# Patient Record
Sex: FEMALE | Race: WHITE | NOT HISPANIC OR LATINO | Employment: OTHER | URBAN - METROPOLITAN AREA
[De-identification: names, ages, dates, MRNs, and addresses within clinical notes are randomized per-mention and may not be internally consistent; named-entity substitution may affect disease eponyms.]

---

## 2017-12-06 ENCOUNTER — CONVERSION ENCOUNTER (OUTPATIENT)
Dept: MAMMOGRAPHY | Facility: HOSPITAL | Age: 62
End: 2017-12-06

## 2018-02-09 ENCOUNTER — OFFICE VISIT CONVERTED (OUTPATIENT)
Dept: FAMILY MEDICINE CLINIC | Facility: CLINIC | Age: 63
End: 2018-02-09
Attending: NURSE PRACTITIONER

## 2018-04-12 ENCOUNTER — OFFICE VISIT CONVERTED (OUTPATIENT)
Dept: FAMILY MEDICINE CLINIC | Facility: CLINIC | Age: 63
End: 2018-04-12
Attending: NURSE PRACTITIONER

## 2018-05-10 ENCOUNTER — OFFICE VISIT CONVERTED (OUTPATIENT)
Dept: FAMILY MEDICINE CLINIC | Facility: CLINIC | Age: 63
End: 2018-05-10
Attending: NURSE PRACTITIONER

## 2018-05-10 ENCOUNTER — CONVERSION ENCOUNTER (OUTPATIENT)
Dept: FAMILY MEDICINE CLINIC | Facility: CLINIC | Age: 63
End: 2018-05-10

## 2018-07-20 ENCOUNTER — CONVERSION ENCOUNTER (OUTPATIENT)
Dept: CARDIOLOGY | Facility: CLINIC | Age: 63
End: 2018-07-20

## 2018-07-20 ENCOUNTER — OFFICE VISIT CONVERTED (OUTPATIENT)
Dept: CARDIOLOGY | Facility: CLINIC | Age: 63
End: 2018-07-20
Attending: INTERNAL MEDICINE

## 2018-11-06 ENCOUNTER — OFFICE VISIT CONVERTED (OUTPATIENT)
Dept: FAMILY MEDICINE CLINIC | Facility: CLINIC | Age: 63
End: 2018-11-06
Attending: NURSE PRACTITIONER

## 2018-12-08 ENCOUNTER — CONVERSION ENCOUNTER (OUTPATIENT)
Dept: MAMMOGRAPHY | Facility: HOSPITAL | Age: 63
End: 2018-12-08

## 2019-01-18 ENCOUNTER — HOSPITAL ENCOUNTER (OUTPATIENT)
Dept: OTHER | Facility: HOSPITAL | Age: 64
Discharge: HOME | End: 2019-01-18
Attending: INTERNAL MEDICINE

## 2019-01-18 LAB
ANION GAP SERPL CALC-SCNC: 18 MMOL/L (ref 8–19)
BUN SERPL-MCNC: 11 MG/DL (ref 5–25)
BUN/CREAT SERPL: 19 {RATIO} (ref 6–20)
CALCIUM SERPL-MCNC: 9.3 MG/DL (ref 8.7–10.4)
CHLORIDE SERPL-SCNC: 100 MMOL/L (ref 99–111)
CONV CO2: 25 MMOL/L (ref 22–32)
CREAT UR-MCNC: 0.58 MG/DL (ref 0.5–0.9)
GFR SERPLBLD BASED ON 1.73 SQ M-ARVRAT: >60 ML/MIN/{1.73_M2}
GLUCOSE SERPL-MCNC: 111 MG/DL (ref 65–99)
MAGNESIUM SERPL-MCNC: 2.13 MG/DL (ref 1.6–2.3)
OSMOLALITY SERPL CALC.SUM OF ELEC: 286 MOSM/KG (ref 273–304)
POTASSIUM SERPL-SCNC: 4.5 MMOL/L (ref 3.5–5.3)
SODIUM SERPL-SCNC: 138 MMOL/L (ref 135–147)

## 2019-01-25 ENCOUNTER — OFFICE VISIT CONVERTED (OUTPATIENT)
Dept: CARDIOLOGY | Facility: CLINIC | Age: 64
End: 2019-01-25
Attending: INTERNAL MEDICINE

## 2019-02-18 ENCOUNTER — OFFICE VISIT CONVERTED (OUTPATIENT)
Dept: FAMILY MEDICINE CLINIC | Facility: CLINIC | Age: 64
End: 2019-02-18
Attending: NURSE PRACTITIONER

## 2019-03-02 ENCOUNTER — HOSPITAL ENCOUNTER (OUTPATIENT)
Dept: URGENT CARE | Facility: CLINIC | Age: 64
Discharge: HOME | End: 2019-03-02
Attending: NURSE PRACTITIONER

## 2019-03-08 ENCOUNTER — OFFICE VISIT CONVERTED (OUTPATIENT)
Dept: FAMILY MEDICINE CLINIC | Facility: CLINIC | Age: 64
End: 2019-03-08
Attending: NURSE PRACTITIONER

## 2019-04-26 ENCOUNTER — HOSPITAL ENCOUNTER (OUTPATIENT)
Dept: FAMILY MEDICINE CLINIC | Facility: CLINIC | Age: 64
Discharge: HOME | End: 2019-04-26
Attending: NURSE PRACTITIONER

## 2019-04-26 LAB
ALBUMIN SERPL-MCNC: 4.4 G/DL (ref 3.5–5)
ALBUMIN/GLOB SERPL: 1.4 {RATIO} (ref 1.4–2.6)
ALP SERPL-CCNC: 103 U/L (ref 43–160)
ALT SERPL-CCNC: 22 U/L (ref 10–40)
ANION GAP SERPL CALC-SCNC: 17 MMOL/L (ref 8–19)
AST SERPL-CCNC: 19 U/L (ref 15–50)
BASOPHILS # BLD AUTO: 0.01 10*3/UL (ref 0–0.2)
BASOPHILS NFR BLD AUTO: 0.2 % (ref 0–3)
BILIRUB SERPL-MCNC: 0.49 MG/DL (ref 0.2–1.3)
BUN SERPL-MCNC: 12 MG/DL (ref 5–25)
BUN/CREAT SERPL: 17 {RATIO} (ref 6–20)
CALCIUM SERPL-MCNC: 9.3 MG/DL (ref 8.7–10.4)
CHLORIDE SERPL-SCNC: 101 MMOL/L (ref 99–111)
CHOLEST SERPL-MCNC: 185 MG/DL (ref 107–200)
CHOLEST/HDLC SERPL: 3.9 {RATIO} (ref 3–6)
CONV ABS IMM GRAN: 0.01 10*3/UL (ref 0–0.2)
CONV CO2: 25 MMOL/L (ref 22–32)
CONV IMMATURE GRAN: 0.2 % (ref 0–1.8)
CONV TOTAL PROTEIN: 7.5 G/DL (ref 6.3–8.2)
CREAT UR-MCNC: 0.69 MG/DL (ref 0.5–0.9)
DEPRECATED RDW RBC AUTO: 43.8 FL (ref 36.4–46.3)
EOSINOPHIL # BLD AUTO: 0.11 10*3/UL (ref 0–0.7)
EOSINOPHIL # BLD AUTO: 1.9 % (ref 0–7)
ERYTHROCYTE [DISTWIDTH] IN BLOOD BY AUTOMATED COUNT: 12.7 % (ref 11.7–14.4)
EST. AVERAGE GLUCOSE BLD GHB EST-MCNC: 137 MG/DL
GFR SERPLBLD BASED ON 1.73 SQ M-ARVRAT: >60 ML/MIN/{1.73_M2}
GLOBULIN UR ELPH-MCNC: 3.1 G/DL (ref 2–3.5)
GLUCOSE SERPL-MCNC: 127 MG/DL (ref 65–99)
HBA1C MFR BLD: 14.6 G/DL (ref 12–16)
HBA1C MFR BLD: 6.4 % (ref 3.5–5.7)
HCT VFR BLD AUTO: 45.7 % (ref 37–47)
HDLC SERPL-MCNC: 48 MG/DL (ref 40–60)
LDLC SERPL CALC-MCNC: 113 MG/DL (ref 70–100)
LYMPHOCYTES # BLD AUTO: 1.58 10*3/UL (ref 1–5)
MCH RBC QN AUTO: 29.7 PG (ref 27–31)
MCHC RBC AUTO-ENTMCNC: 31.9 G/DL (ref 33–37)
MCV RBC AUTO: 93.1 FL (ref 81–99)
MONOCYTES # BLD AUTO: 0.49 10*3/UL (ref 0.2–1.2)
MONOCYTES NFR BLD AUTO: 8.4 % (ref 3–10)
NEUTROPHILS # BLD AUTO: 3.62 10*3/UL (ref 2–8)
NEUTROPHILS NFR BLD AUTO: 62.2 % (ref 30–85)
NRBC CBCN: 0 % (ref 0–0.7)
OSMOLALITY SERPL CALC.SUM OF ELEC: 287 MOSM/KG (ref 273–304)
PLATELET # BLD AUTO: 211 10*3/UL (ref 130–400)
PMV BLD AUTO: 11.5 FL (ref 9.4–12.3)
POTASSIUM SERPL-SCNC: 4.9 MMOL/L (ref 3.5–5.3)
RBC # BLD AUTO: 4.91 10*6/UL (ref 4.2–5.4)
SODIUM SERPL-SCNC: 138 MMOL/L (ref 135–147)
T4 FREE SERPL-MCNC: 1.3 NG/DL (ref 0.9–1.8)
TRIGL SERPL-MCNC: 121 MG/DL (ref 40–150)
TSH SERPL-ACNC: 0.79 M[IU]/L (ref 0.27–4.2)
VARIANT LYMPHS NFR BLD MANUAL: 27.1 % (ref 20–45)
VLDLC SERPL-MCNC: 24 MG/DL (ref 5–37)
WBC # BLD AUTO: 5.82 10*3/UL (ref 4.8–10.8)

## 2019-04-29 ENCOUNTER — OFFICE VISIT CONVERTED (OUTPATIENT)
Dept: FAMILY MEDICINE CLINIC | Facility: CLINIC | Age: 64
End: 2019-04-29
Attending: NURSE PRACTITIONER

## 2019-05-23 ENCOUNTER — HOSPITAL ENCOUNTER (OUTPATIENT)
Dept: GENERAL RADIOLOGY | Facility: HOSPITAL | Age: 64
Discharge: HOME | End: 2019-05-23
Attending: NURSE PRACTITIONER

## 2019-07-25 ENCOUNTER — CONVERSION ENCOUNTER (OUTPATIENT)
Dept: CARDIOLOGY | Facility: CLINIC | Age: 64
End: 2019-07-25

## 2019-07-25 ENCOUNTER — OFFICE VISIT CONVERTED (OUTPATIENT)
Dept: CARDIOLOGY | Facility: CLINIC | Age: 64
End: 2019-07-25
Attending: INTERNAL MEDICINE

## 2019-08-15 ENCOUNTER — OFFICE VISIT CONVERTED (OUTPATIENT)
Dept: FAMILY MEDICINE CLINIC | Facility: CLINIC | Age: 64
End: 2019-08-15
Attending: NURSE PRACTITIONER

## 2019-08-15 ENCOUNTER — HOSPITAL ENCOUNTER (OUTPATIENT)
Dept: FAMILY MEDICINE CLINIC | Facility: CLINIC | Age: 64
Discharge: HOME | End: 2019-08-15
Attending: NURSE PRACTITIONER

## 2019-08-15 ENCOUNTER — CONVERSION ENCOUNTER (OUTPATIENT)
Dept: FAMILY MEDICINE CLINIC | Facility: CLINIC | Age: 64
End: 2019-08-15

## 2019-08-17 LAB — BACTERIA SPEC AEROBE CULT: NORMAL

## 2019-09-16 ENCOUNTER — CONVERSION ENCOUNTER (OUTPATIENT)
Dept: FAMILY MEDICINE CLINIC | Facility: CLINIC | Age: 64
End: 2019-09-16

## 2019-09-16 ENCOUNTER — OFFICE VISIT CONVERTED (OUTPATIENT)
Dept: FAMILY MEDICINE CLINIC | Facility: CLINIC | Age: 64
End: 2019-09-16
Attending: NURSE PRACTITIONER

## 2019-10-10 ENCOUNTER — HOSPITAL ENCOUNTER (OUTPATIENT)
Dept: FAMILY MEDICINE CLINIC | Facility: CLINIC | Age: 64
Discharge: HOME | End: 2019-10-10
Attending: NURSE PRACTITIONER

## 2019-10-10 LAB
ALBUMIN SERPL-MCNC: 4.3 G/DL (ref 3.5–5)
ALBUMIN/GLOB SERPL: 1.5 {RATIO} (ref 1.4–2.6)
ALP SERPL-CCNC: 99 U/L (ref 43–160)
ALT SERPL-CCNC: 32 U/L (ref 10–40)
ANION GAP SERPL CALC-SCNC: 17 MMOL/L (ref 8–19)
AST SERPL-CCNC: 31 U/L (ref 15–50)
BASOPHILS # BLD AUTO: 0.01 10*3/UL (ref 0–0.2)
BASOPHILS NFR BLD AUTO: 0.1 % (ref 0–3)
BILIRUB SERPL-MCNC: 0.66 MG/DL (ref 0.2–1.3)
BUN SERPL-MCNC: 8 MG/DL (ref 5–25)
BUN/CREAT SERPL: 13 {RATIO} (ref 6–20)
CALCIUM SERPL-MCNC: 9.5 MG/DL (ref 8.7–10.4)
CHLORIDE SERPL-SCNC: 99 MMOL/L (ref 99–111)
CHOLEST SERPL-MCNC: 181 MG/DL (ref 107–200)
CHOLEST/HDLC SERPL: 4.6 {RATIO} (ref 3–6)
CONV ABS IMM GRAN: 0.02 10*3/UL (ref 0–0.2)
CONV CO2: 22 MMOL/L (ref 22–32)
CONV IMMATURE GRAN: 0.3 % (ref 0–1.8)
CONV TOTAL PROTEIN: 7.1 G/DL (ref 6.3–8.2)
CREAT UR-MCNC: 0.61 MG/DL (ref 0.5–0.9)
DEPRECATED RDW RBC AUTO: 44.7 FL (ref 36.4–46.3)
EOSINOPHIL # BLD AUTO: 0.12 10*3/UL (ref 0–0.7)
EOSINOPHIL # BLD AUTO: 1.8 % (ref 0–7)
ERYTHROCYTE [DISTWIDTH] IN BLOOD BY AUTOMATED COUNT: 13.2 % (ref 11.7–14.4)
EST. AVERAGE GLUCOSE BLD GHB EST-MCNC: 157 MG/DL
GFR SERPLBLD BASED ON 1.73 SQ M-ARVRAT: >60 ML/MIN/{1.73_M2}
GLOBULIN UR ELPH-MCNC: 2.8 G/DL (ref 2–3.5)
GLUCOSE SERPL-MCNC: 132 MG/DL (ref 65–99)
HBA1C MFR BLD: 7.1 % (ref 3.5–5.7)
HCT VFR BLD AUTO: 45.1 % (ref 37–47)
HDLC SERPL-MCNC: 39 MG/DL (ref 40–60)
HGB BLD-MCNC: 14.6 G/DL (ref 12–16)
LDLC SERPL CALC-MCNC: 104 MG/DL (ref 70–100)
LYMPHOCYTES # BLD AUTO: 1.42 10*3/UL (ref 1–5)
LYMPHOCYTES NFR BLD AUTO: 21.3 % (ref 20–45)
MCH RBC QN AUTO: 29.8 PG (ref 27–31)
MCHC RBC AUTO-ENTMCNC: 32.4 G/DL (ref 33–37)
MCV RBC AUTO: 92 FL (ref 81–99)
MONOCYTES # BLD AUTO: 0.47 10*3/UL (ref 0.2–1.2)
MONOCYTES NFR BLD AUTO: 7 % (ref 3–10)
NEUTROPHILS # BLD AUTO: 4.64 10*3/UL (ref 2–8)
NEUTROPHILS NFR BLD AUTO: 69.5 % (ref 30–85)
NRBC CBCN: 0 % (ref 0–0.7)
OSMOLALITY SERPL CALC.SUM OF ELEC: 278 MOSM/KG (ref 273–304)
PLATELET # BLD AUTO: 204 10*3/UL (ref 130–400)
PMV BLD AUTO: 11.9 FL (ref 9.4–12.3)
POTASSIUM SERPL-SCNC: 4.3 MMOL/L (ref 3.5–5.3)
RBC # BLD AUTO: 4.9 10*6/UL (ref 4.2–5.4)
SODIUM SERPL-SCNC: 134 MMOL/L (ref 135–147)
T4 FREE SERPL-MCNC: 1.1 NG/DL (ref 0.9–1.8)
TRIGL SERPL-MCNC: 189 MG/DL (ref 40–150)
TSH SERPL-ACNC: 1.04 M[IU]/L (ref 0.27–4.2)
VLDLC SERPL-MCNC: 38 MG/DL (ref 5–37)
WBC # BLD AUTO: 6.68 10*3/UL (ref 4.8–10.8)

## 2019-10-16 ENCOUNTER — CONVERSION ENCOUNTER (OUTPATIENT)
Dept: FAMILY MEDICINE CLINIC | Facility: CLINIC | Age: 64
End: 2019-10-16

## 2019-10-16 ENCOUNTER — OFFICE VISIT CONVERTED (OUTPATIENT)
Dept: FAMILY MEDICINE CLINIC | Facility: CLINIC | Age: 64
End: 2019-10-16
Attending: NURSE PRACTITIONER

## 2019-12-11 ENCOUNTER — HOSPITAL ENCOUNTER (OUTPATIENT)
Dept: MAMMOGRAPHY | Facility: HOSPITAL | Age: 64
Discharge: HOME | End: 2019-12-11
Attending: NURSE PRACTITIONER

## 2020-01-03 ENCOUNTER — HOSPITAL ENCOUNTER (OUTPATIENT)
Dept: MAMMOGRAPHY | Facility: HOSPITAL | Age: 65
Discharge: HOME | End: 2020-01-03
Attending: NURSE PRACTITIONER

## 2020-04-09 ENCOUNTER — HOSPITAL ENCOUNTER (OUTPATIENT)
Dept: FAMILY MEDICINE CLINIC | Facility: CLINIC | Age: 65
Discharge: HOME | End: 2020-04-09
Attending: NURSE PRACTITIONER

## 2020-04-09 LAB
25(OH)D3 SERPL-MCNC: 18 NG/ML (ref 30–100)
ALBUMIN SERPL-MCNC: 4.4 G/DL (ref 3.5–5)
ALBUMIN/GLOB SERPL: 1.6 {RATIO} (ref 1.4–2.6)
ALP SERPL-CCNC: 92 U/L (ref 43–160)
ALT SERPL-CCNC: 19 U/L (ref 10–40)
ANION GAP SERPL CALC-SCNC: 19 MMOL/L (ref 8–19)
AST SERPL-CCNC: 18 U/L (ref 15–50)
BASOPHILS # BLD AUTO: 0.01 10*3/UL (ref 0–0.2)
BASOPHILS NFR BLD AUTO: 0.2 % (ref 0–3)
BILIRUB SERPL-MCNC: 0.47 MG/DL (ref 0.2–1.3)
BUN SERPL-MCNC: 12 MG/DL (ref 5–25)
BUN/CREAT SERPL: 19 {RATIO} (ref 6–20)
CALCIUM SERPL-MCNC: 9.5 MG/DL (ref 8.7–10.4)
CHLORIDE SERPL-SCNC: 103 MMOL/L (ref 99–111)
CHOLEST SERPL-MCNC: 178 MG/DL (ref 107–200)
CHOLEST/HDLC SERPL: 4.3 {RATIO} (ref 3–6)
CONV ABS IMM GRAN: 0.01 10*3/UL (ref 0–0.2)
CONV CO2: 22 MMOL/L (ref 22–32)
CONV IMMATURE GRAN: 0.2 % (ref 0–1.8)
CONV TOTAL PROTEIN: 7.2 G/DL (ref 6.3–8.2)
CREAT UR-MCNC: 0.64 MG/DL (ref 0.5–0.9)
DEPRECATED RDW RBC AUTO: 43.1 FL (ref 36.4–46.3)
EOSINOPHIL # BLD AUTO: 0.12 10*3/UL (ref 0–0.7)
EOSINOPHIL # BLD AUTO: 1.8 % (ref 0–7)
ERYTHROCYTE [DISTWIDTH] IN BLOOD BY AUTOMATED COUNT: 12.9 % (ref 11.7–14.4)
EST. AVERAGE GLUCOSE BLD GHB EST-MCNC: 140 MG/DL
GFR SERPLBLD BASED ON 1.73 SQ M-ARVRAT: >60 ML/MIN/{1.73_M2}
GLOBULIN UR ELPH-MCNC: 2.8 G/DL (ref 2–3.5)
GLUCOSE SERPL-MCNC: 131 MG/DL (ref 65–99)
HBA1C MFR BLD: 6.5 % (ref 3.5–5.7)
HCT VFR BLD AUTO: 45.9 % (ref 37–47)
HDLC SERPL-MCNC: 41 MG/DL (ref 40–60)
HGB BLD-MCNC: 15.2 G/DL (ref 12–16)
LDLC SERPL CALC-MCNC: 100 MG/DL (ref 70–100)
LYMPHOCYTES # BLD AUTO: 1.68 10*3/UL (ref 1–5)
LYMPHOCYTES NFR BLD AUTO: 25.3 % (ref 20–45)
MCH RBC QN AUTO: 30.5 PG (ref 27–31)
MCHC RBC AUTO-ENTMCNC: 33.1 G/DL (ref 33–37)
MCV RBC AUTO: 92 FL (ref 81–99)
MONOCYTES # BLD AUTO: 0.51 10*3/UL (ref 0.2–1.2)
MONOCYTES NFR BLD AUTO: 7.7 % (ref 3–10)
NEUTROPHILS # BLD AUTO: 4.31 10*3/UL (ref 2–8)
NEUTROPHILS NFR BLD AUTO: 64.8 % (ref 30–85)
NRBC CBCN: 0 % (ref 0–0.7)
OSMOLALITY SERPL CALC.SUM OF ELEC: 290 MOSM/KG (ref 273–304)
PLATELET # BLD AUTO: 210 10*3/UL (ref 130–400)
PMV BLD AUTO: 11.7 FL (ref 9.4–12.3)
POTASSIUM SERPL-SCNC: 4.7 MMOL/L (ref 3.5–5.3)
RBC # BLD AUTO: 4.99 10*6/UL (ref 4.2–5.4)
SODIUM SERPL-SCNC: 139 MMOL/L (ref 135–147)
T4 FREE SERPL-MCNC: 1.1 NG/DL (ref 0.9–1.8)
TRIGL SERPL-MCNC: 187 MG/DL (ref 40–150)
TSH SERPL-ACNC: 2.43 M[IU]/L (ref 0.27–4.2)
VLDLC SERPL-MCNC: 37 MG/DL (ref 5–37)
WBC # BLD AUTO: 6.64 10*3/UL (ref 4.8–10.8)

## 2020-04-13 ENCOUNTER — TELEPHONE CONVERTED (OUTPATIENT)
Dept: FAMILY MEDICINE CLINIC | Facility: CLINIC | Age: 65
End: 2020-04-13
Attending: NURSE PRACTITIONER

## 2020-07-30 ENCOUNTER — OFFICE VISIT CONVERTED (OUTPATIENT)
Dept: CARDIOLOGY | Facility: CLINIC | Age: 65
End: 2020-07-30
Attending: INTERNAL MEDICINE

## 2020-10-06 ENCOUNTER — HOSPITAL ENCOUNTER (OUTPATIENT)
Dept: FAMILY MEDICINE CLINIC | Facility: CLINIC | Age: 65
Discharge: HOME | End: 2020-10-06
Attending: NURSE PRACTITIONER

## 2020-10-06 LAB
25(OH)D3 SERPL-MCNC: 20.7 NG/ML (ref 30–100)
ALBUMIN SERPL-MCNC: 4.4 G/DL (ref 3.5–5)
ALBUMIN/GLOB SERPL: 1.6 {RATIO} (ref 1.4–2.6)
ALP SERPL-CCNC: 90 U/L (ref 43–160)
ALT SERPL-CCNC: 27 U/L (ref 10–40)
ANION GAP SERPL CALC-SCNC: 16 MMOL/L (ref 8–19)
AST SERPL-CCNC: 23 U/L (ref 15–50)
BASOPHILS # BLD AUTO: 0.01 10*3/UL (ref 0–0.2)
BASOPHILS NFR BLD AUTO: 0.1 % (ref 0–3)
BILIRUB SERPL-MCNC: 0.37 MG/DL (ref 0.2–1.3)
BUN SERPL-MCNC: 12 MG/DL (ref 5–25)
BUN/CREAT SERPL: 16 {RATIO} (ref 6–20)
CALCIUM SERPL-MCNC: 9.2 MG/DL (ref 8.7–10.4)
CHLORIDE SERPL-SCNC: 104 MMOL/L (ref 99–111)
CHOLEST SERPL-MCNC: 167 MG/DL (ref 107–200)
CHOLEST/HDLC SERPL: 3.6 {RATIO} (ref 3–6)
CONV ABS IMM GRAN: 0.01 10*3/UL (ref 0–0.2)
CONV CO2: 26 MMOL/L (ref 22–32)
CONV IMMATURE GRAN: 0.1 % (ref 0–1.8)
CONV TOTAL PROTEIN: 7.2 G/DL (ref 6.3–8.2)
CREAT UR-MCNC: 0.76 MG/DL (ref 0.5–0.9)
DEPRECATED RDW RBC AUTO: 44.8 FL (ref 36.4–46.3)
EOSINOPHIL # BLD AUTO: 0.1 10*3/UL (ref 0–0.7)
EOSINOPHIL # BLD AUTO: 1.5 % (ref 0–7)
ERYTHROCYTE [DISTWIDTH] IN BLOOD BY AUTOMATED COUNT: 13 % (ref 11.7–14.4)
EST. AVERAGE GLUCOSE BLD GHB EST-MCNC: 169 MG/DL
GFR SERPLBLD BASED ON 1.73 SQ M-ARVRAT: >60 ML/MIN/{1.73_M2}
GLOBULIN UR ELPH-MCNC: 2.8 G/DL (ref 2–3.5)
GLUCOSE SERPL-MCNC: 133 MG/DL (ref 65–99)
HBA1C MFR BLD: 7.5 % (ref 3.5–5.7)
HCT VFR BLD AUTO: 48 % (ref 37–47)
HDLC SERPL-MCNC: 47 MG/DL (ref 40–60)
HGB BLD-MCNC: 15.1 G/DL (ref 12–16)
LDLC SERPL CALC-MCNC: 93 MG/DL (ref 70–100)
LYMPHOCYTES # BLD AUTO: 1.69 10*3/UL (ref 1–5)
LYMPHOCYTES NFR BLD AUTO: 25.3 % (ref 20–45)
MCH RBC QN AUTO: 29.5 PG (ref 27–31)
MCHC RBC AUTO-ENTMCNC: 31.5 G/DL (ref 33–37)
MCV RBC AUTO: 93.8 FL (ref 81–99)
MONOCYTES # BLD AUTO: 0.52 10*3/UL (ref 0.2–1.2)
MONOCYTES NFR BLD AUTO: 7.8 % (ref 3–10)
NEUTROPHILS # BLD AUTO: 4.34 10*3/UL (ref 2–8)
NEUTROPHILS NFR BLD AUTO: 65.2 % (ref 30–85)
NRBC CBCN: 0 % (ref 0–0.7)
OSMOLALITY SERPL CALC.SUM OF ELEC: 294 MOSM/KG (ref 273–304)
PLATELET # BLD AUTO: 206 10*3/UL (ref 130–400)
PMV BLD AUTO: 12.1 FL (ref 9.4–12.3)
POTASSIUM SERPL-SCNC: 4.6 MMOL/L (ref 3.5–5.3)
RBC # BLD AUTO: 5.12 10*6/UL (ref 4.2–5.4)
SODIUM SERPL-SCNC: 141 MMOL/L (ref 135–147)
T4 FREE SERPL-MCNC: 1.2 NG/DL (ref 0.9–1.8)
TRIGL SERPL-MCNC: 137 MG/DL (ref 40–150)
TSH SERPL-ACNC: 2.3 M[IU]/L (ref 0.27–4.2)
VLDLC SERPL-MCNC: 27 MG/DL (ref 5–37)
WBC # BLD AUTO: 6.67 10*3/UL (ref 4.8–10.8)

## 2020-10-08 ENCOUNTER — OFFICE VISIT CONVERTED (OUTPATIENT)
Dept: FAMILY MEDICINE CLINIC | Facility: CLINIC | Age: 65
End: 2020-10-08
Attending: NURSE PRACTITIONER

## 2020-12-17 ENCOUNTER — TELEPHONE CONVERTED (OUTPATIENT)
Dept: FAMILY MEDICINE CLINIC | Facility: CLINIC | Age: 65
End: 2020-12-17
Attending: NURSE PRACTITIONER

## 2020-12-17 ENCOUNTER — HOSPITAL ENCOUNTER (OUTPATIENT)
Dept: FAMILY MEDICINE CLINIC | Facility: CLINIC | Age: 65
Discharge: HOME | End: 2020-12-17
Attending: NURSE PRACTITIONER

## 2020-12-19 LAB — SARS-COV-2 RNA SPEC QL NAA+PROBE: DETECTED

## 2021-01-20 ENCOUNTER — HOSPITAL ENCOUNTER (OUTPATIENT)
Dept: MAMMOGRAPHY | Facility: HOSPITAL | Age: 66
Discharge: HOME | End: 2021-01-20
Attending: NURSE PRACTITIONER

## 2021-03-17 ENCOUNTER — OFFICE VISIT CONVERTED (OUTPATIENT)
Dept: FAMILY MEDICINE CLINIC | Facility: CLINIC | Age: 66
End: 2021-03-17
Attending: NURSE PRACTITIONER

## 2021-03-17 ENCOUNTER — CONVERSION ENCOUNTER (OUTPATIENT)
Dept: FAMILY MEDICINE CLINIC | Facility: CLINIC | Age: 66
End: 2021-03-17

## 2021-03-17 ENCOUNTER — HOSPITAL ENCOUNTER (OUTPATIENT)
Dept: FAMILY MEDICINE CLINIC | Facility: CLINIC | Age: 66
Discharge: HOME | End: 2021-03-17
Attending: NURSE PRACTITIONER

## 2021-03-17 LAB
B-HEM STREP SPEC QL CULT: NEGATIVE
FLUAV RNA SPEC QL NAA+PROBE: NEGATIVE
FLUBV RNA SPEC QL NAA+PROBE: NEGATIVE

## 2021-03-18 LAB — SARS-COV-2 RNA SPEC QL NAA+PROBE: NOT DETECTED

## 2021-03-19 LAB — BACTERIA SPEC AEROBE CULT: NORMAL

## 2021-03-26 ENCOUNTER — HOSPITAL ENCOUNTER (OUTPATIENT)
Dept: GENERAL RADIOLOGY | Facility: HOSPITAL | Age: 66
Discharge: HOME | End: 2021-03-26
Attending: NURSE PRACTITIONER

## 2021-03-26 LAB
ALBUMIN SERPL-MCNC: 4 G/DL (ref 3.5–5)
ALBUMIN/GLOB SERPL: 1.3 {RATIO} (ref 1.4–2.6)
ALP SERPL-CCNC: 74 U/L (ref 43–160)
ALT SERPL-CCNC: 31 U/L (ref 10–40)
ANION GAP SERPL CALC-SCNC: 16 MMOL/L (ref 8–19)
AST SERPL-CCNC: 23 U/L (ref 15–50)
BASOPHILS # BLD AUTO: 0.02 10*3/UL (ref 0–0.2)
BASOPHILS NFR BLD AUTO: 0.2 % (ref 0–3)
BILIRUB SERPL-MCNC: 1.03 MG/DL (ref 0.2–1.3)
BUN SERPL-MCNC: 17 MG/DL (ref 5–25)
BUN/CREAT SERPL: 20 {RATIO} (ref 6–20)
CALCIUM SERPL-MCNC: 9.3 MG/DL (ref 8.7–10.4)
CHLORIDE SERPL-SCNC: 101 MMOL/L (ref 99–111)
CHOLEST SERPL-MCNC: 164 MG/DL (ref 107–200)
CHOLEST/HDLC SERPL: 3.3 {RATIO} (ref 3–6)
CONV ABS IMM GRAN: 0.05 10*3/UL (ref 0–0.2)
CONV CO2: 24 MMOL/L (ref 22–32)
CONV CREATININE URINE, RANDOM: 200.8 MG/DL (ref 10–300)
CONV IMMATURE GRAN: 0.5 % (ref 0–1.8)
CONV MICROALBUM.,U,RANDOM: 12.3 MG/L (ref 0–20)
CONV TOTAL PROTEIN: 7 G/DL (ref 6.3–8.2)
CREAT UR-MCNC: 0.87 MG/DL (ref 0.5–0.9)
DEPRECATED RDW RBC AUTO: 44.1 FL (ref 36.4–46.3)
EOSINOPHIL # BLD AUTO: 0.07 10*3/UL (ref 0–0.7)
EOSINOPHIL # BLD AUTO: 0.8 % (ref 0–7)
ERYTHROCYTE [DISTWIDTH] IN BLOOD BY AUTOMATED COUNT: 13 % (ref 11.7–14.4)
EST. AVERAGE GLUCOSE BLD GHB EST-MCNC: 154 MG/DL
GFR SERPLBLD BASED ON 1.73 SQ M-ARVRAT: >60 ML/MIN/{1.73_M2}
GLOBULIN UR ELPH-MCNC: 3 G/DL (ref 2–3.5)
GLUCOSE SERPL-MCNC: 120 MG/DL (ref 65–99)
HBA1C MFR BLD: 7 % (ref 3.5–5.7)
HCT VFR BLD AUTO: 47.8 % (ref 37–47)
HDLC SERPL-MCNC: 49 MG/DL (ref 40–60)
HGB BLD-MCNC: 15.6 G/DL (ref 12–16)
LDLC SERPL CALC-MCNC: 88 MG/DL (ref 70–100)
LYMPHOCYTES # BLD AUTO: 2.8 10*3/UL (ref 1–5)
LYMPHOCYTES NFR BLD AUTO: 30.4 % (ref 20–45)
MCH RBC QN AUTO: 30.2 PG (ref 27–31)
MCHC RBC AUTO-ENTMCNC: 32.6 G/DL (ref 33–37)
MCV RBC AUTO: 92.5 FL (ref 81–99)
MICROALBUMIN/CREAT UR: 6.1 MG/G{CRE} (ref 0–35)
MONOCYTES # BLD AUTO: 0.75 10*3/UL (ref 0.2–1.2)
MONOCYTES NFR BLD AUTO: 8.2 % (ref 3–10)
NEUTROPHILS # BLD AUTO: 5.51 10*3/UL (ref 2–8)
NEUTROPHILS NFR BLD AUTO: 59.9 % (ref 30–85)
NRBC CBCN: 0 % (ref 0–0.7)
OSMOLALITY SERPL CALC.SUM OF ELEC: 287 MOSM/KG (ref 273–304)
PLATELET # BLD AUTO: 211 10*3/UL (ref 130–400)
PMV BLD AUTO: 11.5 FL (ref 9.4–12.3)
POTASSIUM SERPL-SCNC: 4 MMOL/L (ref 3.5–5.3)
RBC # BLD AUTO: 5.17 10*6/UL (ref 4.2–5.4)
SODIUM SERPL-SCNC: 137 MMOL/L (ref 135–147)
T4 FREE SERPL-MCNC: 1.7 NG/DL (ref 0.9–1.8)
TRIGL SERPL-MCNC: 137 MG/DL (ref 40–150)
TSH SERPL-ACNC: 1.68 M[IU]/L (ref 0.27–4.2)
VLDLC SERPL-MCNC: 27 MG/DL (ref 5–37)
WBC # BLD AUTO: 9.2 10*3/UL (ref 4.8–10.8)

## 2021-03-31 ENCOUNTER — OFFICE VISIT CONVERTED (OUTPATIENT)
Dept: FAMILY MEDICINE CLINIC | Facility: CLINIC | Age: 66
End: 2021-03-31
Attending: NURSE PRACTITIONER

## 2021-03-31 ENCOUNTER — HOSPITAL ENCOUNTER (OUTPATIENT)
Dept: FAMILY MEDICINE CLINIC | Facility: CLINIC | Age: 66
Discharge: HOME | End: 2021-03-31
Attending: NURSE PRACTITIONER

## 2021-04-02 LAB
CONV HEPATITIS C AB WITH REFLEX TO CONFIRMATION: 0.1 S/CO RATIO (ref 0–0.9)
CONV HEPATITIS COMMENT: NORMAL

## 2021-05-12 NOTE — PROGRESS NOTES
Quick Note      Patient Name: Loretta Ren   Patient ID: [id number]   Sex: Female   YOB: 1955    Primary Care Provider: Natalie LANGSTON   Referring Provider: Natalie LANGSTON    Visit Date: April 13, 2020    Provider: KIAN Lyons   Location: [facility]   Location Address: [address] [place] [address] RITU Killian  [id number]   Location Phone: [phone]          History Of Present Illness  TELEHEALTH VISIT  Chief Complaint: 6 Month follow up   Loretta Ren is a 65 year old /White female who is presenting for evaluation via telehealth visit. Verbal consent obtained before beginning visit.   Provider spent 8:30-8:41 minutes with the patient during telehealth visit.   The following staff were present during this visit: Halley esteves, KCW(self), pt   Past Medical History/Overview of Patient Symptoms  Loretta Ren is a 65 year old /White female who presents for evaluation and treatment of:      I reviewed her labs from 4/2020 with pt.  All looks much better with chol and sugar.  HTN/LIPID:  She is doing well with current med.  Vit D is still low and she will do OTC med with calcium   GERD:  She is doing well with the protonix but does have some acid at night at times.  Has not tried anything. No n/v/d  DM:  Well controlled for diet.  She is doing good overall.  KARSTEN:  Trintellix is doing good for her anxiety.  NO SI/HI noted.  Allergies are doing well for right now.               Assessment  · Allergic rhinitis due to allergen     477.9/J30.9  · Anxiety disorder     300.00/F41.9  · Diabetes mellitus, type 2     250.00/E11.9  · Essential hypertension     401.9/I10  · GERD (gastroesophageal reflux disease)     530.81/K21.9  · Hyperlipidemia     272.4/E78.5  · Vitamin D deficiency     268.9/E55.9    Problems Reconciled  Plan  · Orders  o Physician Telephone Evaluation, 11-20 minutes (95686) - - 04/13/2020  o ACO-39: Current medications updated and reviewed  () - - 04/13/2020  o ACO-15: Pneumococcal Vaccine Administered or Previously Received (4040F) - - 04/13/2020  o ACO-14: Influenza immunization administered or previously received () - - 04/13/2020  o ACO-20: Screening Mammography documented and reviewed () - - 04/13/2020 01/2020  · Medications  o Protonix 40 mg oral tablet,delayed release (DR/EC)   SIG: take 1 tablet (40 mg) by oral route once daily for 90 days   DISP: (90) tablets with 1 refills  Refilled on 04/13/2020     o Trintellix 10 mg oral tablet   SIG: take 1 tablet (10 mg) by oral route once daily at the same time each day for 90 days   DISP: (90) tablets with 1 refills  Refilled on 04/13/2020     o Medications have been Reconciled  o Transition of Care or Provider Policy  · Instructions  o Advised that cheeses and other sources of dairy fats, animal fats, fast food, and the extras (candy, pastries, pies, doughnuts and cookies) all contain LDL raising nutrients. Advised to increase fruits, vegetables, whole grains, and to monitor portion sizes.   o Plan Of Care:   o Chronic conditions reviewed and taken into consideration for today's treatment plan.  o Take all medications as prescribed/directed.  o She will trial Gaviscon 2 tablespoons at times of acid burning sensation. She will keep me posted. We will f.u in 6 months for labs and appt. Call with any concerns or questions. NO SI/HI noted and is doing well.   o Electronically Identified Patient Education Materials Provided Electronically  · Disposition  o Call or Return if symptoms worsen or persist.  o Return Visit Request in/on 6 months +/- 2 days ([id number]).            Electronically Signed by: KIAN Lyons -Author on April 13, 2020 08:49:34 AM

## 2021-05-13 NOTE — PROGRESS NOTES
Progress Note      Patient Name: Loretta Ren   Patient ID: [id number]   Sex: Female   YOB: 1955    Primary Care Provider: Natalie LANGSTON   Referring Provider: Natalie LANGSTON    Visit Date: October 8, 2020    Provider: KIAN Lyons   Location: [facility]   Location Address: [address] [place], [place] [address]  [id number]   Location Phone: [phone]          Chief Complaint     6 MONTH FOLLOW UP       History Of Present Illness  Loretta Ren is a 65 year old /White female who presents for evaluation and treatment of:      Here for f.u for labs and refills  She is having sore throat and has been tested for covid.  She was neg.  She is having sinus drainage.  She went for testing about 3-4 weeks ago.  NO fever.  She has had sinus pressure.  She is checking BP and her BP is doing good.  Allergies:  She is doing good with all of her meds.    Depression: She is doing well with current med  DM:  Well controlled with Diet and did get her eyes.   LIPID:  She has been eating healthier.  HTN: She is doing good with current med.  GERD:  Well controlled with med no issues noted.  She wants a rx of prevacid OTC.  Dexa was done 12/17 and it is osteopenia.  She is not taking calcium.           Past Medical History  Disease Name Date Onset Notes   Abnormal CT of the chest 04/29/2019 --    Allergic rhinitis --  --    Allergic rhinitis due to allergen 11/06/2018 --    Anxiety 11/06/2017 improved since starting Trintellix.   Anxiety disorder 11/06/2018 --    Diabetes mellitus, type 2 11/06/2018 --    Dizziness 11/06/2017 The patient reports a transient bout of dizziness that she attributes to stress. her last bout of dizziness was in July 2017. I did review her CDs which were unrevealing. I will request her records be sent for my review.   DM2 (diabetes mellitus, type 2) --  --    Duodenal ulcer --  --    Essential hypertension 11/06/2018 --    Facial  pain 11/06/2017 The patient reports a transient bout of facial pain with radiation toward the right ear. She notes that this has resolved. I did personally review her CT face which was essentially unrevealing.   Fibrosis lung 11/06/2018 --    Generalized anxiety disorder 04/29/2019 --    GERD (gastroesophageal reflux disease) --  --    GERD (gastroesophageal reflux disease) 11/06/2018 --    Heart Murmur --  --    Hemorrhoids --  --    Hyperlipidemia --  --    Hyperlipidemia 11/06/2018 --    Mitral Valve Disorders --  --    Osteopenia 04/29/2019 --    Renal calculus or stone --  --    Sarcoidosis --  --    Screening Mammogram 12/2018 --    Sinus trouble --  --    Streptococcal Sore Throat --  --    Type 2 diabetes mellitus with hyperglycemia, without long-term current use of insulin 04/29/2019 --    Urinary Tract Infection --  --          Past Surgical History  Procedure Name Date Notes   Cholecystectomy --  --    Colonoscopy 2008 --    Cystoscopy --  --    Holmium Lasertripsy --  --    lung biopsy 1992 Lung Biopsy right lobe/non cancerous tumor         Medication List  Name Date Started Instructions   Calcium 600 600 mg calcium (1,500 mg) oral tablet 11/06/2018 take 1 tablet by oral route 2 times a day   carvedilol 3.125 mg oral tablet 08/25/2020 TAKE ONE TABLET BY MOUTH TWICE DAILY   Flonase Allergy Relief 50 mcg/actuation nasal spray,suspension 10/16/2019 spray 2 sprays (100 mcg) in each nostril by intranasal route once daily as needed   loratadine 10 mg oral tablet  take 1 tablet (10 mg) by oral route once daily   MegaRed Omega-3 Krill Oil oral  Taking QD   Protonix 40 mg oral tablet,delayed release (DR/EC) 10/08/2020 take 1 tablet (40 mg) by oral route once daily for 90 days   Trintellix 10 mg oral tablet 04/13/2020 take 1 tablet (10 mg) by oral route once daily at the same time each day for 90 days   Vitamin D3 10 mcg (400 unit) oral tablet  take 1 tablet by oral route daily         Allergy List  Allergen Name  "Date Reaction Notes   aspirin --  upset stomach --    Rocephin --  --  --        Allergies Reconciled  Family Medical History  Disease Name Relative/Age Notes   Heart Disease  --    Breast Neoplasm Mother/89   --          Social History  Finding Status Start/Stop Quantity Notes   Alcohol Never --/-- --  --     --  --/-- --  --    Exercises regularly --  --/-- --  walk    --  --/-- --  2 daughters   Other Substance Use Never --/-- --  --    Tobacco Never --/-- --  --          Immunizations  NameDate Admin Mfg Trade Name Lot Number Route Inj VIS Given VIS Publication   Htgdrlfsl24/06/2020 SKB Fluarix, quadrivalent, preservative free [id number] IM RD 10/06/2020    Comments: Patient tolerated well   Ccwihfwptejf55/31/2014 NE Not Entered  NE NE 11/10/2014    Comments: Pneumococcal conjugate   Qpybgtcd22/11/2014 NE Not Entered  NE NE 12/15/2014    Comments:          Review of Systems  · Constitutional  o Denies  o : fever, fatigue, weight loss, weight gain  · Eyes  o Denies  o : blurred vision  · HENT  o Admits  o : nasal congestion, nasal discharge, postnasal drip  o Denies  o : headaches, neck pain, sore throat, ear fullness, difficulty swallowing  · Cardiovascular  o Denies  o : lower extremity edema, claudication, chest pressure, palpitations  · Respiratory  o Denies  o : shortness of breath, wheezing, cough, hemoptysis, dyspnea on exertion  · Gastrointestinal  o Denies  o : nausea, vomiting, diarrhea, constipation, abdominal pain  · Musculoskeletal  o Admits  o : muscle pain, muscular weakness, neck pain, shoulder pain  o Denies  o : limitation of motion  · Psychiatric  o Admits  o : anxiety  o Denies  o : depression, suicidal ideation, homicidal ideation      Vitals  Date Time BP Position Site L\R Cuff Size HR RR TEMP (F) WT  HT  BMI kg/m2 BSA m2 O2 Sat FR L/min FiO2 HC       10/08/2020 08:29 /78 Sitting    79 - R 24 97.1 143lbs 7oz 5'  1\" 27.1 1.67 94 %            Physical " Examination  · Constitutional  o Appearance  o : well developed, well-nourished, no acute distress  · Ears, Nose, Mouth and Throat  o Ears  o :   § External Ears  § : external auditory canal appearance normal, no discharge present  § Otoscopic Examination  § : tympanic membrane appearance within normal limits bilaterally without perforations, mobility normal  o Nose  o :   § External Nose  § : no lesions noted  § Intranasal Exam  § : thick yellow discharged noted dionisio nares.  o Throat  o :   § Oropharynx  § : PND noted erythematous noted  · Neck  o Inspection/Palpation  o : normal appearance, no masses or tenderness, trachea midline  o Thyroid  o : gland size normal, nontender, no nodules or masses present on palpation  · Respiratory  o Respiratory Effort  o : breathing unlabored  o Inspection of Chest  o : normal appearance, normal appearance, no retractions  o Auscultation of Lungs  o : clear to auscultation bilaterally throughout inspiration and expiration  · Cardiovascular  o Heart  o :   § Auscultation of Heart  § : regular rate and rhythm, no murmurs, gallops or rubs  § Palpation of Heart  § : normal apical impulse, no cardiac thrill present  o Peripheral Vascular System  o :   § Carotid Arteries  § : normal pulses bilaterally, no bruits present  § Pedal Pulses  § : bilateral pulse strength 2+  § Extremities  § : no edema, no cyanosis, no distal hair loss, normal capillary refill  · Psychiatric  o Mood and Affect  o : mood normal, affect appropriate          Assessment  · Allergic rhinitis due to allergen     477.9/J30.9  · Generalized anxiety disorder     300.02/F41.1  · Type 2 diabetes mellitus with hyperglycemia, without long-term current use of insulin       Type 2 diabetes mellitus with hyperglycemia     250.00/E11.65  · Essential hypertension     401.9/I10  · GERD (gastroesophageal reflux disease)     530.81/K21.9  · Hyperlipidemia     272.4/E78.5  · Screening for depression     V79.0/Z13.89  · Visit for  screening mammogram     V76.12/Z12.31  · Fibrosis lung     515/J84.10  · Osteopenia     733.90/M85.80  · Sinusitis     473.9/J32.9      Plan  · Orders  o ACO-18: Negative screen for clinical depression using a standardized tool () - V79.0/Z13.89 - 10/08/2020  o CBC with Auto Diff [facility] (69941) - - 04/08/2021  o Thyroid Profile (THYII) - - 04/08/2021  o ACO-39: Current medications updated and reviewed () - - 10/08/2020  o ACO-15: Pneumococcal Vaccine Administered or Previously Received (4040F) - - 10/08/2020  o ACO-14: Influenza immunization administered or previously received () - - 10/08/2020  o ACO-20: Screening Mammography documented and reviewed (3014F) - - 10/08/2020  o ACO-19: Colorectal cancer screening results documented and reviewed (3017F) - - 10/08/2020   cologuard 2019  o ACO-13: Fall Risk Screening with no falls in past year or only one fall without injury in the past year (1101F) - - 10/08/2020  o Screening Mammogram 3D Bilateral (96364, 97166, ) - - 01/06/2021  o Diabetes 2 Panel (Urine Microalbumin, CMP, Lipid, A1c, ) [facility] (66747, 02318, 98480, 66266) - - 04/08/2021  · Medications  o Augmentin 875-125 mg oral tablet   SIG: take 1 tablet by oral route every 12 hours for 10 days   DISP: (20) Tablet with 0 refills  Prescribed on 10/08/2020     o metformin 500 mg oral tablet extended release 24 hr   SIG: take 1 tablet (500 mg) by oral route once daily with the evening meal for 90 days   DISP: (90) Tablet with 1 refills  Prescribed on 10/08/2020     o Protonix 40 mg oral tablet,delayed release (DR/EC)   SIG: take 1 tablet (40 mg) by oral route once daily for 90 days   DISP: (90) Tablet with 1 refills  Refilled on 10/08/2020     o Medications have been Reconciled  o Transition of Care or Provider Policy  · Instructions  o Continue blood sugar monitoring daily and record. Bring your log to office visits. Call the office for readings below 70 and above 250 or any complications.  o Maintain a  healthy weight. Avoid tight fitting clothes. Avoid fried, fatty foods, tomato sauce, chocolate, mint, garlic, onion, alcohol. caffeine. Eat smaller meals, dont lie down after a meal, dont smoke. Elevate the head of your bed 6-9 inches.  o Advised that cheeses and other sources of dairy fats, animal fats, fast food, and the extras (candy, pasteries, pies, doughnuts and cookies) all contain LDL raising nutrients. Advised to increase fruits, vegetables, whole grains, and to monitor portion sizes.   o Depression Screen completed and scanned into the EMR under the designated folder within the patient's documents.  o PHQ-9 results 3  o Take all medications as prescribed/directed.  o Rest. Increase Fluids.  o Patient was educated/instructed on their diagnosis, treatment and medications prior to discharge from the clinic today.  o Patient instructed to seek medical attention urgently for new or worsening symptoms.  o Call the office with any concerns or questions.  o F>U in 6 months for labs and appt. Call with any concerns or questions. She will let me know when she needs rx for Trintellix due to getting in the mail. We will start BS med and keep me posted.   o Electronically Identified Patient Education Materials Provided Electronically            Electronically Signed by: KIAN Lyons -Author on October 8, 2020 09:01:33 AM

## 2021-05-13 NOTE — PROGRESS NOTES
"   Progress Note      Patient Name: Loretta Ren   Patient ID: [id number]   Sex: Female   YOB: 1955    Primary Care Provider: Natalie LANGSTON   Referring Provider: Natalie LANGSTON    Visit Date: July 30, 2020    Provider: Jay Saucedo MD   Location: [facility]   Location Address: [address] [place] [address], [facility]   [place]  [address]   Location Phone: [phone]          Chief Complaint  · Diabetes   · Hyperlipidemia       History Of Present Illness  REFERRING CARE PROVIDER: Natalie LANGSTON   Loretta Ren is a 65 year old /White female with diabetes, hypertension, dyslipidemia and PVCs. She has not been having any chest pain problems. She does have a chronic cough.   PAST MEDICAL HISTORY: Hypertension, diabetes, dyslipidemia, PVCs.   FAMILY HISTORY: Positive for diabetes and heart disease. Negative for hypertension.   PSYCHOSOCIAL HISTORY: Positive for mood changes and depression. She never used alcohol or tobacco.   CURRENT MEDICATIONS: include Fluticasone p.r.n.; Protonix 40 mg daily; Coreg 3.125 mg b.i.d.; Trintellix; Ken Red. The dosage and frequency of the medications were reviewed with the patient.       Review of Systems  · Cardiovascular  o Denies  o : palpitations (fast, fluttering, or skipping beats), swelling (feet, ankles, hands), shortness of breath while walking or lying flat, chest pain or angina pectoris   · Respiratory  o Admits  o : chronic or frequent cough  o Denies  o : asthma or wheezing      Vitals  Date Time BP Position Site L\R Cuff Size HR RR TEMP (F) WT  HT  BMI kg/m2 BSA m2 O2 Sat        07/30/2020 09:48 /72 Sitting    82 - R   141lbs 0oz 5'  1\" 26.64 1.66           Physical Examination  · Constitutional  o Appearance  o : Awake, alert, in no acute distress.   · Eyes  o Conjunctivae  o : Normal.  · Ears, Nose, Mouth and Throat  o Oral Cavity  o :   § Oral Mucosa  § : " Normal.  · Neck  o Inspection/Palpation  o : No JVD. Good carotid upstroke. No thyromegaly.  · Respiratory  o Respiratory  o : Good respiratory effort. Clear to percussion and auscultation.  · Cardiovascular  o Heart  o :   § Auscultation of Heart  § : S1, S2 normal. Regular rate and rhythm without murmurs, gallops, or rubs.  o Peripheral Vascular System  o :   § Extremities  § : Good femoral and pedal pulses. No pedal edema.  · Gastrointestinal  o Abdominal Examination  o : Soft. No tenderness or masses felt. No hepatosplenomegaly. Abdominal aorta is not palpable.          Assessment     ASSESSMENT AND PLAN:    1.  Hypertension controlled:  Continue with current medications.  2.  PVCs symptomatically stable:  She is on Coreg and tolerating it well.    MD Helena Benz/carley         This note was transcribed by Annalisa Moya.  carley/helena   The above service was transcribed by Annalisa Moya, and I attest to the accuracy of the note.  HELENA.               Electronically Signed by: Annalisa Moya-, -Author on July 31, 2020 09:27:47 AM  Electronically Co-signed by: Jay Saucedo MD -Reviewer on August 10, 2020 02:14:45 PM

## 2021-05-14 VITALS
DIASTOLIC BLOOD PRESSURE: 81 MMHG | SYSTOLIC BLOOD PRESSURE: 127 MMHG | BODY MASS INDEX: 26.53 KG/M2 | HEIGHT: 61 IN | RESPIRATION RATE: 12 BRPM | HEART RATE: 86 BPM | WEIGHT: 140.5 LBS | OXYGEN SATURATION: 96 % | TEMPERATURE: 97.1 F

## 2021-05-14 VITALS
OXYGEN SATURATION: 94 % | RESPIRATION RATE: 24 BRPM | HEIGHT: 61 IN | WEIGHT: 143.44 LBS | BODY MASS INDEX: 27.08 KG/M2 | SYSTOLIC BLOOD PRESSURE: 133 MMHG | DIASTOLIC BLOOD PRESSURE: 78 MMHG | TEMPERATURE: 97.1 F | HEART RATE: 79 BPM

## 2021-05-14 VITALS
RESPIRATION RATE: 16 BRPM | DIASTOLIC BLOOD PRESSURE: 52 MMHG | TEMPERATURE: 97.3 F | HEART RATE: 70 BPM | SYSTOLIC BLOOD PRESSURE: 100 MMHG

## 2021-05-14 NOTE — PROGRESS NOTES
Progress Note      Patient Name: Loretta Rne   Patient ID: [id number]   Sex: Female   YOB: 1955    Primary Care Provider: Natalie LANGSTON   Referring Provider: Natalie LANGSTON    Visit Date: March 31, 2021    Provider: KIAN Lyons   Location: [facility] [place]   Location Address: [address] [place], [place] [address]  [id number]   Location Phone: [phone]          Chief Complaint  · Annual Wellness Exam      History Of Present Illness  The patient is a 65 year old /White female who has come to this office for her Annual Wellness Visit.   Her Primary Care Provider is Natalie LANGSTON. Her comprehensive Care Team list, including suppliers, has been updated on the Facesheet. Her medical/family history, height, weight, BMI, and blood pressure have been reviewed and are in the chart. The Health Risk Assessment has been completed and scanned in the chart.   Medications are listed in the medication list.   The active problem list includes: Allergic rhinitis due to allergen, Essential hypertension, Fibrosis lung, Generalized anxiety disorder, GERD (gastroesophageal reflux disease), Hyperlipidemia, Osteopenia, and Type 2 diabetes mellitus with hyperglycemia, without long-term current use of insulin   The patient does not have a history of substance use.   Patient reports her diet is adequate.   The Mini-Cog has been administered and is scanned in chart. The results are negative. Her cognitive function is without limitation.   A hearing loss screen was completed today and the result is negative.   Patient does not have any risk factors for depression. Patient completed the PHQ-9 today and it has been scanned in the chart. The total score is 1-4.   The Timed Up and Go screen was administered today and the result is negative.   The Torres Index of [place] in ADLs indicated full function (score of 6).   A Falls Risk Assessment has been  completed, including a review of home fall hazards and medication review.   Overall, the patient's functional ability is noted by this provider to be within normal limits. Her level of safety is noted to be within normal limits. Her balance/gait is within normal limits. There have been no falls in the past year. Patient-specific home safety recommendations have been reviewed and a copy has been given to patient.   She denies issues with leaking urine.   There are no additional risk factors identified.   Living Will/Advanced Directive has not previously been completed.   Personalized health advice was given to the patient and a written health screening schedule was established; see Plan for details.       Past Medical History  Disease Name Date Onset Notes   Abnormal CT of the chest 04/29/2019 --    Allergic rhinitis --  --    Allergic rhinitis due to allergen 11/06/2018 --    Anxiety 11/06/2017 improved since starting Trintellix.   Anxiety disorder 11/06/2018 --    Diabetes mellitus, type 2 11/06/2018 --    Dizziness 11/06/2017 The patient reports a transient bout of dizziness that she attributes to stress. her last bout of dizziness was in July 2017. I did review her CDs which were unrevealing. I will request her records be sent for my review.   DM2 (diabetes mellitus, type 2) --  --    Duodenal ulcer --  --    Essential hypertension 11/06/2018 --    Facial pain 11/06/2017 The patient reports a transient bout of facial pain with radiation toward the right ear. She notes that this has resolved. I did personally review her CT face which was essentially unrevealing.   Fibrosis lung 11/06/2018 --    Generalized anxiety disorder 04/29/2019 --    GERD (gastroesophageal reflux disease) --  --    GERD (gastroesophageal reflux disease) 11/06/2018 --    Heart Murmur --  --    Hemorrhoids --  --    Hyperlipidemia --  --    Hyperlipidemia 11/06/2018 --    Mitral Valve Disorders --  --    Osteopenia 04/29/2019 --    Renal  calculus or stone --  --    Sarcoidosis --  --    Screening Mammogram 12/2018 --    Sinus trouble --  --    Streptococcal Sore Throat --  --    Type 2 diabetes mellitus with hyperglycemia, without long-term current use of insulin 04/29/2019 --    Urinary tract infection --  --          Past Surgical History  Procedure Name Date Notes   Cholecystectomy --  --    Colonoscopy 2008 --    Cystoscopy --  --    Holmium Lasertripsy --  --    lung biopsy 1992 Lung Biopsy right lobe/non cancerous tumor         Medication List  Name Date Started Instructions   Calcium 600 600 mg calcium (1,500 mg) oral tablet 11/06/2018 take 1 tablet by oral route 2 times a day   carvedilol 3.125 mg oral tablet 03/26/2021 TAKE ONE TABLET BY MOUTH TWICE DAILY   Flonase Allergy Relief 50 mcg/actuation nasal spray,suspension 03/17/2021 spray 2 sprays (100 mcg) in each nostril by intranasal route once daily as needed   loratadine 10 mg oral tablet  take 1 tablet (10 mg) by oral route once daily   MegaRed Omega-3 Krill Oil oral  Taking QD   Protonix 40 mg oral tablet,delayed release (DR/EC) 10/08/2020 take 1 tablet (40 mg) by oral route once daily for 90 days   Trintellix 10 mg oral tablet  take 1 tablet (10 mg) by oral route once daily at the same time each day   Vitamin D3 10 mcg (400 unit) oral tablet  take 1 tablet by oral route daily         Allergy List  Allergen Name Date Reaction Notes   aspirin --  upset stomach --    Rocephin --  --  --        Allergies Reconciled  Family Medical History  Disease Name Relative/Age Notes   Heart Disease  --    Breast Neoplasm Mother/89   --          Social History  Finding Status Start/Stop Quantity Notes   Alcohol Never --/-- --  --     --  --/-- --  --    Exercises regularly --  --/-- --  walk    --  --/-- --  2 daughters   Other Substance Use Never --/-- --  --    Tobacco Never --/-- --  --          Immunizations  NameDate Admin Mfg Trade Name Lot Number Route Inj VIS Given VIS  "Publication   Lfmahdtfq57/06/2020 SKB Fluarix, quadrivalent, preservative free [id number] IM RD 10/06/2020    Comments: Patient tolerated well   Tidbjmsmcaeq98/31/2014 NE Not Entered  NE NE 11/10/2014    Comments: Pneumococcal conjugate   Cmgcphrj57/11/2014 NE Not Entered  NE NE 12/15/2014    Comments:          Review of Systems  · Constitutional  o Denies  o : fever      Vitals  Date Time BP Position Site L\R Cuff Size HR RR TEMP (F) WT  HT  BMI kg/m2 BSA m2 O2 Sat FR L/min FiO2 HC       03/31/2021 09:51 /81 Sitting    86 - R 12 97.1 140lbs 8oz 5'  1\" 26.55 1.66 96 %            Physical Examination  · Constitutional  o Appearance  o : well-nourished, well developed, alert, in no acute distress          Assessment  · Encounter for Medicare annual wellness exam     V70.0/Z00.00  · Screening for depression     V79.0/Z13.89  · Screening for alcoholism     V79.1/Z13.39  · Advanced care planning/counseling discussion     V65.49/Z71.89  · Need for hepatitis C screening test     V73.89/Z11.59  · Allergic rhinitis due to allergen     477.9/J30.9  · Essential hypertension     401.9/I10  · Fibrosis lung     515/J84.10  · Generalized anxiety disorder     300.02/F41.1  · GERD (gastroesophageal reflux disease)     530.81/K21.9  · Hyperlipidemia     272.4/E78.5  · Osteopenia     733.90/M85.80  · Type 2 diabetes mellitus with hyperglycemia, without long-term current use of insulin       Type 2 diabetes mellitus with hyperglycemia     250.00/E11.65      Plan  · Orders  o Falls Risk Assessment Completed (3288F) - V70.0/Z00.00 - 03/31/2021  o Brief hearing screening (written) [facility] () - V70.0/Z00.00 - 03/31/2021  o Annual wellness visit; includes a personalized prevention plan of service (pps), initial visit () - V70.0/Z00.00 - 03/31/2021  o ACO-13: Fall Risk Screening with no falls in past year or only one fall without injury in the past year (1101F) - V70.0/Z00.00 - 03/31/2021  o Presence or absence of urinary " incontinence assessed (EAMON) (1090F) - V70.0/Z00.00 - 03/31/2021  o ACO-18: Negative screen for clinical depression using a standardized tool () - V79.0/Z13.89 - 03/31/2021  o Negative alcohol screening () - V79.1/Z13.39 - 03/31/2021  o Hepatitis C antibody MEDICARE screening [facility] (, [address]) - V73.89/Z11.59 - 03/31/2021  o ACO-19: Colorectal cancer screening results documented and reviewed (3017F) - - 03/31/2021  o ACO-20: Screening Mammography documented and reviewed () - - 03/31/2021  o Influenza immunization was ordered or administered () - - 03/31/2021  o ACO-15: Pneumococcal Vaccine Administered or Previously Received (4040F) - - 03/31/2021   does not want update just yet  o ACO-39: Current medications updated and reviewed (, 1159F) - - 03/31/2021  o ACO - Pt declines to or was not able to provide an Advance Care Plan or name a Surrogate Decision Maker (1124F) - - 03/31/2021  · Medications  o Medications have been Reconciled  o Transition of Care or Provider Policy  · Instructions  o Health Risk Assessment has been reviewed with the patient.  o Written health screening schedule for next 5-10 years was established with patient; information scanned in chart and given/mailed to patient.  o Fall prevention methods discussed and a copy of recommendations given/mailed to patient.  o Today's PHQ-9 score is: _1__  o Depression Screen completed and scanned into the EMR under the designated folder within the patient's documents.  o Audit-C Questionnaire completed and scanned into the EMR under the designated folder within the patient's documents.  o Audit-C score of 0-4 - Negative Screen - Brief Discussion  o Medicare suggests a once in a lifetime screening for Hepatitis C for all Medicare beneficiaries born between 2251-7199.  o She will do bone den. in Oct 2021 FU in 1 year for AWV            Electronically Signed by: KIAN Lyons -Author on March 31, 2021 11:03:32 AM

## 2021-05-14 NOTE — PROGRESS NOTES
Progress Note      Patient Name: Loretta Ren   Patient ID: [id number]   Sex: Female   YOB: 1955    Primary Care Provider: Natalie LANGSTON   Referring Provider: Natalie LANGSTON    Visit Date: March 17, 2021    Provider: KIAN Lyons   Location: [facility]   Location Address: [address] [place] [address]  [id number]   Location Phone: [phone]          Chief Complaint     Headache  Sore throat, throat red and burning  chills  Feel bad           History Of Present Illness  Past Medical History/Overview of Patient Symptoms  Loretta Ren is a 65 year old /White female who presents for evaluation and treatment of:      She has been having runny nose, cough, sneezing, burning in the chest.  She started ot go walk and she couldn't walk that much and at night it does drain noted.  She had the covid in Dec and doesn't know what is going on.  She feels rough in the AM but today it is not good overall.  She is not having sinus pressure.  She will have headache it comes and goes.  The headaches are over the eyes.  She feels that she is having a sore throat and it is on and off.  SHe is taking OTC Claritin and Benadryl isnot help much.       Past Medical History  Disease Name Date Onset Notes   Abnormal CT of the chest 04/29/2019 --    Allergic rhinitis --  --    Allergic rhinitis due to allergen 11/06/2018 --    Anxiety 11/06/2017 improved since starting Trintellix.   Anxiety disorder 11/06/2018 --    Diabetes mellitus, type 2 11/06/2018 --    Dizziness 11/06/2017 The patient reports a transient bout of dizziness that she attributes to stress. her last bout of dizziness was in July 2017. I did review her CDs which were unrevealing. I will request her records be sent for my review.   DM2 (diabetes mellitus, type 2) --  --    Duodenal ulcer --  --    Essential hypertension 11/06/2018 --    Facial pain 11/06/2017 The patient reports a  transient bout of facial pain with radiation toward the right ear. She notes that this has resolved. I did personally review her CT face which was essentially unrevealing.   Fibrosis lung 11/06/2018 --    Generalized anxiety disorder 04/29/2019 --    GERD (gastroesophageal reflux disease) --  --    GERD (gastroesophageal reflux disease) 11/06/2018 --    Heart Murmur --  --    Hemorrhoids --  --    Hyperlipidemia --  --    Hyperlipidemia 11/06/2018 --    Mitral Valve Disorders --  --    Osteopenia 04/29/2019 --    Renal calculus or stone --  --    Sarcoidosis --  --    Screening Mammogram 12/2018 --    Sinus trouble --  --    Streptococcal Sore Throat --  --    Type 2 diabetes mellitus with hyperglycemia, without long-term current use of insulin 04/29/2019 --    Urinary tract infection --  --          Past Surgical History  Procedure Name Date Notes   Cholecystectomy --  --    Colonoscopy 2008 --    Cystoscopy --  --    Holmium Lasertripsy --  --    lung biopsy 1992 Lung Biopsy right lobe/non cancerous tumor         Medication List  Name Date Started Instructions   Calcium 600 600 mg calcium (1,500 mg) oral tablet 11/06/2018 take 1 tablet by oral route 2 times a day   carvedilol 3.125 mg oral tablet 08/25/2020 TAKE ONE TABLET BY MOUTH TWICE DAILY   Flonase Allergy Relief 50 mcg/actuation nasal spray,suspension 03/17/2021 spray 2 sprays (100 mcg) in each nostril by intranasal route once daily as needed   loratadine 10 mg oral tablet  take 1 tablet (10 mg) by oral route once daily   MegaRed Omega-3 Krill Oil oral  Taking QD   Protonix 40 mg oral tablet,delayed release (DR/EC) 10/08/2020 take 1 tablet (40 mg) by oral route once daily for 90 days   Trintellix 10 mg oral tablet 04/13/2020 take 1 tablet (10 mg) by oral route once daily at the same time each day for 90 days   Vitamin D3 10 mcg (400 unit) oral tablet  take 1 tablet by oral route daily         Allergy List  Allergen Name Date Reaction Notes   aspirin --   upset stomach --    Rocephin --  --  --        Allergies Reconciled  Family Medical History  Disease Name Relative/Age Notes   Heart Disease  --    Breast Neoplasm Mother/89   --          Social History  Finding Status Start/Stop Quantity Notes   Alcohol Never --/-- --  --     --  --/-- --  --    Exercises regularly --  --/-- --  walk    --  --/-- --  2 daughters   Other Substance Use Never --/-- --  --    Tobacco Never --/-- --  --          Immunizations  NameDate Admin Mfg Trade Name Lot Number Route Inj VIS Given VIS Publication   Gcniwruoe29/06/2020 SKB Fluarix, quadrivalent, preservative free [id number] IM RD 10/06/2020    Comments: Patient tolerated well   Pygdwtaaogpa70/31/2014 NE Not Entered  NE NE 11/10/2014    Comments: Pneumococcal conjugate   Dllujzps30/11/2014 NE Not Entered  NE NE 12/15/2014    Comments:          Review of Systems  · Constitutional  o Admits  o : fatigue  o Denies  o : fever, weight loss, weight gain  · HENT  o Admits  o : nasal congestion, postnasal drip, sore throat  o Denies  o : sinus pain, ear pain  · Cardiovascular  o Denies  o : lower extremity edema, claudication, chest pressure, palpitations  · Respiratory  o Admits  o : shortness of breath, cough  o Denies  o : wheezing, hemoptysis, dyspnea on exertion  · Gastrointestinal  o Denies  o : nausea, vomiting, diarrhea, constipation, abdominal pain  · Integument  o Denies  o : rash      Vitals  Date Time BP Position Site L\R Cuff Size HR RR TEMP (F) WT  HT  BMI kg/m2 BSA m2 O2 Sat FR L/min FiO2        03/17/2021 02:32 /52 Sitting    70 - R 16 97.3                 Physical Examination  · Constitutional  o Appearance  o : well-nourished, well developed, alert, in no acute distress  · Ears, Nose, Mouth and Throat  o Ears  o :   § External Ears  § : appearance within normal limits, no lesions present  § Otoscopic Examination  § : tympanic membrane appearance within normal limits bilaterally without perforations,  mobility normal  o Nose  o :   § External Nose  § : normal stucture noted.  § Intranasal Exam  § : no swelling, reddness, turbs normal dionisio.  o Throat  o :   § Oropharynx  § : no inflammation or lesions present, tonsils within normal limits  · Respiratory  o Respiratory Effort  o : breathing unlabored  o Auscultation of Lungs  o : normal breath sounds throughout  · Cardiovascular  o Heart  o :   § Auscultation of Heart  § : regular rate and rhythm, no murmurs, gallops or rubs  § Palpation of Heart  § : normal apical impulse, no cardiac thrill present  · Musculoskeletal  o Right Upper Extremity  o :   § Inspection/Palpation  § : no tenderness to palpation  § Range of Motion  § : range of motion normal, no joint crepitus or pain with motion present  o Left Upper Extremity  o :   § Inspection/Palpation  § : no tenderness to palpation  § Range of Motion  § : range of motion normal, no joint crepitus present, no pain with joint motion  o Right Lower Extremity  o :   § Inspection/Palpation  § : no joint or limb tenderness to palpation, no edema present  § Range of Motion  § : range of motion normal, no joint crepitations present, no pain on motion  o Left Lower Extremity  o :   § Inspection/Palpation  § : no joint or limb tenderness to palpation, no edema present  § Range of Motion  § : range of motion normal, no joint crepitations present, no pain on motion  · Neurologic  o Mental Status Examination  o :   § Orientation  § : grossly oriented to person, place and time  o Cranial Nerves  o : cranial nerves intact and symmetric throughout  · Psychiatric  o Mood and Affect  o : mood normal, affect appropriate, denies any SI/HI          Results  · In-Office Procedures  o Lab procedure  § Rapid strep screen (20237)   § Beta Strep Gp A Culture: Negative   § Internal Control Verified?: Yes   § IOP - Influenza A/B Test (80310)   § Influenza A: Negative   § Influenza B: Negative   § Internal Control Verified?: Yes        Assessment  · Cough     786.2/R05  · Fatigue     780.79/R53.83  · Headache     784.0/R51      Plan  · Orders  o ACO-39: Current medications updated and reviewed (1159F, ) - - 2021  o ACO-14: Influenza immunization administered or previously received () - - 2021  o ACO-15: Pneumococcal Vaccine Administered or Previously Received [facility] (4040F) - - 2021  o Covid Testing at Non-[facility] facility (27495) - - 2021  · Medications  o prednisone 20 mg oral tablet   SI tab po TID for 3 days1 tab po BID for 3 days1 tab po Qday for 3 days   DISP: (18) Tablet with 0 refills  Prescribed on 2021     o Flonase Allergy Relief 50 mcg/actuation nasal spray,suspension   SIG: spray 2 sprays (100 mcg) in each nostril by intranasal route once daily as needed   DISP: (1) Inhalation with 5 refills  Refilled on 2021     o Medications have been Reconciled  o Transition of Care or Provider Policy  · Instructions  o Plan Of Care:   o Take all medications as prescribed/directed.  o Rest. Increase Fluids.  o Call the office with any concerns or questions.  o We will do they testing and prednisone. Drink plenty of fluids. Get OTC Mucinex plain and take it twice a day. Call with any concerns or questions.   · Disposition  o Call or Return if symptoms worsen or persist.            Electronically Signed by: KIAN Lyons -Author on 2021 03:11:26 PM

## 2021-05-14 NOTE — PROGRESS NOTES
Progress Note      Patient Name: Loretta Ren   Patient ID: [id number]   Sex: Female   YOB: 1955    Primary Care Provider: Natalie LANGSTON   Referring Provider: Natalie LANGSTON    Visit Date: March 31, 2021    Provider: KIAN Lyons   Location: [facility]   Location Address: [address] [place] [address]  [id number]   Location Phone: [phone]          Chief Complaint     6 month follow up  for her DM, htn, lipid       History Of Present Illness  Loretta Ren is a 65 year old /White female who presents for evaluation and treatment of:      Here for f.u for labs and refills  She is feeling better than she did with the med.    Allergies:  She is doing good with all of her meds.    Depression: She is doing well with current med  DM:  Well controlled with Diet and [facility] did get [facility]    LIPID:  She has been eating healthier.    HTN: She is doing good with current med.  GERD:  She is taking her meds.  n/v.  Dexa due 10/21  SHe is still seeing cardio.         Past Medical History  Disease Name Date Onset Notes   Abnormal CT of the chest 04/29/2019 --    Allergic rhinitis --  --    Allergic rhinitis due to allergen 11/06/2018 --    Anxiety 11/06/2017 improved since starting Trintellix.   Anxiety disorder 11/06/2018 --    Diabetes mellitus, type 2 11/06/2018 --    Dizziness 11/06/2017 The patient reports a transient bout of dizziness that she attributes to stress. her last bout of dizziness was in July 2017. I did review her CDs which were unrevealing. I will request her records be sent for my review.   DM2 (diabetes mellitus, type 2) --  --    Duodenal ulcer --  --    Essential hypertension 11/06/2018 --    Facial pain 11/06/2017 The patient reports a transient bout of facial pain with radiation toward the right ear. She notes that this has resolved. I did personally review her CT face which was essentially unrevealing.    Fibrosis lung 11/06/2018 --    Generalized anxiety disorder 04/29/2019 --    GERD (gastroesophageal reflux disease) --  --    GERD (gastroesophageal reflux disease) 11/06/2018 --    Heart Murmur --  --    Hemorrhoids --  --    Hyperlipidemia --  --    Hyperlipidemia 11/06/2018 --    Mitral Valve Disorders --  --    Osteopenia 04/29/2019 --    Renal calculus or stone --  --    Sarcoidosis --  --    Screening Mammogram 12/2018 --    Sinus trouble --  --    Streptococcal Sore Throat --  --    Type 2 diabetes mellitus with hyperglycemia, without long-term current use of insulin 04/29/2019 --    Urinary tract infection --  --          Past Surgical History  Procedure Name Date Notes   Cholecystectomy --  --    Colonoscopy 2008 --    Cystoscopy --  --    Holmium Lasertripsy --  --    lung biopsy 1992 Lung Biopsy right lobe/non cancerous tumor         Medication List  Name Date Started Instructions   Calcium 600 600 mg calcium (1,500 mg) oral tablet 11/06/2018 take 1 tablet by oral route 2 times a day   carvedilol 3.125 mg oral tablet 03/26/2021 TAKE ONE TABLET BY MOUTH TWICE DAILY   Flonase Allergy Relief 50 mcg/actuation nasal spray,suspension 03/17/2021 spray 2 sprays (100 mcg) in each nostril by intranasal route once daily as needed   loratadine 10 mg oral tablet  take 1 tablet (10 mg) by oral route once daily   MegaRed Omega-3 Krill Oil oral  Taking QD   Protonix 40 mg oral tablet,delayed release (DR/EC) 10/08/2020 take 1 tablet (40 mg) by oral route once daily for 90 days   Trintellix 10 mg oral tablet  take 1 tablet (10 mg) by oral route once daily at the same time each day   Vitamin D3 10 mcg (400 unit) oral tablet  take 1 tablet by oral route daily         Allergy List  Allergen Name Date Reaction Notes   aspirin --  upset stomach --    Rocephin --  --  --          Family Medical History  Disease Name Relative/Age Notes   Heart Disease  --    Breast Neoplasm Mother/89   --          Social History  Finding Status  Start/Stop Quantity Notes   Alcohol Never --/-- --  --     --  --/-- --  --    Exercises regularly --  --/-- --  walk    --  --/-- --  2 daughters   Other Substance Use Never --/-- --  --    Tobacco Never --/-- --  --          Immunizations  NameDate Admin Mfg Trade Name Lot Number Route Inj VIS Given VIS Publication   Dkikdmhxw91/06/2020 SKB Fluarix, quadrivalent, preservative free [id number] IM RD 10/06/2020    Comments: Patient tolerated well   Tjomovqshhho34/31/2014 NE Not Entered  NE NE 11/10/2014    Comments: Pneumococcal conjugate   Ktbrniid49/11/2014 NE Not Entered  NE NE 12/15/2014    Comments:          Review of Systems  · Constitutional  o Denies  o : fever, fatigue, weight loss, weight gain  · Eyes  o Denies  o : blurred vision  · HENT  o Admits  o : nasal congestion, nasal discharge, postnasal drip  o Denies  o : headaches, neck pain, sore throat, ear fullness, difficulty swallowing  · Cardiovascular  o Denies  o : lower extremity edema, claudication, chest pressure, palpitations  · Respiratory  o Denies  o : shortness of breath, wheezing, cough, hemoptysis, dyspnea on exertion  · Gastrointestinal  o Denies  o : nausea, vomiting, diarrhea, constipation, abdominal pain  · Musculoskeletal  o Denies  o : limitation of motion  · Psychiatric  o Admits  o : anxiety  o Denies  o : depression, suicidal ideation, homicidal ideation      Physical Examination  · Constitutional  o Appearance  o : well developed, well-nourished, no acute distress  · Neck  o Inspection/Palpation  o : normal appearance, no masses or tenderness, trachea midline  o Thyroid  o : gland size normal, nontender, no nodules or masses present on palpation  · Respiratory  o Respiratory Effort  o : breathing unlabored  o Inspection of Chest  o : normal appearance, normal appearance, no retractions  o Auscultation of Lungs  o : clear to auscultation bilaterally throughout inspiration and expiration  · Cardiovascular  o Heart  o :    § Auscultation of Heart  § : regular rate and rhythm, no murmurs, gallops or rubs  § Palpation of Heart  § : normal apical impulse, no cardiac thrill present  o Peripheral Vascular System  o :   § Carotid Arteries  § : normal pulses bilaterally, no bruits present  § Pedal Pulses  § : bilateral pulse strength 2+  § Extremities  § : no edema, no cyanosis, no distal hair loss, normal capillary refill  · Psychiatric  o Mood and Affect  o : mood normal, affect appropriate          Assessment  · Allergic rhinitis due to allergen     477.9/J30.9  · Generalized anxiety disorder     300.02/F41.1  · Type 2 diabetes mellitus with hyperglycemia, without long-term current use of insulin       Type 2 diabetes mellitus with hyperglycemia     250.00/E11.65  · Essential hypertension     401.9/I10  · GERD (gastroesophageal reflux disease)     530.81/K21.9  · Hyperlipidemia     272.4/E78.5  · Fibrosis lung     515/J84.10  · Osteopenia     733.90/M85.80      Plan  · Orders  o ACO-39: Current medications updated and reviewed () - - 03/31/2021  o ACO-15: Pneumococcal Vaccine Administered or Previously Received (4040F) - - 03/31/2021  o ACO-14: Influenza immunization administered or previously received () - - 03/31/2021  o ACO-20: Screening Mammography documented and reviewed (3014F) - - 03/31/2021  o ACO-19: Colorectal cancer screening results documented and reviewed (3017F) - - 03/31/2021  o ACO-13: Fall Risk Screening with no falls in past year or only one fall without injury in the past year (1101F) - - 03/31/2021  · Medications  o Medications have been Reconciled  o Transition of Care or Provider Policy  · Instructions  o Continue blood sugar monitoring daily and record. Bring your log to office visits. Call the office for readings below 70 and above 250 or any complications.  o Maintain a healthy weight. Avoid tight fitting clothes. Avoid fried, fatty foods, tomato sauce, chocolate, mint, garlic, onion, alcohol.  caffeine. Eat smaller meals, dont lie down after a meal, dont smoke. Elevate the head of your bed 6-9 inches.  o Advised that cheeses and other sources of dairy fats, animal fats, fast food, and the extras (candy, pasteries, pies, doughnuts and cookies) all contain LDL raising nutrients. Advised to increase fruits, vegetables, whole grains, and to monitor portion sizes.   o Take all medications as prescribed/directed.  o Rest. Increase Fluids.  o Patient was educated/instructed on their diagnosis, treatment and medications prior to discharge from the clinic today.  o Patient instructed to seek medical attention urgently for new or worsening symptoms.  o Call the office with any concerns or questions.  o MINOMadisynUin 6 months for labs and appt. Call with any concerns or questions. She will call when she needs her GERD refills.I have reviewed all medications and at this time no medications changes need to be adjusted for all chronic conditions.  · Disposition  o Call or Return if symptoms worsen or persist.  o Return Visit Request in/on 6 months +/- 2 days (39113).            Electronically Signed by: KIAN Lyons -Author on March 31, 2021 10:27:21 AM

## 2021-05-14 NOTE — PROGRESS NOTES
Progress Note      Patient Name: Loretta Ren   Patient ID: [id number]   Sex: Female   YOB: 1955    Primary Care Provider: aNtalie LANGSTON   Referring Provider: Natalie LANGSTON    Visit Date: December 17, 2020    Provider: KIAN Lyons   Location: [facility]   Location Address: [address] [place] [address]  [id number]   Location Phone: [phone]          Chief Complaint     Sore throat  sinus pain above eyes       History Of Present Illness  Loretta Ren is a 65 year old /White female who presents for evaluation and treatment of:   TELEHEALTH TELEPHONE VISIT  Loretta Ren is a 65 year old /White female who is presenting for evaluation via telehealth telephone visit. Verbal consent obtained before beginning visit. CMartin lpn   Provider spent 1:56-2:08 minutes with patient during telehealth visit.   The following staff were present during this visit: Carol esteves, pt, KCW   Past Medical History/Overview of Patient Symptoms     Sunday she started with a sore throat and then yesterday she started to see white patches.  She has been taking temp and it was 98.1 and /71.  She has had a lot of sinus pressure.  No n/v.  She has had some dizziness.  Some coughing and tickle of the throat.    Her daughter had covid--her daughter has been bringing kids over to [place].  Her son in law now it has it.  She had a test in Sept and was neg.       Past Medical History  Disease Name Date Onset Notes   Abnormal CT of the chest 04/29/2019 --    Allergic rhinitis --  --    Allergic rhinitis due to allergen 11/06/2018 --    Anxiety 11/06/2017 improved since starting Trintellix.   Anxiety disorder 11/06/2018 --    Diabetes mellitus, type 2 11/06/2018 --    Dizziness 11/06/2017 The patient reports a transient bout of dizziness that she attributes to stress. her last bout of dizziness was in July 2017. I did review her CDs which were  unrevealing. I will request her records be sent for my review.   DM2 (diabetes mellitus, type 2) --  --    Duodenal ulcer --  --    Essential hypertension 11/06/2018 --    Facial pain 11/06/2017 The patient reports a transient bout of facial pain with radiation toward the right ear. She notes that this has resolved. I did personally review her CT face which was essentially unrevealing.   Fibrosis lung 11/06/2018 --    Generalized anxiety disorder 04/29/2019 --    GERD (gastroesophageal reflux disease) --  --    GERD (gastroesophageal reflux disease) 11/06/2018 --    Heart Murmur --  --    Hemorrhoids --  --    Hyperlipidemia --  --    Hyperlipidemia 11/06/2018 --    Mitral Valve Disorders --  --    Osteopenia 04/29/2019 --    Renal calculus or stone --  --    Sarcoidosis --  --    Screening Mammogram 12/2018 --    Sinus trouble --  --    Streptococcal Sore Throat --  --    Type 2 diabetes mellitus with hyperglycemia, without long-term current use of insulin 04/29/2019 --    Urinary Tract Infection --  --          Past Surgical History  Procedure Name Date Notes   Cholecystectomy --  --    Colonoscopy 2008 --    Cystoscopy --  --    Holmium Lasertripsy --  --    lung biopsy 1992 Lung Biopsy right lobe/non cancerous tumor         Medication List  Name Date Started Instructions   Calcium 600 600 mg calcium (1,500 mg) oral tablet 11/06/2018 take 1 tablet by oral route 2 times a day   carvedilol 3.125 mg oral tablet 08/25/2020 TAKE ONE TABLET BY MOUTH TWICE DAILY   Flonase Allergy Relief 50 mcg/actuation nasal spray,suspension 10/16/2019 spray 2 sprays (100 mcg) in each nostril by intranasal route once daily as needed   loratadine 10 mg oral tablet  take 1 tablet (10 mg) by oral route once daily   MegaRed Omega-3 Krill Oil oral  Taking QD   Protonix 40 mg oral tablet,delayed release (DR/EC) 10/08/2020 take 1 tablet (40 mg) by oral route once daily for 90 days   Trintellix 10 mg oral tablet 04/13/2020 take 1 tablet (10  mg) by oral route once daily at the same time each day for 90 days   Vitamin D3 10 mcg (400 unit) oral tablet  take 1 tablet by oral route daily         Allergy List  Allergen Name Date Reaction Notes   aspirin --  upset stomach --    Rocephin --  --  --          Family Medical History  Disease Name Relative/Age Notes   Heart Disease  --    Breast Neoplasm Mother/89   --          Social History  Finding Status Start/Stop Quantity Notes   Alcohol Never --/-- --  --     --  --/-- --  --    Exercises regularly --  --/-- --  walk    --  --/-- --  2 daughters   Other Substance Use Never --/-- --  --    Tobacco Never --/-- --  --          Immunizations  NameDate Admin Mfg Trade Name Lot Number Route Inj VIS Given VIS Publication   Tlcnkguye72/06/2020 SKB Fluarix, quadrivalent, preservative free [id number] IM RD 10/06/2020    Comments: Patient tolerated well   Hglwevveduyz71/31/2014 NE Not Entered  NE NE 11/10/2014    Comments: Pneumococcal conjugate   Ynundckz10/11/2014 NE Not Entered  NE NE 12/15/2014    Comments:          Review of Systems  · Constitutional  o Admits  o : fatigue  o Denies  o : fever, weight loss, weight gain  · HENT  o Admits  o : headaches (over her eyes), sinus pain, nasal congestion  · Cardiovascular  o Denies  o : lower extremity edema, claudication, chest pressure, palpitations  · Respiratory  o Admits  o : cough  o Denies  o : shortness of breath, wheezing, hemoptysis, dyspnea on exertion  · Gastrointestinal  o Denies  o : nausea, vomiting, diarrhea, constipation, abdominal pain  · Integument  o Denies  o : rash, itching          Results  · In-Office Procedures  o Lab procedure  § IOP - Rapid Strep (37016)   § Beta Strep Gp A Culture: Negative   § Internal Control Verified?: Yes       Assessment  · Cough     786.2/R05  · Exposure to COVID-19 virus     V01.79/Z20.828  · Sore throat     462/J02.9  · Sinus congestion     478.19/R09.81      Plan  · Orders  o [facility] NCOV2  (send-out) (17044) - V01.79/Z20.828 - 12/17/2020  o ACO-39: Current medications updated and reviewed (1159F, ) - - 12/17/2020  o ACO-14: Influenza immunization administered or previously received [facility] () - - 12/17/2020  o Physician Telephone Evaluation, 11-20 minutes (79197) - - 12/17/2020  o Covid Testing at Non-[facility] facility (30513) - - 12/17/2020  · Medications  o Augmentin 875-125 mg oral tablet   SIG: take 1 tablet by oral route every 12 hours for 10 days   DISP: (20) Tablet with 0 refills  Prescribed on 12/17/2020     o metformin 500 mg oral tablet extended release 24 hr   SIG: take 1 tablet (500 mg) by oral route once daily with the evening meal for 90 days   DISP: (90) Tablet with 1 refills  Discontinued on 12/17/2020     o Medications have been Reconciled  o Transition of Care or Provider Policy  · Instructions  o Patient was educated/instructed on their diagnosis, treatment and medications prior to discharge from the clinic today.  o Patient instructed to seek medical attention urgently for new or worsening symptoms.  o Call the office with any concerns or questions.  o Discussed Covid-19 precautions including, but not limited to, social distancing, avoid touching your face, and hand washing.   o Plan Of Care:   o She will start zinc and vit c. She will come in for covid and strep test. Discussed about covid quarantine.   · Disposition  o Call or Return if symptoms worsen or persist.            Electronically Signed by: KIAN Lyons -Author on December 17, 2020 03:11:27 PM

## 2021-05-15 VITALS
BODY MASS INDEX: 26.71 KG/M2 | SYSTOLIC BLOOD PRESSURE: 129 MMHG | HEIGHT: 61 IN | DIASTOLIC BLOOD PRESSURE: 85 MMHG | OXYGEN SATURATION: 98 % | RESPIRATION RATE: 12 BRPM | TEMPERATURE: 97.5 F | HEART RATE: 80 BPM | WEIGHT: 141.44 LBS

## 2021-05-15 VITALS
DIASTOLIC BLOOD PRESSURE: 80 MMHG | OXYGEN SATURATION: 97 % | BODY MASS INDEX: 27.39 KG/M2 | HEIGHT: 61 IN | TEMPERATURE: 98.7 F | SYSTOLIC BLOOD PRESSURE: 134 MMHG | RESPIRATION RATE: 12 BRPM | HEART RATE: 75 BPM | WEIGHT: 145.06 LBS

## 2021-05-15 VITALS
TEMPERATURE: 98 F | SYSTOLIC BLOOD PRESSURE: 132 MMHG | BODY MASS INDEX: 27.21 KG/M2 | WEIGHT: 144.12 LBS | DIASTOLIC BLOOD PRESSURE: 76 MMHG | RESPIRATION RATE: 18 BRPM | HEART RATE: 78 BPM | HEIGHT: 61 IN | OXYGEN SATURATION: 98 %

## 2021-05-15 VITALS
HEART RATE: 82 BPM | WEIGHT: 141 LBS | BODY MASS INDEX: 26.62 KG/M2 | HEIGHT: 61 IN | DIASTOLIC BLOOD PRESSURE: 72 MMHG | SYSTOLIC BLOOD PRESSURE: 130 MMHG

## 2021-05-15 VITALS
HEIGHT: 61 IN | HEART RATE: 80 BPM | SYSTOLIC BLOOD PRESSURE: 112 MMHG | DIASTOLIC BLOOD PRESSURE: 78 MMHG | BODY MASS INDEX: 27.19 KG/M2 | WEIGHT: 144 LBS

## 2021-05-15 VITALS
TEMPERATURE: 98.7 F | RESPIRATION RATE: 12 BRPM | BODY MASS INDEX: 27.27 KG/M2 | WEIGHT: 144.44 LBS | SYSTOLIC BLOOD PRESSURE: 131 MMHG | OXYGEN SATURATION: 93 % | DIASTOLIC BLOOD PRESSURE: 80 MMHG | HEIGHT: 61 IN | HEART RATE: 81 BPM

## 2021-05-16 VITALS
SYSTOLIC BLOOD PRESSURE: 136 MMHG | RESPIRATION RATE: 12 BRPM | TEMPERATURE: 98.4 F | BODY MASS INDEX: 26.34 KG/M2 | DIASTOLIC BLOOD PRESSURE: 73 MMHG | WEIGHT: 139.5 LBS | HEART RATE: 78 BPM | OXYGEN SATURATION: 96 % | HEIGHT: 61 IN

## 2021-05-16 VITALS
WEIGHT: 142.44 LBS | SYSTOLIC BLOOD PRESSURE: 130 MMHG | TEMPERATURE: 98 F | DIASTOLIC BLOOD PRESSURE: 76 MMHG | RESPIRATION RATE: 12 BRPM | OXYGEN SATURATION: 97 % | BODY MASS INDEX: 26.89 KG/M2 | HEIGHT: 61 IN | HEART RATE: 82 BPM

## 2021-05-16 VITALS
WEIGHT: 133.06 LBS | OXYGEN SATURATION: 98 % | SYSTOLIC BLOOD PRESSURE: 115 MMHG | HEIGHT: 61 IN | DIASTOLIC BLOOD PRESSURE: 75 MMHG | TEMPERATURE: 97.7 F | BODY MASS INDEX: 25.12 KG/M2 | HEART RATE: 84 BPM | RESPIRATION RATE: 12 BRPM

## 2021-05-16 VITALS
SYSTOLIC BLOOD PRESSURE: 132 MMHG | TEMPERATURE: 98.4 F | BODY MASS INDEX: 25.34 KG/M2 | RESPIRATION RATE: 12 BRPM | WEIGHT: 134.25 LBS | OXYGEN SATURATION: 98 % | HEIGHT: 61 IN | DIASTOLIC BLOOD PRESSURE: 74 MMHG | HEART RATE: 66 BPM

## 2021-05-16 VITALS
DIASTOLIC BLOOD PRESSURE: 76 MMHG | BODY MASS INDEX: 26.69 KG/M2 | HEIGHT: 61 IN | RESPIRATION RATE: 16 BRPM | TEMPERATURE: 98.5 F | HEART RATE: 86 BPM | SYSTOLIC BLOOD PRESSURE: 121 MMHG | WEIGHT: 141.37 LBS | OXYGEN SATURATION: 97 %

## 2021-05-16 VITALS
HEIGHT: 61 IN | SYSTOLIC BLOOD PRESSURE: 122 MMHG | HEART RATE: 76 BPM | BODY MASS INDEX: 25.86 KG/M2 | DIASTOLIC BLOOD PRESSURE: 90 MMHG | WEIGHT: 137 LBS

## 2021-05-16 VITALS
TEMPERATURE: 98.8 F | OXYGEN SATURATION: 98 % | DIASTOLIC BLOOD PRESSURE: 70 MMHG | HEIGHT: 61 IN | HEART RATE: 76 BPM | SYSTOLIC BLOOD PRESSURE: 118 MMHG | WEIGHT: 134.31 LBS | RESPIRATION RATE: 16 BRPM | BODY MASS INDEX: 25.36 KG/M2

## 2021-05-16 VITALS
HEART RATE: 82 BPM | BODY MASS INDEX: 27 KG/M2 | SYSTOLIC BLOOD PRESSURE: 124 MMHG | DIASTOLIC BLOOD PRESSURE: 84 MMHG | WEIGHT: 143 LBS | HEIGHT: 61 IN

## 2021-06-28 ENCOUNTER — TELEPHONE (OUTPATIENT)
Dept: INTERNAL MEDICINE | Facility: CLINIC | Age: 66
End: 2021-06-28

## 2021-06-28 RX ORDER — PANTOPRAZOLE SODIUM 40 MG/1
40 TABLET, DELAYED RELEASE ORAL DAILY
Qty: 90 TABLET | Refills: 1 | Status: SHIPPED | OUTPATIENT
Start: 2021-06-28 | End: 2021-12-22

## 2021-06-28 RX ORDER — PANTOPRAZOLE SODIUM 40 MG/1
40 TABLET, DELAYED RELEASE ORAL DAILY
COMMUNITY
End: 2021-06-28 | Stop reason: SDUPTHER

## 2021-06-28 NOTE — TELEPHONE ENCOUNTER
Caller: Loretta Ren    Relationship: Self    Best call back number: [phone]     Medication needed:   PROTONIX     When do you need the refill by: ASAP  Does the patient have less than a 3 day supply:  [x] Yes  [] No    What is the patient's preferred pharmacy: [facility] [place], [place] - [address] [facility] [address] - [phone] [facility] [phone] FX

## 2021-08-04 PROBLEM — E78.5 HYPERLIPIDEMIA: Status: ACTIVE | Noted: 2018-11-06

## 2021-08-04 PROBLEM — I49.3 PVC (PREMATURE VENTRICULAR CONTRACTION): Status: ACTIVE | Noted: 2021-08-04

## 2021-08-04 PROBLEM — I10 ESSENTIAL HYPERTENSION: Status: ACTIVE | Noted: 2021-08-04

## 2021-08-04 NOTE — PROGRESS NOTES
Chief Complaint  Hypertension    Subjective    Patient is a 66-year-old female following up today she doing well no active complaints    Past Medical History:   Diagnosis Date   • Abnormal CT of the chest 04/29/2019   • Allergic rhinitis    • Allergic rhinitis due to allergen 11/06/2018   • Anxiety 11/06/2017    IMPROVED SINCE STARTING TRINTELLIX   • Anxiety disorder 11/06/2018   • Diabetes mellitus (CMS/HCC) 11/06/2018   • Dizziness 11/06/2017    THE PATIENT REPORTS A TRANSIENT BOUT OF DIZZINESS THAT SHE ATTRIBUTES TO STRESS. HER LAST BOUT OF DIZZINESS WAS IN JULY 2017.  I DID REVIEW HER CDS WHICH WERE UNREVEALING. I WILL REQUEST RECORES TO BE SENT TO REVIEW   • DM2 (diabetes mellitus, type 2) (CMS/[facility])    • Duodenal ulcer    • Essential hypertension 11/06/2018   • Facial pain 11/06/2017    THE PATIENT REPORTS A TRANSIENT BOUT OF FACIAL PAIN WITH RADIATION TOEARDS THE RIGHT EAR. SHE NOTES THAT THIS HAS RESOLVED. I DID PERSOANLLY REVIEW HER CT FACE WHICH WAS ESSENTIALLY UNREVELAING   • Fibrosis lung (CMS/[facility]) 11/06/2018   • Generalized anxiety disorder 04/29/2019   • GERD (gastroesophageal reflux disease) 11/06/2018   • Heart murmur    • Hemorrhoids    • Hyperlipidemia 11/06/2018   • Mitral valve disorder    • Osteopenia 04/29/2019   • Other screening mammogram 12/2018   • PVC (premature ventricular contraction) 8/4/2021   • Renal calculus or stone    • Sarcoidosis    • Sinus trouble    • Streptococcal sore throat    • Type 2 diabetes mellitus with hyperglycemia, without long-term current use of insulin (CMS/[facility]) 04/29/2019   • Urinary tract infection          Current Outpatient Medications:   •  Calcium 500-100 MG-UNIT chewable tablet, Chew 1 tablet Every 4 (Four) Hours As Needed., Disp: , Rfl:   •  carvedilol (COREG) 3.125 MG tablet, Take 1 tablet by mouth 2 (Two) Times a Day., Disp: 90 tablet, Rfl: 3  •  Cobalamin Combinations (B-12) 100-5000 MCG sublingual tablet, B12 5,000-100 mcg sublingual lozenge dissolve 1  "lozenge by sublingual route daily   Suspended, Disp: , Rfl:   •  fluticasone (FLONASE) 50 MCG/ACT nasal spray, USE TWO SPRAYS IN EACH NOSTRIL DAILY AS NEEDED, Disp: , Rfl:   •  MegaRed Omega-3 Krill Oil 350 MG capsule, MegaRed Omega-3 Krill Oil oral Taking QD   Active, Disp: , Rfl:   •  pantoprazole (PROTONIX) 40 MG EC tablet, Take 1 tablet by mouth Daily., Disp: 90 tablet, Rfl: 1  •  Vortioxetine HBr (Trintellix) 10 MG tablet, Trintellix 10 mg oral tablet take 1 tablet (10 mg) by oral route once daily at the same time each day   Active, Disp: , Rfl:     Medications Discontinued During This Encounter   Medication Reason   • carvedilol (COREG) 3.125 MG tablet Reorder     Allergies   Allergen Reactions   • Aspirin GI Intolerance   • Ceftriaxone Rash        Social History     Tobacco Use   • Smoking status: Never Smoker   • Smokeless tobacco: Never Used   Vaping Use   • Vaping Use: Never used   Substance Use Topics   • Alcohol use: Never   • Drug use: Never       Family History   Problem Relation Age of Onset   • Breast cancer Mother    • Heart disease Other         Objective     /74 (BP Location: Right arm, Patient Position: Sitting)   Pulse 74   Ht 154.9 cm (61\")   Wt 63 kg (139 lb)   BMI 26.26 kg/m²       Physical Exam    General Appearance:   · no acute distress  · Alert and oriented x3  HENT:   · lips not cyanotic  · Atraumatic  Neck:  · No jvd   · supple  Respiratory:  · no respiratory distress  · normal breath sounds  · no rales  Cardiovascular:  · Regular rate and rhythm  · no S3, no S4   · no murmur  · no rub  Extremities  · No cyanosis  · lower extremity edema: none    Skin:   · warm, dry  · No rashes      Result Review :     No results found for: PROBNP  CMP    CMP 10/6/20 3/26/21   Glucose 133 (A) 120 (A)   BUN 12 17   Creatinine 0.76 0.87   Sodium 141 137   Potassium 4.6 4.0   Chloride 104 101   Calcium 9.2 9.3   Albumin 4.4 4.0   Total Bilirubin 0.37 1.03   Alkaline Phosphatase 90 74   AST " (SGOT) 23 23   ALT (SGPT) 27 31   (A) Abnormal value            CBC w/diff    CBC w/Diff 10/6/20 3/26/21   WBC 6.67 9.20   RBC 5.12 5.17   Hemoglobin 15.1 15.6   Hematocrit 48.0 (A) 47.8 (A)   MCV 93.8 92.5   MCH 29.5 30.2   MCHC 31.5 (A) 32.6 (A)   RDW 13.0 13.0   Platelets 206 211   Neutrophil Rel % 65.2 59.9   Lymphocyte Rel % 25.3 30.4   Monocyte Rel % 7.8 8.2   Eosinophil Rel % 1.5 0.8   Basophil Rel % 0.1 0.2   (A) Abnormal value             Lab Results   Component Value Date    TSH 1.680 03/26/2021      Lab Results   Component Value Date    FREET4 1.7 03/26/2021      No results found for: DDIMERQUANT  Magnesium   Date Value Ref Range Status   01/18/2019 2.13 1.60 - 2.30 mg/dL Final      No results found for: DIGOXIN   No results found for: TROPONINT        Lipid Panel    Lipid Panel 10/6/20 3/26/21   Total Cholesterol 167 164   Triglycerides 137 137   HDL Cholesterol 47 49   VLDL Cholesterol 27 27   LDL Cholesterol  93 88      Comments are available for some flowsheets but are not being displayed.           No results found for: POCTROP                   Diagnoses and all orders for this visit:    1. Essential hypertension (Primary)  Assessment & Plan:  Controlled continue with her current antihypertensive      2. PVC (premature ventricular contraction)  Assessment & Plan:  Symptoms stable on Coreg 3.25 mg twice daily check EKG on next visit      Other orders  -     carvedilol (COREG) 3.125 MG tablet; Take 1 tablet by mouth 2 (Two) Times a Day.  Dispense: 90 tablet; Refill: 3          Follow Up     Return in about 1 year (around 8/10/2022) for Follow with Pinky Thompson, EKG with F/U.          Patient was given instructions and counseling regarding her condition or for health maintenance advice. Please see specific information pulled into the AVS if appropriate.

## 2021-08-10 ENCOUNTER — OFFICE VISIT (OUTPATIENT)
Dept: CARDIOLOGY | Facility: CLINIC | Age: 66
End: 2021-08-10

## 2021-08-10 VITALS
HEART RATE: 74 BPM | DIASTOLIC BLOOD PRESSURE: 74 MMHG | HEIGHT: 61 IN | SYSTOLIC BLOOD PRESSURE: 114 MMHG | WEIGHT: 139 LBS | BODY MASS INDEX: 26.24 KG/M2

## 2021-08-10 DIAGNOSIS — I10 ESSENTIAL HYPERTENSION: Primary | ICD-10-CM

## 2021-08-10 DIAGNOSIS — I49.3 PVC (PREMATURE VENTRICULAR CONTRACTION): ICD-10-CM

## 2021-08-10 PROCEDURE — 99214 OFFICE O/P EST MOD 30 MIN: CPT | Performed by: INTERNAL MEDICINE

## 2021-08-10 RX ORDER — FLUTICASONE PROPIONATE 50 MCG
2 SPRAY, SUSPENSION (ML) NASAL DAILY PRN
COMMUNITY
Start: 2021-06-29

## 2021-08-10 RX ORDER — CARVEDILOL 3.12 MG/1
3.12 TABLET ORAL 2 TIMES DAILY
COMMUNITY
Start: 2021-07-27 | End: 2021-08-10 | Stop reason: SDUPTHER

## 2021-08-10 RX ORDER — VITAMIN E 200 UNIT
CAPSULE ORAL
COMMUNITY
End: 2022-10-04

## 2021-08-10 RX ORDER — CARVEDILOL 3.12 MG/1
3.12 TABLET ORAL 2 TIMES DAILY
Qty: 90 TABLET | Refills: 3 | Status: SHIPPED | OUTPATIENT
Start: 2021-08-10 | End: 2022-08-10 | Stop reason: SDUPTHER

## 2021-08-10 RX ORDER — AMOXICILLIN 250 MG
CAPSULE ORAL
COMMUNITY
End: 2022-10-04

## 2021-08-10 RX ORDER — VORTIOXETINE 10 MG/1
TABLET, FILM COATED ORAL
COMMUNITY
End: 2022-01-25 | Stop reason: SDUPTHER

## 2021-09-22 ENCOUNTER — TELEPHONE (OUTPATIENT)
Dept: FAMILY MEDICINE CLINIC | Facility: CLINIC | Age: 66
End: 2021-09-22

## 2021-09-22 DIAGNOSIS — E78.2 MIXED HYPERLIPIDEMIA: ICD-10-CM

## 2021-09-22 DIAGNOSIS — E11.9 TYPE 2 DIABETES MELLITUS WITHOUT COMPLICATION, WITHOUT LONG-TERM CURRENT USE OF INSULIN (HCC): ICD-10-CM

## 2021-09-22 DIAGNOSIS — I10 ESSENTIAL HYPERTENSION: Primary | ICD-10-CM

## 2021-09-22 PROBLEM — F41.1 GENERALIZED ANXIETY DISORDER: Status: ACTIVE | Noted: 2018-11-06

## 2021-09-22 PROBLEM — R01.1 HEART MURMUR: Status: ACTIVE | Noted: 2021-09-22

## 2021-09-22 PROBLEM — J84.10 FIBROSIS LUNG: Status: ACTIVE | Noted: 2018-11-06

## 2021-09-22 PROBLEM — M85.80 OSTEOPENIA: Status: ACTIVE | Noted: 2019-04-29

## 2021-09-22 PROBLEM — D86.9 SARCOIDOSIS: Status: ACTIVE | Noted: 2021-09-22

## 2021-09-22 PROBLEM — K21.9 GERD (GASTROESOPHAGEAL REFLUX DISEASE): Status: ACTIVE | Noted: 2018-11-06

## 2021-09-22 PROBLEM — J30.9 ALLERGIC RHINITIS: Status: ACTIVE | Noted: 2018-11-06

## 2021-09-22 NOTE — TELEPHONE ENCOUNTER
PATIENT CALLED AND IS REQUESTING LAB ORDER SO SHE CAN GET LABS DONE ON 9/27 AT [facility].  SHE HAS APPT WITH YOU ON 9/30

## 2021-09-27 ENCOUNTER — LAB (OUTPATIENT)
Dept: LAB | Facility: HOSPITAL | Age: 66
End: 2021-09-27

## 2021-09-27 DIAGNOSIS — E11.9 TYPE 2 DIABETES MELLITUS WITHOUT COMPLICATION, WITHOUT LONG-TERM CURRENT USE OF INSULIN (HCC): ICD-10-CM

## 2021-09-27 DIAGNOSIS — I10 ESSENTIAL HYPERTENSION: ICD-10-CM

## 2021-09-27 DIAGNOSIS — E78.2 MIXED HYPERLIPIDEMIA: ICD-10-CM

## 2021-09-27 LAB
ALBUMIN SERPL-MCNC: 4.8 G/DL (ref 3.5–5.2)
ALBUMIN UR-MCNC: 15.1 MG/DL
ALBUMIN/GLOB SERPL: 1.8 G/DL
ALP SERPL-CCNC: 100 U/L (ref 39–117)
ALT SERPL W P-5'-P-CCNC: 27 U/L (ref 1–33)
ANION GAP SERPL CALCULATED.3IONS-SCNC: 10 MMOL/L (ref 5–15)
AST SERPL-CCNC: 24 U/L (ref 1–32)
BASOPHILS # BLD AUTO: 0.01 10*3/MM3 (ref 0–0.2)
BASOPHILS NFR BLD AUTO: 0.2 % (ref 0–1.5)
BILIRUB SERPL-MCNC: 0.6 MG/DL (ref 0–1.2)
BUN SERPL-MCNC: 12 MG/DL (ref 8–23)
BUN/CREAT SERPL: 24.5 (ref 7–25)
CALCIUM SPEC-SCNC: 9.9 MG/DL (ref 8.6–10.5)
CHLORIDE SERPL-SCNC: 103 MMOL/L (ref 98–107)
CHOLEST SERPL-MCNC: 196 MG/DL (ref 0–200)
CO2 SERPL-SCNC: 26 MMOL/L (ref 22–29)
CREAT SERPL-MCNC: 0.49 MG/DL (ref 0.57–1)
DEPRECATED RDW RBC AUTO: 42.2 FL (ref 37–54)
EOSINOPHIL # BLD AUTO: 0.13 10*3/MM3 (ref 0–0.4)
EOSINOPHIL NFR BLD AUTO: 2 % (ref 0.3–6.2)
ERYTHROCYTE [DISTWIDTH] IN BLOOD BY AUTOMATED COUNT: 12.9 % (ref 12.3–15.4)
GFR SERPL CREATININE-BSD FRML MDRD: 126 ML/MIN/1.73
GLOBULIN UR ELPH-MCNC: 2.6 GM/DL
GLUCOSE SERPL-MCNC: 142 MG/DL (ref 65–99)
HBA1C MFR BLD: 7.1 % (ref 4.8–5.6)
HCT VFR BLD AUTO: 46.2 % (ref 34–46.6)
HDLC SERPL-MCNC: 41 MG/DL (ref 40–60)
HGB BLD-MCNC: 15.8 G/DL (ref 12–15.9)
IMM GRANULOCYTES # BLD AUTO: 0.01 10*3/MM3 (ref 0–0.05)
IMM GRANULOCYTES NFR BLD AUTO: 0.2 % (ref 0–0.5)
LDLC SERPL CALC-MCNC: 128 MG/DL (ref 0–100)
LDLC/HDLC SERPL: 3.04 {RATIO}
LYMPHOCYTES # BLD AUTO: 1.75 10*3/MM3 (ref 0.7–3.1)
LYMPHOCYTES NFR BLD AUTO: 26.8 % (ref 19.6–45.3)
MCH RBC QN AUTO: 30.7 PG (ref 26.6–33)
MCHC RBC AUTO-ENTMCNC: 34.2 G/DL (ref 31.5–35.7)
MCV RBC AUTO: 89.7 FL (ref 79–97)
MONOCYTES # BLD AUTO: 0.48 10*3/MM3 (ref 0.1–0.9)
MONOCYTES NFR BLD AUTO: 7.4 % (ref 5–12)
NEUTROPHILS NFR BLD AUTO: 4.14 10*3/MM3 (ref 1.7–7)
NEUTROPHILS NFR BLD AUTO: 63.4 % (ref 42.7–76)
NRBC BLD AUTO-RTO: 0 /100 WBC (ref 0–0.2)
PLATELET # BLD AUTO: 234 10*3/MM3 (ref 140–450)
PMV BLD AUTO: 11.4 FL (ref 6–12)
POTASSIUM SERPL-SCNC: 4.8 MMOL/L (ref 3.5–5.2)
PROT SERPL-MCNC: 7.4 G/DL (ref 6–8.5)
RBC # BLD AUTO: 5.15 10*6/MM3 (ref 3.77–5.28)
SODIUM SERPL-SCNC: 139 MMOL/L (ref 136–145)
T4 FREE SERPL-MCNC: 1.31 NG/DL (ref 0.93–1.7)
TRIGL SERPL-MCNC: 151 MG/DL (ref 0–150)
TSH SERPL DL<=0.05 MIU/L-ACNC: 1.9 UIU/ML (ref 0.27–4.2)
VLDLC SERPL-MCNC: 27 MG/DL (ref 5–40)
WBC # BLD AUTO: 6.52 10*3/MM3 (ref 3.4–10.8)

## 2021-09-27 PROCEDURE — 84443 ASSAY THYROID STIM HORMONE: CPT

## 2021-09-27 PROCEDURE — 84439 ASSAY OF FREE THYROXINE: CPT

## 2021-09-27 PROCEDURE — 82043 UR ALBUMIN QUANTITATIVE: CPT

## 2021-09-27 PROCEDURE — 80053 COMPREHEN METABOLIC PANEL: CPT

## 2021-09-27 PROCEDURE — 36415 COLL VENOUS BLD VENIPUNCTURE: CPT

## 2021-09-27 PROCEDURE — 80061 LIPID PANEL: CPT

## 2021-09-27 PROCEDURE — 83036 HEMOGLOBIN GLYCOSYLATED A1C: CPT

## 2021-09-27 PROCEDURE — 85025 COMPLETE CBC W/AUTO DIFF WBC: CPT

## 2021-09-30 ENCOUNTER — OFFICE VISIT (OUTPATIENT)
Dept: FAMILY MEDICINE CLINIC | Facility: CLINIC | Age: 66
End: 2021-09-30

## 2021-09-30 VITALS
TEMPERATURE: 96.8 F | HEART RATE: 89 BPM | RESPIRATION RATE: 12 BRPM | BODY MASS INDEX: 26.28 KG/M2 | WEIGHT: 139.2 LBS | SYSTOLIC BLOOD PRESSURE: 136 MMHG | HEIGHT: 61 IN | OXYGEN SATURATION: 96 % | DIASTOLIC BLOOD PRESSURE: 75 MMHG

## 2021-09-30 DIAGNOSIS — I10 ESSENTIAL HYPERTENSION: Primary | ICD-10-CM

## 2021-09-30 DIAGNOSIS — Z78.0 POSTMENOPAUSAL: ICD-10-CM

## 2021-09-30 DIAGNOSIS — F41.1 GENERALIZED ANXIETY DISORDER: ICD-10-CM

## 2021-09-30 DIAGNOSIS — E78.2 MIXED HYPERLIPIDEMIA: ICD-10-CM

## 2021-09-30 DIAGNOSIS — K21.9 GASTROESOPHAGEAL REFLUX DISEASE WITHOUT ESOPHAGITIS: ICD-10-CM

## 2021-09-30 DIAGNOSIS — J30.1 SEASONAL ALLERGIC RHINITIS DUE TO POLLEN: ICD-10-CM

## 2021-09-30 DIAGNOSIS — Z23 NEED FOR INFLUENZA VACCINATION: ICD-10-CM

## 2021-09-30 DIAGNOSIS — E11.9 TYPE 2 DIABETES MELLITUS WITHOUT COMPLICATION, WITHOUT LONG-TERM CURRENT USE OF INSULIN (HCC): ICD-10-CM

## 2021-09-30 PROCEDURE — G0008 ADMIN INFLUENZA VIRUS VAC: HCPCS | Performed by: NURSE PRACTITIONER

## 2021-09-30 PROCEDURE — 99214 OFFICE O/P EST MOD 30 MIN: CPT | Performed by: NURSE PRACTITIONER

## 2021-09-30 PROCEDURE — 90662 IIV NO PRSV INCREASED AG IM: CPT | Performed by: NURSE PRACTITIONER

## 2021-09-30 NOTE — PROGRESS NOTES
Chief Complaint  Follow-up (6 month) and Eye Problem (eye pain and redness)    Subjective          Loretta Ren presents to [facility]  History of Present Illness   Here for f.u for labs results.  She had to get a new freezer.    She went to see the eye dr and she is doing better but that was 3 weeks ago.  She is using the drops but is still having some issue on the left eye feeling swollen and a little red.  She does not have pain in the eye.    Allergies:  She is doing ok but she is having some congestion/drainage.  She is taking her flonase.    Depression: She is doing well with current trintellix but she is wondering if the med is causing the eye issue.  DM:  Well controlled with Diet and she did get at [facility].    LIPID:  She has been eating healthier but knows her chol is elevated this time.    HTN: She is doing good with current correg.  GERD:  She is taking her protonix.  n/v.    SHe is still seeing cardio.       Depression: Not at risk   • PHQ-2 Score: 0       Past Medical History:   • Abnormal CT of the chest   • Allergic rhinitis   • Allergic rhinitis due to allergen   • Anxiety    IMPROVED SINCE STARTING TRINTELLIX   • Anxiety disorder   • Diabetes mellitus (CMS/HCC)   • Dizziness    THE PATIENT REPORTS A TRANSIENT BOUT OF DIZZINESS THAT SHE ATTRIBUTES TO STRESS. HER LAST BOUT OF DIZZINESS WAS IN JULY 2017.  I DID REVIEW HER CDS WHICH WERE UNREVEALING. I WILL REQUEST RECORES TO BE SENT TO REVIEW   • DM2 (diabetes mellitus, type 2) (CMS/HCC)   • Duodenal ulcer   • Essential hypertension   • Facial pain    THE PATIENT REPORTS A TRANSIENT BOUT OF FACIAL PAIN WITH RADIATION TOEARDS THE RIGHT EAR. SHE NOTES THAT THIS HAS RESOLVED. I DID PERSOANLLY REVIEW HER CT FACE WHICH WAS ESSENTIALLY UNREVELAING   • Fibrosis lung (CMS/HCC)   • Generalized anxiety disorder   • GERD (gastroesophageal reflux disease)   • Heart murmur   • Hemorrhoids   • Hyperlipidemia   • Mitral  valve disorder   • Osteopenia   • Other screening mammogram   • PVC (premature ventricular contraction)   • Renal calculus or stone   • Sarcoidosis   • Sinus trouble   • Streptococcal sore throat   • Type 2 diabetes mellitus with hyperglycemia, without long-term current use of insulin (CMS/HCC)   • Urinary tract infection       Allergies  Aspirin and Ceftriaxone    Past Surgical History:   • CHOLECYSTECTOMY   • COLONOSCOPY   • CYSTOSCOPY   • LUNG BIOPSY   • PROSTATE HOLMIUM LASER ABLATION       Social History     Tobacco Use   • Smoking status: Never Smoker   • Smokeless tobacco: Never Used   Vaping Use   • Vaping Use: Never used   Substance Use Topics   • Alcohol use: Never   • Drug use: Never       Family History   Problem Relation Age of Onset   • Breast cancer Mother    • Heart disease Other         Health Maintenance Due   Topic Date Due   • COLORECTAL CANCER SCREENING  Never done   • Pneumococcal Vaccine 65+ (1 of 2 - PPSV23) 04[birth year]10/1959   • COVID-19 Vaccine (1) Never done   • TDAP/TD VACCINES (1 - Tdap) Never done   • ZOSTER VACCINE (2 of 2) 02/05/2015   • ANNUAL WELLNESS VISIT  Never done   • DIABETIC FOOT EXAM  Never done          Current Outpatient Medications:   •  Calcium 500-100 MG-UNIT chewable tablet, Chew 1 tablet Every 4 (Four) Hours As Needed., Disp: , Rfl:   •  carvedilol (COREG) 3.125 MG tablet, Take 1 tablet by mouth 2 (Two) Times a Day., Disp: 90 tablet, Rfl: 3  •  Cobalamin Combinations (B-12) 100-5000 MCG sublingual tablet, B12 5,000-100 mcg sublingual lozenge dissolve 1 lozenge by sublingual route daily   Suspended, Disp: , Rfl:   •  fluticasone (FLONASE) 50 MCG/ACT nasal spray, USE TWO SPRAYS IN EACH NOSTRIL DAILY AS NEEDED, Disp: , Rfl:   •  MegaRed Omega-3 Krill Oil 350 MG capsule, MegaRed Omega-3 Krill Oil oral Taking QD   Active, Disp: , Rfl:   •  pantoprazole (PROTONIX) 40 MG EC tablet, Take 1 tablet by mouth Daily., Disp: 90 tablet, Rfl: 1  •  Vortioxetine HBr (Trintellix) 10 MG  tablet, Trintellix 10 mg oral tablet take 1 tablet (10 mg) by oral route once daily at the same time each day   Active, Disp: , Rfl:     There are no discontinued medications.    Immunization History   Administered Date(s) Administered   • Influenza, Unspecified 10/06/2020   • PEDS-Pneumococcal Conjugate (PCV7) 10/31/2014   • Shingrix 12/11/2014       Review of Systems   Constitutional: Negative for fever.   Cardiovascular: Negative for chest pain.   Gastrointestinal: Negative for nausea and vomiting.        Objective       Vitals:    09/30/21 0857   BP: 136/75   Pulse: 89   Resp: 12   Temp: 96.8 °F (36 °C)   SpO2: 96%     Body mass index is 26.3 kg/m².         Physical Exam  Vitals reviewed.   Constitutional:       Appearance: Normal appearance. She is well-developed.   HENT:      Right Ear: Tympanic membrane normal.      Left Ear: Tympanic membrane normal.      Nose: Congestion and rhinorrhea present.      Mouth/Throat:      Pharynx: No oropharyngeal exudate.   Eyes:      Pupils: Pupils are equal, round, and reactive to light.   Cardiovascular:      Rate and Rhythm: Normal rate and regular rhythm.      Heart sounds: Normal heart sounds. No murmur heard.     Pulmonary:      Effort: Pulmonary effort is normal.      Breath sounds: Normal breath sounds.   Neurological:      Mental Status: She is alert and oriented to person, place, and time.      Cranial Nerves: No cranial nerve deficit.      Motor: No weakness.   Psychiatric:         Mood and Affect: Mood and affect normal.             Result Review :     The following data was reviewed by: KIAN Lyons on 09/30/2021:    Common labs    Common Labsle 10/6/20 10/6/20 3/26/21 3/26/21 9/27/21 9/27/21 9/27/21 9/27/21 9/27/21    1452 1452 0907 0907 0850 0850 0850 0850 0850   Glucose     142 (A)       Glucose  133 (A)  120 (A)        BUN  12  17 12       Creatinine  0.76  0.87 0.49 (A)       eGFR Non African Am     126       Sodium  141  137 139        Potassium  4.6  4.0 4.8       Chloride  104  101 103       Calcium  9.2  9.3 9.9       Albumin  4.4  4.0 4.80       Total Bilirubin  0.37  1.03 0.6       Alkaline Phosphatase  90  74 100       AST (SGOT)  23  23 24       ALT (SGPT)  27  31 27       WBC 6.67  9.20    6.52     Hemoglobin 15.1  15.6    15.8     Hematocrit 48.0 (A)  47.8 (A)    46.2     Platelets 206  211    234     Total Cholesterol      196      Total Cholesterol  167  164        Triglycerides  137  137  151 (A)      HDL Cholesterol  47  49  41      LDL Cholesterol   93  88  128 (A)      Hemoglobin A1C  7.5 (A)  7.0 (A)     7.10 (A)   Microalbumin, Urine        15.1    (A) Abnormal value       Comments are available for some flowsheets but are not being displayed.           Most Recent A1C    HGBA1C Most Recent 9/27/21   Hemoglobin A1C 7.10 (A)   (A) Abnormal value                           Assessment and Plan      Diagnoses and all orders for this visit:    1. Essential hypertension (Primary)  -     CBC & Differential; Future  -     Comprehensive Metabolic Panel; Future    2. Mixed hyperlipidemia  -     CBC & Differential; Future  -     Comprehensive Metabolic Panel; Future  -     Lipid Panel; Future    3. Gastroesophageal reflux disease without esophagitis    4. Generalized anxiety disorder    5. Seasonal allergic rhinitis due to pollen    6. Need for influenza vaccination  -     Fluzone High-Dose 65+yrs (6891-7788)    7. Postmenopausal  -     DEXA Bone Density Axial; Future    8. Type 2 diabetes mellitus without complication, without long-term current use of insulin (HCC)  -     TSH; Future  -     Hemoglobin A1c; Future  -     MicroAlbumin, Urine, Random - Urine, Clean Catch; Future            Follow Up     Return in about 6 months (around 3/30/2022) for Medicare Wellness .  Follow-up in 6 months for labs and appt. Call with any concerns or questions that you may have regarding your medications or history.    I have reviewed all medications and  at this time no medications changes need to be adjusted for must chronic conditions.  We will go to trintellix every other day and let me know in 2-3 weeks how your doing on the eyes.    Patient was given instructions and counseling regarding her condition or for health maintenance advice. Please see specific information pulled into the AVS if appropriate.   Natalie Mukherjee, APRN

## 2021-10-21 ENCOUNTER — TELEPHONE (OUTPATIENT)
Dept: FAMILY MEDICINE CLINIC | Facility: CLINIC | Age: 66
End: 2021-10-21

## 2021-11-02 ENCOUNTER — TELEPHONE (OUTPATIENT)
Dept: FAMILY MEDICINE CLINIC | Facility: CLINIC | Age: 66
End: 2021-11-02

## 2021-11-02 NOTE — TELEPHONE ENCOUNTER
Patient called with a follow up with her left eye. States it is still red and she has a headache ,cheekbone pain. States she hasn't taken trintellix since we told her to stop the medication

## 2021-11-05 ENCOUNTER — TELEPHONE (OUTPATIENT)
Dept: FAMILY MEDICINE CLINIC | Facility: CLINIC | Age: 66
End: 2021-11-05

## 2021-11-05 NOTE — TELEPHONE ENCOUNTER
Patient called back states she saw eye doctor yesterday and they told her it might be allergies and gave her eye gtts qid x 7 days. Does she need to go back on meds you prescribed her

## 2021-11-08 NOTE — TELEPHONE ENCOUNTER
Patient said she started back on trintellix on Friday 11/5/21. She feels much better. When she off trintellix had headache, just did not feel well.

## 2021-12-15 ENCOUNTER — TELEPHONE (OUTPATIENT)
Dept: FAMILY MEDICINE CLINIC | Facility: CLINIC | Age: 66
End: 2021-12-15

## 2021-12-15 DIAGNOSIS — Z12.31 SCREENING MAMMOGRAM FOR BREAST CANCER: Primary | ICD-10-CM

## 2021-12-15 NOTE — TELEPHONE ENCOUNTER
PATIENT CALLED AND STATES SHE GOT LETTER THAT SHE WILL DUE FOR MAMMOGRAM IN January AND NEEDS ORDER PUT IN SO IT CAN BE SCHEDULED.

## 2021-12-22 RX ORDER — PANTOPRAZOLE SODIUM 40 MG/1
TABLET, DELAYED RELEASE ORAL
Qty: 90 TABLET | Refills: 1 | Status: SHIPPED | OUTPATIENT
Start: 2021-12-22 | End: 2022-04-04 | Stop reason: SDUPTHER

## 2022-01-18 ENCOUNTER — OFFICE VISIT (OUTPATIENT)
Dept: OBSTETRICS AND GYNECOLOGY | Facility: CLINIC | Age: 67
End: 2022-01-18

## 2022-01-18 VITALS
WEIGHT: 142 LBS | HEIGHT: 61 IN | BODY MASS INDEX: 26.81 KG/M2 | HEART RATE: 80 BPM | SYSTOLIC BLOOD PRESSURE: 134 MMHG | DIASTOLIC BLOOD PRESSURE: 88 MMHG

## 2022-01-18 DIAGNOSIS — Z01.419 WELL WOMAN EXAM: Primary | ICD-10-CM

## 2022-01-18 PROCEDURE — 3015F CERV CANCER SCREEN DOCD: CPT | Performed by: NURSE PRACTITIONER

## 2022-01-18 PROCEDURE — G0101 CA SCREEN;PELVIC/BREAST EXAM: HCPCS | Performed by: NURSE PRACTITIONER

## 2022-01-18 RX ORDER — DIPHENHYDRAMINE HCL 25 MG
CAPSULE ORAL
COMMUNITY
End: 2022-02-17

## 2022-01-18 RX ORDER — NEOMYCIN SULFATE, POLYMYXIN B SULFATE AND DEXAMETHASONE 3.5; 10000; 1 MG/ML; [USP'U]/ML; MG/ML
SUSPENSION/ DROPS OPHTHALMIC
COMMUNITY
Start: 2021-11-04 | End: 2022-02-17

## 2022-01-18 RX ORDER — LORATADINE 10 MG/1
10 TABLET ORAL DAILY PRN
COMMUNITY

## 2022-01-18 RX ORDER — DOXYCYCLINE 100 MG/1
100 TABLET ORAL DAILY
COMMUNITY
Start: 2021-12-29 | End: 2022-02-17

## 2022-01-18 RX ORDER — MAGNESIUM HYDROXIDE/ALUMINUM HYDROXICE/SIMETHICONE 120; 1200; 1200 MG/30ML; MG/30ML; MG/30ML
10 SUSPENSION ORAL
COMMUNITY
End: 2022-02-17

## 2022-01-18 RX ORDER — METOPROLOL SUCCINATE 25 MG/1
TABLET, EXTENDED RELEASE ORAL
COMMUNITY
End: 2022-02-17

## 2022-01-18 RX ORDER — OMEGA-3S/DHA/EPA/FISH OIL/D3 300MG-1000
CAPSULE ORAL
COMMUNITY
End: 2022-10-04

## 2022-01-18 RX ORDER — DEXLANSOPRAZOLE 60 MG/1
CAPSULE, DELAYED RELEASE ORAL
Status: ON HOLD | COMMUNITY
End: 2022-02-18

## 2022-01-18 NOTE — PROGRESS NOTES
HPI:   66 y.o..Presents for well woman exam. Contraception or HRT: Post menopausal  Menses:   No vaginal bleeding.   Pain:  None  Last pap normal   Complaints: Pt has concerns she would like to discuss. Mild intermittent vaginal dryness   Patient reports that she is not currently experiencing any symptoms of urinary incontinence.      Past Medical History:   Diagnosis Date   • Abnormal CT of the chest 2019   • Allergic rhinitis    • Allergic rhinitis due to allergen 2018   • Anxiety 2017    IMPROVED SINCE STARTING TRINTELLIX   • Anxiety disorder 2018   • Diabetes mellitus (HCC) 2018   • Dizziness 2017    THE PATIENT REPORTS A TRANSIENT BOUT OF DIZZINESS THAT SHE ATTRIBUTES TO STRESS. HER LAST BOUT OF DIZZINESS WAS IN 2017.  I DID REVIEW HER CDS WHICH WERE UNREVEALING. I WILL REQUEST RECORES TO BE SENT TO REVIEW   • DM2 (diabetes mellitus, type 2) ([facility])    • Duodenal ulcer    • Essential hypertension 2018   • Facial pain 2017    THE PATIENT REPORTS A TRANSIENT BOUT OF FACIAL PAIN WITH RADIATION TOEARDS THE RIGHT EAR. SHE NOTES THAT THIS HAS RESOLVED. I DID PERSOANLLY REVIEW HER CT FACE WHICH WAS ESSENTIALLY UNREVELAING   • Fibrosis lung (HCC) 2018   • Generalized anxiety disorder 2019   • GERD (gastroesophageal reflux disease) 2018   • Heart murmur    • Hemorrhoids    • Hyperlipidemia 2018   • Mitral valve disorder    • Osteopenia 2019   • Other screening mammogram 2018   • PVC (premature ventricular contraction) 2021   • Renal calculus or stone    • Sarcoidosis    • Sinus trouble    • Streptococcal sore throat    • Type 2 diabetes mellitus with hyperglycemia, without long-term current use of insulin ([facility]) 2019   • Urinary tract infection       Past Surgical History:   Procedure Laterality Date   • CHOLECYSTECTOMY     • COLONOSCOPY      cologard    • CYSTOSCOPY     • LUNG BIOPSY     • PROSTATE LASER  "ABLATION/ENUCLEATION        Family History   Problem Relation Age of Onset   • Breast cancer Mother 80   • Heart disease Mother    • Kidney cancer Father 78        bladder   • Heart disease Daughter         PCP: does manage PMHx and preventative labs      PHYSICAL EXAM: Chaperone present /88   Pulse 80   Ht 154.9 cm (61\")   Wt 64.4 kg (142 lb)   BMI 26.83 kg/m²   General- NAD, alert and oriented, appropriate  Psych- Normal mood, good memory  Neck- No masses, no thyroid enlargement  Lymphatic- No palpable neck, axillary, or groin nodes  CV- Regular rhythm, no murmurs  Resp- CTA to bases, no wheezes  Abdomen- Soft, non distended, non tender, no masses  Breast left-  Bilaterally symmetrical, no masses, non tender, no nipple discharge  Breast right- Bilaterally symmetrical, no masses, non tender, no nipple discharge  External genitalia- Normal female, no lesions  Urethra/meatus- Normal, no masses, non tender, no prolapse  Bladder- Normal, no masses, non tender, no prolapse  Vagina- Normal, mild atrophy, no lesions, no discharge, no prolapse  Cvx- Normal, no lesions, no discharge, No cervical motion tenderness  Uterus- Normal size, shape & consistency.  Non tender, mobile, & no prolapse  Adnexa- No mass, non tender  Anus/Rectum/Perineum- Not performed  Ext- No edema, no cyanosis    Skin- No lesions, no rashes, no acanthosis nigricans      ASSESSMENT and PLAN:  WWE    Diagnoses and all orders for this visit:    1. Well woman exam (Primary)        Counseling:     HRT R/B/A/SE/E all options including non-FDA approved options reviewed    Preventative:   BREAST HEALTH- Monthly self breast exam importance and how to reviewed. MMG and/or MRI (prn) reviewed per society guidelines and her individual history. Screen: scheduled per pcp.  CERVICAL CANCER Screening- Reviewed current ASCCP guidelines for screening w and wo cotest HPV, age specific.  Screen: Not medically needed.  COLON CANCER Screening- Reviewed current " medical society guidelines and options.  Screen:  scheduled per pcp.  SEXUAL HEALTH: Pomegranate seed oil handout provided for use for vaginal lubrication.  Follow up PCP/Specialist PMHx and Labs  Myriad: Does not qualify.  s/p FLU vaccine this season      She understands the importance of having any ordered tests to be performed in a timely fashion.  The risks of not performing them include, but are not limited to, advanced cancer stages, bone loss from osteoporosis and/or subsequent increase in morbidity and/or mortality.  She is encouraged to review her results online and/or contact or office if she has questions.     Follow Up:  Return for as needed.      Nicole Mcmullen, APRN  01/18/2022

## 2022-01-21 ENCOUNTER — APPOINTMENT (OUTPATIENT)
Dept: BONE DENSITY | Facility: HOSPITAL | Age: 67
End: 2022-01-21

## 2022-01-25 ENCOUNTER — TELEPHONE (OUTPATIENT)
Dept: FAMILY MEDICINE CLINIC | Facility: CLINIC | Age: 67
End: 2022-01-25

## 2022-01-25 RX ORDER — VORTIOXETINE 10 MG/1
10 TABLET, FILM COATED ORAL DAILY
Qty: 30 TABLET | Refills: 5 | Status: SHIPPED | OUTPATIENT
Start: 2022-01-25 | End: 2022-03-09

## 2022-01-31 ENCOUNTER — TELEPHONE (OUTPATIENT)
Dept: FAMILY MEDICINE CLINIC | Facility: CLINIC | Age: 67
End: 2022-01-31

## 2022-02-07 ENCOUNTER — HOSPITAL ENCOUNTER (OUTPATIENT)
Dept: GENERAL RADIOLOGY | Facility: HOSPITAL | Age: 67
Discharge: HOME | End: 2022-02-07
Admitting: NURSE PRACTITIONER

## 2022-02-07 ENCOUNTER — OFFICE VISIT (OUTPATIENT)
Dept: FAMILY MEDICINE CLINIC | Facility: CLINIC | Age: 67
End: 2022-02-07

## 2022-02-07 VITALS
OXYGEN SATURATION: 97 % | HEIGHT: 61 IN | HEART RATE: 82 BPM | WEIGHT: 142.8 LBS | TEMPERATURE: 97.2 F | SYSTOLIC BLOOD PRESSURE: 130 MMHG | BODY MASS INDEX: 26.96 KG/M2 | DIASTOLIC BLOOD PRESSURE: 74 MMHG | RESPIRATION RATE: 20 BRPM

## 2022-02-07 DIAGNOSIS — J02.9 SORE THROAT: ICD-10-CM

## 2022-02-07 DIAGNOSIS — N30.00 ACUTE CYSTITIS WITHOUT HEMATURIA: Primary | ICD-10-CM

## 2022-02-07 DIAGNOSIS — N30.00 ACUTE CYSTITIS WITHOUT HEMATURIA: ICD-10-CM

## 2022-02-07 DIAGNOSIS — R10.9 ABDOMINAL PAIN IN FEMALE: ICD-10-CM

## 2022-02-07 DIAGNOSIS — R10.9 FLANK PAIN: ICD-10-CM

## 2022-02-07 LAB
BILIRUB BLD-MCNC: NEGATIVE MG/DL
CLARITY, POC: CLEAR
COLOR UR: YELLOW
EXPIRATION DATE: ABNORMAL
EXPIRATION DATE: NORMAL
GLUCOSE UR STRIP-MCNC: NEGATIVE MG/DL
INTERNAL CONTROL: NORMAL
KETONES UR QL: NEGATIVE
LEUKOCYTE EST, POC: ABNORMAL
Lab: ABNORMAL
Lab: NORMAL
NITRITE UR-MCNC: NEGATIVE MG/ML
PH UR: 5.5 [PH] (ref 5–8)
PROT UR STRIP-MCNC: NEGATIVE MG/DL
RBC # UR STRIP: ABNORMAL /UL
S PYO AG THROAT QL: NEGATIVE
SARS-COV-2 RNA PNL SPEC NAA+PROBE: NOT DETECTED
SP GR UR: 1 (ref 1–1.03)
UROBILINOGEN UR QL: NORMAL

## 2022-02-07 PROCEDURE — 81003 URINALYSIS AUTO W/O SCOPE: CPT | Performed by: NURSE PRACTITIONER

## 2022-02-07 PROCEDURE — U0004 COV-19 TEST NON-CDC HGH THRU: HCPCS | Performed by: NURSE PRACTITIONER

## 2022-02-07 PROCEDURE — 74018 RADEX ABDOMEN 1 VIEW: CPT

## 2022-02-07 PROCEDURE — 99213 OFFICE O/P EST LOW 20 MIN: CPT | Performed by: NURSE PRACTITIONER

## 2022-02-07 PROCEDURE — 87880 STREP A ASSAY W/OPTIC: CPT | Performed by: NURSE PRACTITIONER

## 2022-02-07 RX ORDER — NITROFURANTOIN 25; 75 MG/1; MG/1
100 CAPSULE ORAL 2 TIMES DAILY
Qty: 14 CAPSULE | Refills: 0 | Status: SHIPPED | OUTPATIENT
Start: 2022-02-07 | End: 2022-02-14

## 2022-02-07 NOTE — PROGRESS NOTES
Chief Complaint  Back Pain (lower back and also intermittent right side pain)    Subjective          Loretta Ren presents to [facility]  History of Present Illness  She is having pain in the lower back.  She does have some pain on the right side that comes and goes at times.  She does have some flank pain on the right side.  She has also had some sore throat and it is red all the time.  She doesn't go anywhere except on Sat she went into town.  She did have burning with pain after her side started to hurt.      Allergies  Aspirin and Ceftriaxone    Social History     Tobacco Use   • Smoking status: Never Smoker   • Smokeless tobacco: Never Used   Vaping Use   • Vaping Use: Never used   Substance Use Topics   • Alcohol use: Never   • Drug use: Never       Family History   Problem Relation Age of Onset   • Breast cancer Mother 80   • Heart disease Mother    • Kidney cancer Father 78        bladder   • Heart disease Daughter         Health Maintenance Due   Topic Date Due   • COLORECTAL CANCER SCREENING  Never done   • COVID-19 Vaccine (1) Never done   • Pneumococcal Vaccine 65+ (1 of 2 - PPSV23) [birth year]/1959   • TDAP/TD VACCINES (1 - Tdap) Never done   • ZOSTER VACCINE (2 of 2) 02/05/2015   • ANNUAL WELLNESS VISIT  Never done   • DIABETIC FOOT EXAM  Never done   • DXA SCAN  12/16/2021   • DIABETIC EYE EXAM  12/28/2021        Immunization History   Administered Date(s) Administered   • Fluzone High-Dose 65+yrs 09/30/2021   • Influenza, Unspecified 10/06/2020   • PEDS-Pneumococcal Conjugate (PCV7) 10/31/2014   • Shingrix 12/11/2014       Review of Systems   Constitutional: Negative for fatigue.   Respiratory: Negative for cough and shortness of breath.    Cardiovascular: Negative for chest pain and leg swelling.   Gastrointestinal: Positive for abdominal pain and nausea. Negative for constipation, diarrhea and vomiting.   Genitourinary: Positive for flank pain.   Skin: Negative  "for rash.   Psychiatric/Behavioral: Negative for hallucinations and suicidal ideas.        Objective       Vitals:    02/07/22 1138   BP: 130/74   BP Location: Left arm   Patient Position: Sitting   Cuff Size: Adult   Pulse: 82   Resp: 20   Temp: 97.2 °F (36.2 °C)   TempSrc: Temporal   SpO2: 97%   Weight: 64.8 kg (142 lb 12.8 oz)   Height: 154.9 cm (61\")       Body mass index is 26.98 kg/m².         Physical Exam  Vitals reviewed.   Constitutional:       Appearance: Normal appearance. She is well-developed.   Cardiovascular:      Rate and Rhythm: Normal rate and regular rhythm.      Heart sounds: Normal heart sounds. No murmur heard.      Pulmonary:      Effort: Pulmonary effort is normal.      Breath sounds: Normal breath sounds.   Neurological:      Mental Status: She is alert and oriented to person, place, and time.      Cranial Nerves: No cranial nerve deficit.      Motor: No weakness.   Psychiatric:         Mood and Affect: Mood and affect normal.     CVA tenderness on the right side.        Result Review :     The following data was reviewed by: KIAN Lyons on 02/07/2022:    Common labs    Common Labsle 3/26/21 3/26/21 9/27/21 9/27/21 9/27/21 9/27/21 9/27/21    0907 0907 0850 0850 0850 0850 0850   Glucose  120 (A) 142 (A)       BUN  17 12       Creatinine  0.87 0.49 (A)       eGFR Non African Am   126       Sodium  137 139       Potassium  4.0 4.8       Chloride  101 103       Calcium  9.3 9.9       Albumin  4.0 4.80       Total Bilirubin  1.03 0.6       Alkaline Phosphatase  74 100       AST (SGOT)  23 24       ALT (SGPT)  31 27       WBC 9.20    6.52     Hemoglobin 15.6    15.8     Hematocrit 47.8 (A)    46.2     Platelets 211    234     Total Cholesterol    196      Total Cholesterol  164        Triglycerides  137  151 (A)      HDL Cholesterol  49  41      LDL Cholesterol   88  128 (A)      Hemoglobin A1C  7.0 (A)     7.10 (A)   Microalbumin, Urine      15.1    (A) Abnormal value     "   Comments are available for some flowsheets but are not being displayed.           UA    Urinalysis 2/7/22   Ketones, UA Negative   Leukocytes, UA Trace (A)   (A) Abnormal value                           Assessment and Plan      Diagnoses and all orders for this visit:    1. Acute cystitis without hematuria (Primary)  -     nitrofurantoin, macrocrystal-monohydrate, (Macrobid) 100 MG capsule; Take 1 capsule by mouth 2 (Two) Times a Day for 7 days.  Dispense: 14 capsule; Refill: 0  -     XR Abdomen KUB; Future    2. Abdominal pain in female  -     POCT urinalysis dipstick, automated  -     XR Abdomen KUB; Future    3. Flank pain  -     XR Abdomen KUB; Future    4. Sore throat  -     COVID-19,APTIMA PANTHER([place]),[facility], NP/OP SWAB IN UTM/VTM/[facility] TRANSPORT MEDIA,24 HR TAT - Swab, Nasopharynx  -     POCT rapid strep A            Follow Up     Return if symptoms worsen or fail to improve.  We will covid test.  We will do the UTI abx.  We will do xray and check for stones.  We may have to do CT if not better--colitis, enteritis.  Patient was given instructions and counseling regarding her condition or for health maintenance advice. Please see specific information pulled into the AVS if appropriate.         Natalie Mukherjee, APRN  02/07/2022

## 2022-02-17 ENCOUNTER — APPOINTMENT (OUTPATIENT)
Dept: CT IMAGING | Facility: HOSPITAL | Age: 67
End: 2022-02-17

## 2022-02-17 ENCOUNTER — HOSPITAL ENCOUNTER (EMERGENCY)
Facility: HOSPITAL | Age: 67
Discharge: HOME | End: 2022-02-17
Attending: EMERGENCY MEDICINE | Admitting: EMERGENCY MEDICINE

## 2022-02-17 VITALS
RESPIRATION RATE: 18 BRPM | HEIGHT: 61 IN | OXYGEN SATURATION: 100 % | DIASTOLIC BLOOD PRESSURE: 81 MMHG | SYSTOLIC BLOOD PRESSURE: 119 MMHG | HEART RATE: 92 BPM | WEIGHT: 140.43 LBS | TEMPERATURE: 97.6 F | BODY MASS INDEX: 26.51 KG/M2

## 2022-02-17 DIAGNOSIS — R10.9 RIGHT FLANK PAIN: ICD-10-CM

## 2022-02-17 DIAGNOSIS — R93.5 ABNORMAL CT OF THE ABDOMEN: ICD-10-CM

## 2022-02-17 DIAGNOSIS — N20.1 RIGHT URETERAL STONE: Primary | ICD-10-CM

## 2022-02-17 LAB
ALBUMIN SERPL-MCNC: 4.6 G/DL (ref 3.5–5.2)
ALBUMIN/GLOB SERPL: 1.5 G/DL
ALP SERPL-CCNC: 111 U/L (ref 39–117)
ALT SERPL W P-5'-P-CCNC: 30 U/L (ref 1–33)
ANION GAP SERPL CALCULATED.3IONS-SCNC: 13.7 MMOL/L (ref 5–15)
AST SERPL-CCNC: 19 U/L (ref 1–32)
BACTERIA UR QL AUTO: ABNORMAL /HPF
BASOPHILS # BLD AUTO: 0.02 10*3/MM3 (ref 0–0.2)
BASOPHILS NFR BLD AUTO: 0.2 % (ref 0–1.5)
BILIRUB SERPL-MCNC: 0.4 MG/DL (ref 0–1.2)
BILIRUB UR QL STRIP: NEGATIVE
BUN SERPL-MCNC: 9 MG/DL (ref 8–23)
BUN/CREAT SERPL: 13.2 (ref 7–25)
CALCIUM SPEC-SCNC: 9.7 MG/DL (ref 8.6–10.5)
CHLORIDE SERPL-SCNC: 99 MMOL/L (ref 98–107)
CLARITY UR: CLEAR
CO2 SERPL-SCNC: 26.3 MMOL/L (ref 22–29)
COLOR UR: YELLOW
CREAT SERPL-MCNC: 0.68 MG/DL (ref 0.57–1)
DEPRECATED RDW RBC AUTO: 40.7 FL (ref 37–54)
EOSINOPHIL # BLD AUTO: 0.2 10*3/MM3 (ref 0–0.4)
EOSINOPHIL NFR BLD AUTO: 1.8 % (ref 0.3–6.2)
ERYTHROCYTE [DISTWIDTH] IN BLOOD BY AUTOMATED COUNT: 12.3 % (ref 12.3–15.4)
GFR SERPL CREATININE-BSD FRML MDRD: 86 ML/MIN/1.73
GLOBULIN UR ELPH-MCNC: 3 GM/DL
GLUCOSE SERPL-MCNC: 127 MG/DL (ref 65–99)
GLUCOSE UR STRIP-MCNC: NEGATIVE MG/DL
HCT VFR BLD AUTO: 45.5 % (ref 34–46.6)
HGB BLD-MCNC: 15.5 G/DL (ref 12–15.9)
HGB UR QL STRIP.AUTO: ABNORMAL
HOLD SPECIMEN: NORMAL
HOLD SPECIMEN: NORMAL
HYALINE CASTS UR QL AUTO: ABNORMAL /LPF
IMM GRANULOCYTES # BLD AUTO: 0.04 10*3/MM3 (ref 0–0.05)
IMM GRANULOCYTES NFR BLD AUTO: 0.4 % (ref 0–0.5)
KETONES UR QL STRIP: NEGATIVE
LEUKOCYTE ESTERASE UR QL STRIP.AUTO: ABNORMAL
LIPASE SERPL-CCNC: 27 U/L (ref 13–60)
LYMPHOCYTES # BLD AUTO: 1.6 10*3/MM3 (ref 0.7–3.1)
LYMPHOCYTES NFR BLD AUTO: 14.1 % (ref 19.6–45.3)
MCH RBC QN AUTO: 30.5 PG (ref 26.6–33)
MCHC RBC AUTO-ENTMCNC: 34.1 G/DL (ref 31.5–35.7)
MCV RBC AUTO: 89.6 FL (ref 79–97)
MONOCYTES # BLD AUTO: 0.66 10*3/MM3 (ref 0.1–0.9)
MONOCYTES NFR BLD AUTO: 5.8 % (ref 5–12)
NEUTROPHILS NFR BLD AUTO: 77.7 % (ref 42.7–76)
NEUTROPHILS NFR BLD AUTO: 8.84 10*3/MM3 (ref 1.7–7)
NITRITE UR QL STRIP: NEGATIVE
NRBC BLD AUTO-RTO: 0 /100 WBC (ref 0–0.2)
PH UR STRIP.AUTO: 8 [PH] (ref 5–8)
PLATELET # BLD AUTO: 217 10*3/MM3 (ref 140–450)
PMV BLD AUTO: 10.4 FL (ref 6–12)
POTASSIUM SERPL-SCNC: 4.3 MMOL/L (ref 3.5–5.2)
PROT SERPL-MCNC: 7.6 G/DL (ref 6–8.5)
PROT UR QL STRIP: ABNORMAL
RBC # BLD AUTO: 5.08 10*6/MM3 (ref 3.77–5.28)
RBC # UR STRIP: ABNORMAL /HPF
REF LAB TEST METHOD: ABNORMAL
SODIUM SERPL-SCNC: 139 MMOL/L (ref 136–145)
SP GR UR STRIP: 1.01 (ref 1–1.03)
SQUAMOUS #/AREA URNS HPF: ABNORMAL /HPF
UROBILINOGEN UR QL STRIP: ABNORMAL
WBC # UR STRIP: ABNORMAL /HPF
WBC NRBC COR # BLD: 11.36 10*3/MM3 (ref 3.4–10.8)
WHOLE BLOOD HOLD SPECIMEN: NORMAL
WHOLE BLOOD HOLD SPECIMEN: NORMAL

## 2022-02-17 PROCEDURE — 74176 CT ABD & PELVIS W/O CONTRAST: CPT

## 2022-02-17 PROCEDURE — 87077 CULTURE AEROBIC IDENTIFY: CPT | Performed by: NURSE PRACTITIONER

## 2022-02-17 PROCEDURE — 87186 SC STD MICRODIL/AGAR DIL: CPT | Performed by: NURSE PRACTITIONER

## 2022-02-17 PROCEDURE — 81001 URINALYSIS AUTO W/SCOPE: CPT

## 2022-02-17 PROCEDURE — 63710000001 ONDANSETRON ODT 4 MG TABLET DISPERSIBLE: Performed by: NURSE PRACTITIONER

## 2022-02-17 PROCEDURE — 85025 COMPLETE CBC W/AUTO DIFF WBC: CPT

## 2022-02-17 PROCEDURE — 99283 EMERGENCY DEPT VISIT LOW MDM: CPT

## 2022-02-17 PROCEDURE — 25010000002 MORPHINE PER 10 MG: Performed by: EMERGENCY MEDICINE

## 2022-02-17 PROCEDURE — 87086 URINE CULTURE/COLONY COUNT: CPT | Performed by: NURSE PRACTITIONER

## 2022-02-17 PROCEDURE — 83690 ASSAY OF LIPASE: CPT

## 2022-02-17 PROCEDURE — 80053 COMPREHEN METABOLIC PANEL: CPT

## 2022-02-17 PROCEDURE — 36415 COLL VENOUS BLD VENIPUNCTURE: CPT

## 2022-02-17 PROCEDURE — 96372 THER/PROPH/DIAG INJ SC/IM: CPT

## 2022-02-17 RX ORDER — ONDANSETRON 4 MG/1
4 TABLET, ORALLY DISINTEGRATING ORAL EVERY 8 HOURS PRN
Qty: 15 TABLET | Refills: 0 | Status: SHIPPED | OUTPATIENT
Start: 2022-02-17 | End: 2022-04-04

## 2022-02-17 RX ORDER — HYDROCODONE BITARTRATE AND ACETAMINOPHEN 5; 325 MG/1; MG/1
1 TABLET ORAL EVERY 6 HOURS PRN
Status: DISCONTINUED | OUTPATIENT
Start: 2022-02-17 | End: 2022-02-17 | Stop reason: HOSPADM

## 2022-02-17 RX ORDER — ONDANSETRON 2 MG/ML
4 INJECTION INTRAMUSCULAR; INTRAVENOUS ONCE
Status: DISCONTINUED | OUTPATIENT
Start: 2022-02-17 | End: 2022-02-17

## 2022-02-17 RX ORDER — SODIUM CHLORIDE 0.9 % (FLUSH) 0.9 %
10 SYRINGE (ML) INJECTION AS NEEDED
Status: DISCONTINUED | OUTPATIENT
Start: 2022-02-17 | End: 2022-02-17 | Stop reason: HOSPADM

## 2022-02-17 RX ORDER — MORPHINE SULFATE 2 MG/ML
2 INJECTION, SOLUTION INTRAMUSCULAR; INTRAVENOUS ONCE
Status: DISCONTINUED | OUTPATIENT
Start: 2022-02-17 | End: 2022-02-17

## 2022-02-17 RX ORDER — ONDANSETRON 4 MG/1
4 TABLET, ORALLY DISINTEGRATING ORAL ONCE
Status: COMPLETED | OUTPATIENT
Start: 2022-02-17 | End: 2022-02-17

## 2022-02-17 RX ORDER — HYDROCODONE BITARTRATE AND ACETAMINOPHEN 7.5; 325 MG/1; MG/1
1 TABLET ORAL EVERY 6 HOURS PRN
Qty: 12 TABLET | Refills: 0 | Status: SHIPPED | OUTPATIENT
Start: 2022-02-17 | End: 2022-04-04

## 2022-02-17 RX ADMIN — HYDROCODONE BITARTRATE AND ACETAMINOPHEN 1 TABLET: 5; 325 TABLET ORAL at 20:34

## 2022-02-17 RX ADMIN — ONDANSETRON 4 MG: 4 TABLET, ORALLY DISINTEGRATING ORAL at 19:49

## 2022-02-17 RX ADMIN — MORPHINE SULFATE 4 MG: 4 INJECTION INTRAVENOUS at 19:48

## 2022-02-17 NOTE — ED PROVIDER NOTES
Emergency Department Encounter    Room number: 54/54  Date seen: 2/17/2022  PCP: Natalie Mukherjee APRN      History provided by:  Patient   used: No          HPI:  Chief complaint: right flank pain    Context: Loretta Ren is a 66 y.o. female with a history of reflux, anxiety who presents to the ED with right flank pain which started February 7.  Patient has been treated for urinary tract infection is now on her second course of antibiotics.  Patient continues to have dysuria and nausea.  She denies fever, cough, shortness of breath, chest pain, hematuria, bloody stools, constipation, diarrhea, abdominal swelling, heavy lifting or straining, trauma/fall/injury, or other complaint.  Patient denies smoking or alcohol use.      Location: right flank pain  Quality: sharp  Severity: moderate  Radiation: around to front of abdomen  Duration: ongoing  Onset: one week  Modifying factors: dysuria, nausea        Old records reviewed:  Patient seen at PCP 2/7/22 for acute cystitis with hematuria and given macrobid. She had a KUB in office that suggested renal stone.  . Her pain has not improved and she is now on bactrim which she started yesterday.         XR Abdomen KUB    Result Date: 2/7/2022  Narrative: PROCEDURE: XR ABDOMEN KUB  COMPARISON: [facility], CT, ABD PEL W/O CONTRAST, 1/29/2016, 4:34.  [facility], CR, ABDOMEN SINGLE AP, 2/19/2011, 6:31.  INDICATIONS: right flank pain  FINDINGS:  There is mild dextrocurvature in the upper lumbar spine.  Mild degenerative changes are seen.  No bowel obstruction.  Moderate stool throughout the colon.  Bone there is a 4 millimeter calcification in the right abdomen which has a curvilinear shape.      Impression:   1. There is a 4 millimeter curvilinear calcification in the right abdomen.  This could reflect a vascular calcification.  A nephrolith or ureteral stone are in the differential.  Consider follow-up CT if  persisting flank pain.  Otherwise moderate stool in the colon but no other acute finding     WILBERT PUENTE MD       Electronically Signed and Approved By: WILBERT PUENTE MD on 2/07/2022 at 13:56                 Triage Vitals:  ED Triage Vitals [02/17/22 1453]   Temp Heart Rate Resp BP SpO2   97.6 °F (36.4 °C) 92 18 119/81 100 %      Temp src Heart Rate Source Patient Position BP Location FiO2 (%)   Oral -- Sitting Left arm --         Review of Systems   Constitutional: Negative for chills and fever.   HENT: Negative for rhinorrhea and sore throat.    Respiratory: Negative for cough and shortness of breath.    Cardiovascular: Negative for chest pain and palpitations.   Gastrointestinal: Positive for nausea. Negative for abdominal pain, diarrhea and vomiting.   Genitourinary: Positive for dysuria. Negative for pelvic pain and vaginal bleeding.   Musculoskeletal: Negative for back pain and myalgias.   Skin: Negative for rash and wound.   Neurological: Negative for dizziness and headaches.   Psychiatric/Behavioral: Negative for sleep disturbance and suicidal ideas.         Physical Exam  Constitutional:       Appearance: Normal appearance.      Comments: Well appearing   HENT:      Head: Normocephalic and atraumatic.      Nose: Nose normal.   Eyes:      Extraocular Movements: Extraocular movements intact.      Pupils: Pupils are equal, round, and reactive to light.   Cardiovascular:      Rate and Rhythm: Normal rate and regular rhythm.   Pulmonary:      Effort: Pulmonary effort is normal.      Breath sounds: Normal breath sounds.   Abdominal:      General: Bowel sounds are normal. There is no distension.      Palpations: Abdomen is soft.      Tenderness: There is no abdominal tenderness.      Comments: Right flank pain.  No CVA tenderness bilaterally.  No rashes or wounds noted.   Musculoskeletal:         General: Normal range of motion.      Cervical back: Normal range of motion.      Right lower leg: No edema.       Left lower leg: No edema.   Skin:     General: Skin is warm.   Neurological:      General: No focal deficit present.      Mental Status: She is alert and oriented to person, place, and time.   Psychiatric:         Mood and Affect: Mood normal.         Behavior: Behavior normal.         Thought Content: Thought content normal.         Judgment: Judgment normal.             Allergies:  Aspirin and Ceftriaxone  Patient's allergies reviewed    Past Medical History:  Past Medical History:   Diagnosis Date   • Abnormal CT of the chest 04/29/2019   • Allergic rhinitis    • Allergic rhinitis due to allergen 11/06/2018   • Anxiety 11/06/2017    IMPROVED SINCE STARTING TRINTELLIX   • Anxiety disorder 11/06/2018   • Diabetes mellitus (HCC) 11/06/2018   • Dizziness 11/06/2017    THE PATIENT REPORTS A TRANSIENT BOUT OF DIZZINESS THAT SHE ATTRIBUTES TO STRESS. HER LAST BOUT OF DIZZINESS WAS IN JULY 2017.  I DID REVIEW HER CDS WHICH WERE UNREVEALING. I WILL REQUEST RECORES TO BE SENT TO REVIEW   • DM2 (diabetes mellitus, type 2) ([facility])    • Duodenal ulcer    • Essential hypertension 11/06/2018   • Facial pain 11/06/2017    THE PATIENT REPORTS A TRANSIENT BOUT OF FACIAL PAIN WITH RADIATION TOEARDS THE RIGHT EAR. SHE NOTES THAT THIS HAS RESOLVED. I DID PERSOANLLY REVIEW HER CT FACE WHICH WAS ESSENTIALLY UNREVELAING   • Fibrosis lung ([facility]) 11/06/2018   • Generalized anxiety disorder 04/29/2019   • GERD (gastroesophageal reflux disease) 11/06/2018   • Heart murmur    • Hemorrhoids    • Hyperlipidemia 11/06/2018   • Mitral valve disorder    • Osteopenia 04/29/2019   • Other screening mammogram 12/2018   • PVC (premature ventricular contraction) 8/4/2021   • Renal calculus or stone    • Sarcoidosis    • Sinus trouble    • Streptococcal sore throat    • Type 2 diabetes mellitus with hyperglycemia, without long-term current use of insulin ([facility]) 04/29/2019   • Urinary tract infection          Past Surgical History:  Past Surgical  History:   Procedure Laterality Date   • CHOLECYSTECTOMY     • COLONOSCOPY  2008    cologard 2018   • CYSTOSCOPY     • LUNG BIOPSY  1992   • PROSTATE LASER ABLATION/ENUCLEATION         Procedures    Labs Reviewed   COMPREHENSIVE METABOLIC PANEL - Abnormal; Notable for the following components:       Result Value    Glucose 127 (*)     All other components within normal limits    Narrative:     GFR Normal >60  Chronic Kidney Disease <60  Kidney Failure <15     URINALYSIS W/ MICROSCOPIC IF INDICATED (NO CULTURE) - Abnormal; Notable for the following components:    Blood, UA Moderate (2+) (*)     Protein,  mg/dL (2+) (*)     Leuk Esterase, UA Moderate (2+) (*)     All other components within normal limits   CBC WITH AUTO DIFFERENTIAL - Abnormal; Notable for the following components:    WBC 11.36 (*)     Neutrophil % 77.7 (*)     Lymphocyte % 14.1 (*)     Neutrophils, Absolute 8.84 (*)     All other components within normal limits   URINALYSIS, MICROSCOPIC ONLY - Abnormal; Notable for the following components:    RBC, UA 13-20 (*)     WBC, UA 21-30 (*)     Bacteria, UA Trace (*)     All other components within normal limits   LIPASE - Normal   URINE CULTURE   RAINBOW DRAW    Narrative:     The following orders were created for panel order [place] Draw.  Procedure                               Abnormality         Status                     ---------                               -----------         ------                     Green Top (Gel)[[id number]]                                  Final result               Lavender Top[[id number]]                                     Final result               Gold Top - SST[[id number]]                                   Final result               Light Blue Top[[id number]]                                   Final result                 Please view results for these tests on the individual orders.   CBC AND DIFFERENTIAL    Narrative:     The following orders were created for panel  order CBC & Differential.  Procedure                               Abnormality         Status                     ---------                               -----------         ------                     CBC Auto Differential[[id number]]        Abnormal            Final result                 Please view results for these tests on the individual orders.   GREEN TOP   LAVENDER TOP   GOLD TOP - SST   LIGHT BLUE TOP       Medications   sodium chloride 0.9 % flush 10 mL (has no administration in time range)   ondansetron ODT (ZOFRAN-ODT) disintegrating tablet 4 mg (4 mg Oral Given 2/17/22 1949)   morphine injection 4 mg (4 mg Intramuscular Given 2/17/22 1948)     PROCEDURE:  CT ABDOMEN PELVIS WO CONTRAST     COMPARISON: [facility], CT, ABD PEL W/O CONTRAST, 1/29/2016, 4:34.  [facility]   DIAGNOSTICS, CR, XR ABDOMEN KUB, 2/07/2022, 13:16.  [facility], CT, CHEST W/   CONTRAST, 4/25/2018, 9:44.     INDICATIONS:  right sided flank pain, nausea, hx kidney stones     TECHNIQUE:    CT images were created without intravenous contrast.       PROTOCOL:     Standard imaging protocol performed                 RADIATION:      DLP: 369.5 mGy*cm               Automated exposure control was utilized to minimize radiation dose.      FINDINGS:          Mild scarring at the lung bases is stable.     The liver is of normal size.  The gallbladder is surgically absent.  No pancreatic or adrenal mass   is evident.  The spleen is of normal size.     Tiny 1 mm nonobstructing stones are seen in the upper and midpole collecting system of the right   kidney.  Moderate right hydronephrosis is evident.  A 6 mm stone is seen in the proximal right   ureter.     The left kidney and left ureter have an unremarkable appearance.  The urinary bladder is not   abnormally distended.     The uterus measures 4 cm in transverse dimension.  No pelvic mass is evident.     Bowel loops are not abnormally dilated.     Mild  degenerative spurring is seen in the lower thoracic and lumbar spine.     CONTINUED ON NEXT PAGE...           IMPRESSION:                 CT scan of the abdomen and pelvis without contrast demonstrating 6 mm stone in the proximal right   ureter with moderate right hydronephrosis.     Post cholecystectomy.            AGNIESZKA DUARTE MD         Electronically Signed and Approved By: AGNIESZKA DUARTE MD on 2/17/2022 at 20:08                  Progress Notes:  2030 Patient rechecked I have personally reviewed all radiology findings, incidental and otherwise, with the patient and made patient aware of what needs to be followed up with PCP.    I have discussed with the patient the emergency department work-up including all test results, the differential diagnosis, the diagnosis given in the emergency department, and the absolute need for follow-up with the primary care physician.  I have discussed all prescriptions and treatments.  I have discussed the possible worsening of their condition, and also discussed criteria for return to the emergency department which includes but is not limited to worsening condition or lack of improvement of the current condition.  I have informed them that a normal work-up evaluation in the emergency department and/or discharge from the emergency department, does not signify that no disease process is present, but simply that there is no emergent indication for admission.  All questions were answered at this time.  I informed them that significant pathology may still be present, but they may need further work-up, and that this further investigation should be undertaken by the primary care physician. She voiced his/her understanding.  Patient is agreeable to plan for discharge.    Discharge instructions include:   Follow up with your PCP, Higinio LANGSTON for reevaluation recheck your blood pressure.    Call Dr. Rosales office in the a.m. and he will work you into his schedule.    Take Lortab as  "directed for pain.  This is a narcotic pain reliever.  Narcotics can be addictive.  This contains Tylenol do not take Tylenol taking this medication.  Do not share with friends or family.    Continue your Bactrim    Return the emergency department for fever, vomiting, unable to urinate, new change or worsening symptoms.          Vitals:    02/17/22 1453   BP: 119/81   BP Location: Left arm   Patient Position: Sitting   Pulse: 92   Resp: 18   Temp: 97.6 °F (36.4 °C)   TempSrc: Oral   SpO2: 100%   Weight: 63.7 kg (140 lb 6.9 oz)   Height: 154.9 cm (61\")           Final diagnoses:   Right ureteral stone   Right flank pain   Abnormal CT of the abdomen       Prescriptions:        Medication List      START taking these medications    HYDROcodone-acetaminophen 7.5-325 MG per tablet  Commonly known as: NORCO  Take 1 tablet by mouth Every 6 (Six) Hours As Needed for Moderate Pain .     ondansetron ODT 4 MG disintegrating tablet  Commonly known as: ZOFRAN-ODT  Place 1 tablet on the tongue Every 8 (Eight) Hours As Needed for Nausea or Vomiting.        CONTINUE taking these medications    B-12 100-5000 MCG sublingual tablet     BACTRIM PO     Calcium 500-100 MG-UNIT chewable tablet     carvedilol 3.125 MG tablet  Commonly known as: COREG  Take 1 tablet by mouth 2 (Two) Times a Day.     cholecalciferol 10 MCG (400 UNIT) tablet  Commonly known as: VITAMIN D3     Dexilant 60 MG capsule  Generic drug: dexlansoprazole     fluticasone 50 MCG/ACT nasal spray  Commonly known as: FLONASE     loratadine 10 MG tablet  Commonly known as: CLARITIN     MegaRed Omega-3 Krill Oil 350 MG capsule     pantoprazole 40 MG EC tablet  Commonly known as: PROTONIX  TAKE ONE TABLET BY MOUTH EVERY DAY     Trintellix 10 MG tablet  Generic drug: Vortioxetine HBr  Take 10 mg by mouth Daily.           Where to Get Your Medications      These medications were sent to [facility] - [place], [place] - [address] - [phone] [facility] [phone] " FX  [address] [facility] [place] [address]    Phone: [phone]   · HYDROcodone-acetaminophen 7.5-325 MG per tablet  · ondansetron ODT 4 MG disintegrating tablet               Consults:   2028 Spoke with Dr. Rosales, urology regarding history, workup, findings, and treatments given in the ED. Discussed concerns.  He is aware of right ureteral stone as well as moderate hydronephrosis.  Is where the patient had been on Macrobid a week ago and then just started Bactrim yesterday.  He is aware the patient is not febrile in the ER.  We discussed stable vital signs.  He states to have the patient call the office first thing in the morning and he will work her in.  He states the urinary tract appears to be just from the stone and is not significantly infected.    MDM  Number of Diagnoses or Management Options     Amount and/or Complexity of Data Reviewed  Clinical lab tests: ordered  Tests in the radiology section of CPT®: ordered  Review and summarize past medical records: yes  Independent visualization of images, tracings, or specimens: yes    Risk of Complications, Morbidity, and/or Mortality  Presenting problems: moderate  Diagnostic procedures: moderate  Management options: moderate    Patient Progress  Patient progress: improved      (N20.1) Right ureteral stone - Plan: HYDROcodone-acetaminophen (NORCO) 7.5-325 MG per tablet    (R10.9) Right flank pain    (R93.5) Abnormal CT of the abdomen      Reviewing your test results and medical records in your chart is not a substitution for discussing these with your health care provider.  Please contact your primary care provider to discuss any questions or concerns you may have regarding these test results.      Part of this note may be an electronic transcription/translation of spoken language to printed text using the Dragon Dictation System.  The electronic translation of spoken language may permit erroneous or at times nonsensical words or phrases to be inadvertently  transcribed.  Although I have reviewed the note for such errors some may still exist.             Moni Berman, APRN  02/17/22 2030

## 2022-02-18 ENCOUNTER — ANESTHESIA (OUTPATIENT)
Dept: PERIOP | Facility: HOSPITAL | Age: 67
End: 2022-02-18

## 2022-02-18 ENCOUNTER — HOSPITAL ENCOUNTER (OUTPATIENT)
Facility: HOSPITAL | Age: 67
Discharge: HOME | End: 2022-02-18
Attending: UROLOGY | Admitting: UROLOGY

## 2022-02-18 ENCOUNTER — PREP FOR SURGERY (OUTPATIENT)
Dept: OTHER | Facility: HOSPITAL | Age: 67
End: 2022-02-18

## 2022-02-18 ENCOUNTER — ANESTHESIA EVENT (OUTPATIENT)
Dept: PERIOP | Facility: HOSPITAL | Age: 67
End: 2022-02-18

## 2022-02-18 ENCOUNTER — OFFICE VISIT (OUTPATIENT)
Dept: UROLOGY | Facility: CLINIC | Age: 67
End: 2022-02-18

## 2022-02-18 ENCOUNTER — APPOINTMENT (OUTPATIENT)
Dept: GENERAL RADIOLOGY | Facility: HOSPITAL | Age: 67
End: 2022-02-18

## 2022-02-18 VITALS — HEIGHT: 61 IN | WEIGHT: 137.6 LBS | RESPIRATION RATE: 16 BRPM | BODY MASS INDEX: 25.98 KG/M2

## 2022-02-18 VITALS
HEIGHT: 61 IN | RESPIRATION RATE: 17 BRPM | HEART RATE: 80 BPM | OXYGEN SATURATION: 99 % | TEMPERATURE: 98.1 F | SYSTOLIC BLOOD PRESSURE: 107 MMHG | WEIGHT: 138.01 LBS | DIASTOLIC BLOOD PRESSURE: 71 MMHG | BODY MASS INDEX: 26.06 KG/M2

## 2022-02-18 DIAGNOSIS — N20.0 NEPHROLITHIASIS: Primary | ICD-10-CM

## 2022-02-18 DIAGNOSIS — Z01.818 PRE-PROCEDURAL EXAMINATION: ICD-10-CM

## 2022-02-18 DIAGNOSIS — Z01.810 PREPROCEDURAL CARDIOVASCULAR EXAMINATION: ICD-10-CM

## 2022-02-18 DIAGNOSIS — N20.1 URETERAL STONE: Primary | ICD-10-CM

## 2022-02-18 DIAGNOSIS — N20.1 URETERAL STONE: ICD-10-CM

## 2022-02-18 PROCEDURE — A9270 NON-COVERED ITEM OR SERVICE: HCPCS | Performed by: ANESTHESIOLOGY

## 2022-02-18 PROCEDURE — 99203 OFFICE O/P NEW LOW 30 MIN: CPT | Performed by: UROLOGY

## 2022-02-18 PROCEDURE — 74018 RADEX ABDOMEN 1 VIEW: CPT

## 2022-02-18 PROCEDURE — 76000 FLUOROSCOPY <1 HR PHYS/QHP: CPT

## 2022-02-18 PROCEDURE — 63710000001 ACETAMINOPHEN 500 MG TABLET: Performed by: ANESTHESIOLOGY

## 2022-02-18 PROCEDURE — 63710000001 SCOPOLAMINE 1 MG/3DAYS PATCH 72 HOUR: Performed by: ANESTHESIOLOGY

## 2022-02-18 PROCEDURE — 25010000002 LEVOFLOXACIN PER 250 MG: Performed by: UROLOGY

## 2022-02-18 PROCEDURE — 25010000002 PROPOFOL 10 MG/ML EMULSION: Performed by: NURSE ANESTHETIST, CERTIFIED REGISTERED

## 2022-02-18 PROCEDURE — 25010000002 MIDAZOLAM PER 1 MG: Performed by: ANESTHESIOLOGY

## 2022-02-18 PROCEDURE — 52332 CYSTOSCOPY AND TREATMENT: CPT | Performed by: UROLOGY

## 2022-02-18 PROCEDURE — 63710000001 CARVEDILOL 3.125 MG TABLET: Performed by: ANESTHESIOLOGY

## 2022-02-18 PROCEDURE — C1769 GUIDE WIRE: HCPCS | Performed by: UROLOGY

## 2022-02-18 PROCEDURE — C2617 STENT, NON-COR, TEM W/O DEL: HCPCS | Performed by: UROLOGY

## 2022-02-18 DEVICE — STNT URETRL CLASSC DBL PIG 6F 24CM: Type: IMPLANTABLE DEVICE | Site: URETER | Status: FUNCTIONAL

## 2022-02-18 RX ORDER — ONDANSETRON 2 MG/ML
4 INJECTION INTRAMUSCULAR; INTRAVENOUS ONCE AS NEEDED
Status: DISCONTINUED | OUTPATIENT
Start: 2022-02-18 | End: 2022-02-18 | Stop reason: HOSPADM

## 2022-02-18 RX ORDER — SCOLOPAMINE TRANSDERMAL SYSTEM 1 MG/1
1 PATCH, EXTENDED RELEASE TRANSDERMAL CONTINUOUS
Status: DISCONTINUED | OUTPATIENT
Start: 2022-02-18 | End: 2022-02-18 | Stop reason: HOSPADM

## 2022-02-18 RX ORDER — SODIUM CHLORIDE 0.9 % (FLUSH) 0.9 %
3 SYRINGE (ML) INJECTION EVERY 12 HOURS SCHEDULED
Status: CANCELLED | OUTPATIENT
Start: 2022-02-18

## 2022-02-18 RX ORDER — ACETAMINOPHEN 500 MG
1000 TABLET ORAL ONCE
Status: COMPLETED | OUTPATIENT
Start: 2022-02-18 | End: 2022-02-18

## 2022-02-18 RX ORDER — SODIUM CHLORIDE 9 MG/ML
100 INJECTION, SOLUTION INTRAVENOUS CONTINUOUS
Status: CANCELLED | OUTPATIENT
Start: 2022-02-18

## 2022-02-18 RX ORDER — LEVOFLOXACIN 5 MG/ML
500 INJECTION, SOLUTION INTRAVENOUS ONCE
Status: CANCELLED | OUTPATIENT
Start: 2022-02-18 | End: 2022-02-18

## 2022-02-18 RX ORDER — HYDROCODONE BITARTRATE AND ACETAMINOPHEN 5; 325 MG/1; MG/1
1 TABLET ORAL ONCE AS NEEDED
Status: DISCONTINUED | OUTPATIENT
Start: 2022-02-18 | End: 2022-02-18 | Stop reason: HOSPADM

## 2022-02-18 RX ORDER — SODIUM CHLORIDE 0.9 % (FLUSH) 0.9 %
10 SYRINGE (ML) INJECTION AS NEEDED
Status: DISCONTINUED | OUTPATIENT
Start: 2022-02-18 | End: 2022-02-18 | Stop reason: HOSPADM

## 2022-02-18 RX ORDER — PROMETHAZINE HYDROCHLORIDE 12.5 MG/1
25 TABLET ORAL ONCE AS NEEDED
Status: DISCONTINUED | OUTPATIENT
Start: 2022-02-18 | End: 2022-02-18 | Stop reason: HOSPADM

## 2022-02-18 RX ORDER — PROMETHAZINE HYDROCHLORIDE 25 MG/1
25 SUPPOSITORY RECTAL ONCE AS NEEDED
Status: DISCONTINUED | OUTPATIENT
Start: 2022-02-18 | End: 2022-02-18 | Stop reason: HOSPADM

## 2022-02-18 RX ORDER — LIDOCAINE HYDROCHLORIDE 20 MG/ML
INJECTION, SOLUTION INFILTRATION; PERINEURAL AS NEEDED
Status: DISCONTINUED | OUTPATIENT
Start: 2022-02-18 | End: 2022-02-18 | Stop reason: SURG

## 2022-02-18 RX ORDER — MEPERIDINE HYDROCHLORIDE 25 MG/ML
12.5 INJECTION INTRAMUSCULAR; INTRAVENOUS; SUBCUTANEOUS
Status: DISCONTINUED | OUTPATIENT
Start: 2022-02-18 | End: 2022-02-18 | Stop reason: HOSPADM

## 2022-02-18 RX ORDER — PROMETHAZINE HYDROCHLORIDE 12.5 MG/1
12.5 TABLET ORAL ONCE AS NEEDED
Status: DISCONTINUED | OUTPATIENT
Start: 2022-02-18 | End: 2022-02-18 | Stop reason: HOSPADM

## 2022-02-18 RX ORDER — MIDAZOLAM HYDROCHLORIDE 1 MG/ML
2 INJECTION INTRAMUSCULAR; INTRAVENOUS ONCE
Status: COMPLETED | OUTPATIENT
Start: 2022-02-18 | End: 2022-02-18

## 2022-02-18 RX ORDER — LEVOFLOXACIN 5 MG/ML
500 INJECTION, SOLUTION INTRAVENOUS ONCE
Status: COMPLETED | OUTPATIENT
Start: 2022-02-18 | End: 2022-02-18

## 2022-02-18 RX ORDER — CARVEDILOL 3.12 MG/1
3.12 TABLET ORAL 2 TIMES DAILY WITH MEALS
Status: DISCONTINUED | OUTPATIENT
Start: 2022-02-18 | End: 2022-02-18 | Stop reason: HOSPADM

## 2022-02-18 RX ORDER — OXYCODONE HYDROCHLORIDE 5 MG/1
5 TABLET ORAL
Status: DISCONTINUED | OUTPATIENT
Start: 2022-02-18 | End: 2022-02-18 | Stop reason: HOSPADM

## 2022-02-18 RX ORDER — ONDANSETRON 4 MG/1
4 TABLET, FILM COATED ORAL ONCE AS NEEDED
Status: DISCONTINUED | OUTPATIENT
Start: 2022-02-18 | End: 2022-02-18 | Stop reason: HOSPADM

## 2022-02-18 RX ORDER — HYDROCODONE BITARTRATE AND ACETAMINOPHEN 5; 325 MG/1; MG/1
1 TABLET ORAL EVERY 6 HOURS PRN
Qty: 15 TABLET | Refills: 0 | Status: SHIPPED | OUTPATIENT
Start: 2022-02-18 | End: 2022-04-04

## 2022-02-18 RX ORDER — SODIUM CHLORIDE 0.9 % (FLUSH) 0.9 %
10 SYRINGE (ML) INJECTION AS NEEDED
Status: CANCELLED | OUTPATIENT
Start: 2022-02-18

## 2022-02-18 RX ORDER — SODIUM CHLORIDE 9 MG/ML
100 INJECTION, SOLUTION INTRAVENOUS CONTINUOUS
Status: DISCONTINUED | OUTPATIENT
Start: 2022-02-18 | End: 2022-02-18 | Stop reason: HOSPADM

## 2022-02-18 RX ORDER — SODIUM CHLORIDE, SODIUM LACTATE, POTASSIUM CHLORIDE, CALCIUM CHLORIDE 600; 310; 30; 20 MG/100ML; MG/100ML; MG/100ML; MG/100ML
9 INJECTION, SOLUTION INTRAVENOUS CONTINUOUS PRN
Status: DISCONTINUED | OUTPATIENT
Start: 2022-02-18 | End: 2022-02-18 | Stop reason: HOSPADM

## 2022-02-18 RX ORDER — PHENYLEPHRINE HCL IN 0.9% NACL 1 MG/10 ML
SYRINGE (ML) INTRAVENOUS AS NEEDED
Status: DISCONTINUED | OUTPATIENT
Start: 2022-02-18 | End: 2022-02-18 | Stop reason: SURG

## 2022-02-18 RX ORDER — ACETAMINOPHEN 325 MG/1
650 TABLET ORAL ONCE
Status: DISCONTINUED | OUTPATIENT
Start: 2022-02-18 | End: 2022-02-18 | Stop reason: HOSPADM

## 2022-02-18 RX ORDER — PROPOFOL 10 MG/ML
VIAL (ML) INTRAVENOUS AS NEEDED
Status: DISCONTINUED | OUTPATIENT
Start: 2022-02-18 | End: 2022-02-18 | Stop reason: SURG

## 2022-02-18 RX ORDER — SODIUM CHLORIDE 0.9 % (FLUSH) 0.9 %
3 SYRINGE (ML) INJECTION EVERY 12 HOURS SCHEDULED
Status: DISCONTINUED | OUTPATIENT
Start: 2022-02-18 | End: 2022-02-18 | Stop reason: HOSPADM

## 2022-02-18 RX ORDER — MAGNESIUM HYDROXIDE 1200 MG/15ML
LIQUID ORAL AS NEEDED
Status: DISCONTINUED | OUTPATIENT
Start: 2022-02-18 | End: 2022-02-18 | Stop reason: HOSPADM

## 2022-02-18 RX ADMIN — MIDAZOLAM 2 MG: 1 INJECTION INTRAMUSCULAR; INTRAVENOUS at 16:24

## 2022-02-18 RX ADMIN — SCOPALAMINE 1 PATCH: 1 PATCH, EXTENDED RELEASE TRANSDERMAL at 13:26

## 2022-02-18 RX ADMIN — LIDOCAINE HYDROCHLORIDE 50 MG: 20 INJECTION, SOLUTION INFILTRATION; PERINEURAL at 17:26

## 2022-02-18 RX ADMIN — LEVOFLOXACIN 500 MG: 5 INJECTION, SOLUTION INTRAVENOUS at 17:19

## 2022-02-18 RX ADMIN — Medication 100 MCG: at 17:25

## 2022-02-18 RX ADMIN — SODIUM CHLORIDE, POTASSIUM CHLORIDE, SODIUM LACTATE AND CALCIUM CHLORIDE 9 ML/HR: 600; 310; 30; 20 INJECTION, SOLUTION INTRAVENOUS at 13:39

## 2022-02-18 RX ADMIN — PROPOFOL 100 MCG/KG/MIN: 10 INJECTION, EMULSION INTRAVENOUS at 17:23

## 2022-02-18 RX ADMIN — Medication 200 MCG: at 17:44

## 2022-02-18 RX ADMIN — PROPOFOL 20 MG: 10 INJECTION, EMULSION INTRAVENOUS at 17:23

## 2022-02-18 RX ADMIN — CARVEDILOL 3.12 MG: 3.12 TABLET, FILM COATED ORAL at 13:27

## 2022-02-18 RX ADMIN — PROPOFOL 30 MG: 10 INJECTION, EMULSION INTRAVENOUS at 17:26

## 2022-02-18 RX ADMIN — Medication 100 MCG: at 17:35

## 2022-02-18 RX ADMIN — ACETAMINOPHEN 1000 MG: 500 TABLET ORAL at 13:26

## 2022-02-18 RX ADMIN — PROPOFOL 100 MG: 10 INJECTION, EMULSION INTRAVENOUS at 17:22

## 2022-02-18 NOTE — H&P (VIEW-ONLY)
[facility]   UROLOGY HISTORY AND PHYSICAL    Patient Name: Loretta Ren  : 1955  MRN: [id number]  Primary Care Physician:  Natalie Mukherjee APRN  Date of admission: (Not on file)    Subjective   Subjective     Chief Complaint: Ureteral stone    HPI:    Loretta Ren is a 66 y.o. female right ureteral stone with UTI    Review of Systems     10 systems reviewed and are negative other than what is listed in HPI    Personal History     Past Medical History:   Diagnosis Date   • Abnormal CT of the chest 2019   • Allergic rhinitis    • Allergic rhinitis due to allergen 2018   • Anxiety 2017    IMPROVED SINCE STARTING TRINTELLIX   • Anxiety disorder 2018   • Diabetes mellitus (HCC) 2018   • Dizziness 2017    THE PATIENT REPORTS A TRANSIENT BOUT OF DIZZINESS THAT SHE ATTRIBUTES TO STRESS. HER LAST BOUT OF DIZZINESS WAS IN 2017.  I DID REVIEW HER CDS WHICH WERE UNREVEALING. I WILL REQUEST RECORES TO BE SENT TO REVIEW   • DM2 (diabetes mellitus, type 2) ([facility])    • Duodenal ulcer    • Essential hypertension 2018   • Facial pain 2017    THE PATIENT REPORTS A TRANSIENT BOUT OF FACIAL PAIN WITH RADIATION TOEARDS THE RIGHT EAR. SHE NOTES THAT THIS HAS RESOLVED. I DID PERSOANLLY REVIEW HER CT FACE WHICH WAS ESSENTIALLY UNREVELAING   • Fibrosis lung ([facility]) 2018   • Generalized anxiety disorder 2019   • GERD (gastroesophageal reflux disease) 2018   • Heart murmur    • Hemorrhoids    • Hyperlipidemia 2018   • Mitral valve disorder    • Osteopenia 2019   • Other screening mammogram 2018   • PVC (premature ventricular contraction) 2021   • Renal calculus or stone    • Sarcoidosis    • Sinus trouble    • Streptococcal sore throat    • Type 2 diabetes mellitus with hyperglycemia, without long-term current use of insulin ([facility]) 2019   • Urinary tract infection        Past Surgical History:   Procedure Laterality  Date   • CHOLECYSTECTOMY     • COLONOSCOPY  2008    cologard 2018   • CYSTOSCOPY     • LUNG BIOPSY  1992   • PROSTATE LASER ABLATION/ENUCLEATION         Family History: family history includes Breast cancer (age of onset: 80) in her mother; Heart disease in her daughter and mother; Kidney cancer (age of onset: 78) in her father. Otherwise pertinent FHx was reviewed and not pertinent to current issue.    Social History:  reports that she has never smoked. She has never used smokeless tobacco. She reports that she does not drink alcohol and does not use drugs.    Home Medications:  B-12, Calcium, HYDROcodone-acetaminophen, MegaRed Omega-3 Krill Oil, Sulfamethoxazole-Trimethoprim, Vortioxetine HBr, carvedilol, cholecalciferol, dexlansoprazole, fluticasone, loratadine, ondansetron ODT, and pantoprazole      Allergies:  Allergies   Allergen Reactions   • Aspirin GI Intolerance   • Ceftriaxone Rash       Objective   Objective     Vitals:   Temp:  [97.6 °F (36.4 °C)] 97.6 °F (36.4 °C)  Heart Rate:  [92] 92  Resp:  [16-18] 16  BP: (119)/(81) 119/81  Physical Exam    Constitutional: Awake, alert    Respiratory: Clear to auscultation bilaterally, nonlabored respirations    Cardiovascular: RRR, no murmurs, rubs, or gallops, palpable pedal pulses bilaterally    Skin: No rashes     Result Review    Result Review:  I have personally reviewed the results from the time of this admission to 2/18/2022 10:59 EST and agree with these findings:  []  Laboratory  []  Microbiology  []  Radiology  []  EKG/Telemetry   []  Cardiology/Vascular   []  Pathology  []  Old records  []  Other:    Assessment/Plan   Assessment / Plan     Brief Patient Summary:  Loretta Ren is a 66 y.o. female     Active Hospital Problems:  There are no active hospital problems to display for this patient.  Right ureteral stone    Plan:   To operating room for right ureteral stent placement.  Risks and benefits were discussed including bleeding, infection and  damage to the urinary system.  We also discussed the risk of anesthesia up to and including death.  Patient voiced understanding and would like to proceed.    Electronically signed by Deacon Rosales MD, 02/18/22, 10:59 AM EST.

## 2022-02-18 NOTE — ANESTHESIA PREPROCEDURE EVALUATION
Anesthesia Evaluation     Patient summary reviewed and Nursing notes reviewed   no history of anesthetic complications:  NPO Solid Status: > 8 hours  NPO Liquid Status: > 2 hours           Airway   Mallampati: II  TM distance: >3 FB  Neck ROM: full  No difficulty expected  Dental      Pulmonary - normal exam    breath sounds clear to auscultation  (+) recent URI,   Cardiovascular - normal exam  Exercise tolerance: good (4-7 METS)    Rhythm: regular  Rate: normal    (+) hypertension, valvular problems/murmurs, hyperlipidemia,       Neuro/Psych  (+) psychiatric history,    GI/Hepatic/Renal/Endo    (+)  PUD,  renal disease, diabetes mellitus type 2,     Musculoskeletal (-) negative ROS    Abdominal    Substance History - negative use     OB/GYN negative ob/gyn ROS         Other - negative ROS                     Anesthesia Plan    ASA 2     general   (Total IV Anesthesia    Patient understands anesthesia not responsible for dental damage.  )  intravenous induction     Anesthetic plan, all risks, benefits, and alternatives have been provided, discussed and informed consent has been obtained with: patient.    Plan discussed with CRNA.        CODE STATUS:

## 2022-02-18 NOTE — DISCHARGE INSTRUCTIONS
Follow up with your PCP, Higinio LANGSTON for reevaluation recheck your blood pressure.    Call Dr. Rosales office in the a.m. and he will work you into his schedule.    Take Lortab as directed for pain.  This is a narcotic pain reliever.  Narcotics can be addictive.  This contains Tylenol do not take Tylenol taking this medication.  Do not share with friends or family.    Continue your Bactrim    Return the emergency department for fever, vomiting, unable to urinate, new change or worsening symptoms.

## 2022-02-18 NOTE — PROGRESS NOTES
Chief Complaint: Urologic complaint    Subjective         History of Present Illness  Loretta Ren is a 66 y.o. female     Ureteral stone    Patient has been having pain since February 3. No pain currently.    Patient is currently on Bactrim. She just started this. She is currently having some burning and pressure.    No fevers or chills.    2/17/2022 CT abdomen/pelvis without - 6 mm stone in the proximal right ureter.  No other stones.  3/17/2022 UA-trace bacteria red cells and white cells, nitrite negative, creatinine 0.68, GFR 86    For kidney stones, a few lithotripsies passed some stones before.    no gross hematuria    No  malignancies in the family, her mom did have nephrolithiasis    No cardiopulmonary history.  Patient does not smoke.  Patient does not use blood thinner.        Objective     Past Medical History:   Diagnosis Date   • Abnormal CT of the chest 04/29/2019   • Allergic rhinitis    • Allergic rhinitis due to allergen 11/06/2018   • Anxiety 11/06/2017    IMPROVED SINCE STARTING TRINTELLIX   • Anxiety disorder 11/06/2018   • Diabetes mellitus (HCC) 11/06/2018   • Dizziness 11/06/2017    THE PATIENT REPORTS A TRANSIENT BOUT OF DIZZINESS THAT SHE ATTRIBUTES TO STRESS. HER LAST BOUT OF DIZZINESS WAS IN JULY 2017.  I DID REVIEW HER CDS WHICH WERE UNREVEALING. I WILL REQUEST RECORES TO BE SENT TO REVIEW   • DM2 (diabetes mellitus, type 2) ([facility])    • Duodenal ulcer    • Essential hypertension 11/06/2018   • Facial pain 11/06/2017    THE PATIENT REPORTS A TRANSIENT BOUT OF FACIAL PAIN WITH RADIATION TOEARDS THE RIGHT EAR. SHE NOTES THAT THIS HAS RESOLVED. I DID PERSOANLLY REVIEW HER CT FACE WHICH WAS ESSENTIALLY UNREVELAING   • Fibrosis lung (HCC) 11/06/2018   • Generalized anxiety disorder 04/29/2019   • GERD (gastroesophageal reflux disease) 11/06/2018   • Heart murmur    • Hemorrhoids    • Hyperlipidemia 11/06/2018   • Mitral valve disorder    • Osteopenia 04/29/2019   • Other screening  mammogram 12/2018   • PVC (premature ventricular contraction) 8/4/2021   • Renal calculus or stone    • Sarcoidosis    • Sinus trouble    • Streptococcal sore throat    • Type 2 diabetes mellitus with hyperglycemia, without long-term current use of insulin ([facility]) 04/29/2019   • Urinary tract infection        Past Surgical History:   Procedure Laterality Date   • CHOLECYSTECTOMY     • COLONOSCOPY  2008    cologard 2018   • CYSTOSCOPY     • LUNG BIOPSY  1992   • PROSTATE LASER ABLATION/ENUCLEATION           Current Outpatient Medications:   •  Calcium 500-100 MG-UNIT chewable tablet, Chew 1 tablet Every 4 (Four) Hours As Needed., Disp: , Rfl:   •  carvedilol (COREG) 3.125 MG tablet, Take 1 tablet by mouth 2 (Two) Times a Day., Disp: 90 tablet, Rfl: 3  •  cholecalciferol (VITAMIN D3) 10 MCG (400 UNIT) tablet, Vitamin D3 10 mcg (400 unit) oral tablet take 1 tablet by oral route daily   Active, Disp: , Rfl:   •  Cobalamin Combinations (B-12) 100-5000 MCG sublingual tablet, B12 5,000-100 mcg sublingual lozenge dissolve 1 lozenge by sublingual route daily   Suspended, Disp: , Rfl:   •  dexlansoprazole (Dexilant) 60 MG capsule, Dexilant 60 mg oral capsule,biphase delayed releas take 1 capsule (60 mg) by oral route once daily   Suspended, Disp: , Rfl:   •  fluticasone (FLONASE) 50 MCG/ACT nasal spray, USE TWO SPRAYS IN EACH NOSTRIL DAILY AS NEEDED, Disp: , Rfl:   •  HYDROcodone-acetaminophen (NORCO) 7.5-325 MG per tablet, Take 1 tablet by mouth Every 6 (Six) Hours As Needed for Moderate Pain ., Disp: 12 tablet, Rfl: 0  •  loratadine (CLARITIN) 10 MG tablet, loratadine 10 mg oral tablet take 1 tablet (10 mg) by oral route once daily   Active, Disp: , Rfl:   •  MegaRed Omega-3 Krill Oil 350 MG capsule, MegaRed Omega-3 Krill Oil oral Taking QD   Active, Disp: , Rfl:   •  ondansetron ODT (ZOFRAN-ODT) 4 MG disintegrating tablet, Place 1 tablet on the tongue Every 8 (Eight) Hours As Needed for Nausea or Vomiting., Disp: 15 tablet,  Rfl: 0  •  pantoprazole (PROTONIX) 40 MG EC tablet, TAKE ONE TABLET BY MOUTH EVERY DAY, Disp: 90 tablet, Rfl: 1  •  Sulfamethoxazole-Trimethoprim (BACTRIM PO), Take  by mouth., Disp: , Rfl:   •  Vortioxetine HBr (Trintellix) 10 MG tablet, Take 10 mg by mouth Daily., Disp: 30 tablet, Rfl: 5  No current facility-administered medications for this visit.    Allergies   Allergen Reactions   • Aspirin GI Intolerance   • Ceftriaxone Rash        Family History   Problem Relation Age of Onset   • Breast cancer Mother 80   • Heart disease Mother    • Kidney cancer Father 78        bladder   • Heart disease Daughter        Social History     Socioeconomic History   • Marital status:    Tobacco Use   • Smoking status: Never Smoker   • Smokeless tobacco: Never Used   Vaping Use   • Vaping Use: Never used   Substance and Sexual Activity   • Alcohol use: Never   • Drug use: Never   • Sexual activity: Defer     Partners: Male     Birth control/protection: Post-menopausal       Vital Signs:   There were no vitals taken for this visit.     Physical exam    Alert and orient x3  Well appearing, well developed, in no acute distress   Unlabored respirations  Nontender/nondistended  CTAB  RRR    Grossly oriented to person, place and time, judgment is intact, normal mood and affect              Assessment and Plan    Diagnoses and all orders for this visit:    1. Nephrolithiasis (Primary)    CT images and read reviewed and discussed with the patient    Records reviewed and summarized in the chart    Patient with urine culture coming back with gram-negative bacteria, she is also having some suprapubic pain with an obstructive stone.  I did discuss the best plan of action would be to go ahead and place a stent emergently in the operating room in the next few hours.  We will plan on cystoscopy with right ureteral stent placement.  We discussed this is to prevent worsening sickness/sepsis.    We did discuss she will also need definitive  stone removal in 10 to 14 days.  We discussed if a stent is left in place could cause loss or damage of kidney.  Patient voiced understanding      I did place orders for both stent placement and ureteroscopy today    We did discuss she has fever greater than 101, intractable nausea/vomiting or intractable pain should go to emergency room.

## 2022-02-18 NOTE — H&P
[facility]   UROLOGY HISTORY AND PHYSICAL    Patient Name: Loretta Ren  : 1955  MRN: [id number]  Primary Care Physician:  Natalie Mukherjee APRN  Date of admission: (Not on file)    Subjective   Subjective     Chief Complaint: Ureteral stone    HPI:    Loretta Ren is a 66 y.o. female right ureteral stone with UTI    Review of Systems     10 systems reviewed and are negative other than what is listed in HPI    Personal History     Past Medical History:   Diagnosis Date   • Abnormal CT of the chest 2019   • Allergic rhinitis    • Allergic rhinitis due to allergen 2018   • Anxiety 2017    IMPROVED SINCE STARTING TRINTELLIX   • Anxiety disorder 2018   • Diabetes mellitus (HCC) 2018   • Dizziness 2017    THE PATIENT REPORTS A TRANSIENT BOUT OF DIZZINESS THAT SHE ATTRIBUTES TO STRESS. HER LAST BOUT OF DIZZINESS WAS IN 2017.  I DID REVIEW HER CDS WHICH WERE UNREVEALING. I WILL REQUEST RECORES TO BE SENT TO REVIEW   • DM2 (diabetes mellitus, type 2) ([facility])    • Duodenal ulcer    • Essential hypertension 2018   • Facial pain 2017    THE PATIENT REPORTS A TRANSIENT BOUT OF FACIAL PAIN WITH RADIATION TOEARDS THE RIGHT EAR. SHE NOTES THAT THIS HAS RESOLVED. I DID PERSOANLLY REVIEW HER CT FACE WHICH WAS ESSENTIALLY UNREVELAING   • Fibrosis lung ([facility]) 2018   • Generalized anxiety disorder 2019   • GERD (gastroesophageal reflux disease) 2018   • Heart murmur    • Hemorrhoids    • Hyperlipidemia 2018   • Mitral valve disorder    • Osteopenia 2019   • Other screening mammogram 2018   • PVC (premature ventricular contraction) 2021   • Renal calculus or stone    • Sarcoidosis    • Sinus trouble    • Streptococcal sore throat    • Type 2 diabetes mellitus with hyperglycemia, without long-term current use of insulin ([facility]) 2019   • Urinary tract infection        Past Surgical History:   Procedure Laterality  Date   • CHOLECYSTECTOMY     • COLONOSCOPY  2008    cologard 2018   • CYSTOSCOPY     • LUNG BIOPSY  1992   • PROSTATE LASER ABLATION/ENUCLEATION         Family History: family history includes Breast cancer (age of onset: 80) in her mother; Heart disease in her daughter and mother; Kidney cancer (age of onset: 78) in her father. Otherwise pertinent FHx was reviewed and not pertinent to current issue.    Social History:  reports that she has never smoked. She has never used smokeless tobacco. She reports that she does not drink alcohol and does not use drugs.    Home Medications:  B-12, Calcium, HYDROcodone-acetaminophen, MegaRed Omega-3 Krill Oil, Sulfamethoxazole-Trimethoprim, Vortioxetine HBr, carvedilol, cholecalciferol, dexlansoprazole, fluticasone, loratadine, ondansetron ODT, and pantoprazole      Allergies:  Allergies   Allergen Reactions   • Aspirin GI Intolerance   • Ceftriaxone Rash       Objective   Objective     Vitals:   Temp:  [97.6 °F (36.4 °C)] 97.6 °F (36.4 °C)  Heart Rate:  [92] 92  Resp:  [16-18] 16  BP: (119)/(81) 119/81  Physical Exam    Constitutional: Awake, alert    Respiratory: Clear to auscultation bilaterally, nonlabored respirations    Cardiovascular: RRR, no murmurs, rubs, or gallops, palpable pedal pulses bilaterally    Skin: No rashes     Result Review    Result Review:  I have personally reviewed the results from the time of this admission to 2/18/2022 10:59 EST and agree with these findings:  []  Laboratory  []  Microbiology  []  Radiology  []  EKG/Telemetry   []  Cardiology/Vascular   []  Pathology  []  Old records  []  Other:    Assessment/Plan   Assessment / Plan     Brief Patient Summary:  Loretat Ren is a 66 y.o. female     Active Hospital Problems:  There are no active hospital problems to display for this patient.  Right ureteral stone    Plan:   To operating room for right ureteral stent placement.  Risks and benefits were discussed including bleeding, infection and  damage to the urinary system.  We also discussed the risk of anesthesia up to and including death.  Patient voiced understanding and would like to proceed.    Electronically signed by Deacon Rosales MD, 02/18/22, 10:59 AM EST.

## 2022-02-18 NOTE — ANESTHESIA POSTPROCEDURE EVALUATION
Patient: Loretta Ren    Procedure Summary     Date: 02/18/22 Room / Location: [facility] OR 07 / [facility] MAIN OR    Anesthesia Start: 1718 Anesthesia Stop: 1745    Procedure: Cystoscopy  with right ureteral stent placement (Right ) Diagnosis:       Ureteral stone      (Ureteral stone [N20.1])    Surgeons: Deacon Rosales MD Provider: Keshav Díaz MD    Anesthesia Type: general ASA Status: 2          Anesthesia Type: general    Vitals  Vitals Value Taken Time   /53 02/18/22 1748   Temp     Pulse 81 02/18/22 1753   Resp     SpO2 99 % 02/18/22 1753   Vitals shown include unvalidated device data.        Post Anesthesia Care and Evaluation    Patient location during evaluation: bedside  Patient participation: complete - patient participated  Level of consciousness: awake  Pain management: adequate  Airway patency: patent  Anesthetic complications: No anesthetic complications  PONV Status: none  Cardiovascular status: acceptable and stable  Respiratory status: acceptable  Hydration status: acceptable    Comments: An Anesthesiologist personally participated in the most demanding procedures (including induction and emergence if applicable) in the anesthesia plan, monitored the course of anesthesia administration at frequent intervals and remained physically present and available for immediate diagnosis and treatment of emergencies.

## 2022-02-18 NOTE — DISCHARGE INSTRUCTIONS
DISCHARGE INSTRUCTIONS CYSTOSCOPY Loretta Ren     ? For your surgery you had:  ? General anesthesia (you may have a sore throat for the first 24 hours)  ? IV sedation  ? Local anesthesia  ? Monitored anesthesia care  ? You received a medicated patch for nausea prevention today (behind your ear). It is recommended that you remove it 24-48 hours post-operatively. It must be removed within 72 hours.  ? You have received an anesthesia medication today that can cause hormonal forms of birth control to be ineffective. You should use a different form of birth control (to prevent pregnancy) for 7 days.   ? You may experience dizziness, drowsiness, or lightheadedness for several hours following surgery.  ? Do not stay alone today or tonight.  ? Limit your activity for 24 hours.  ? You should not drive, operate machinery, drink alcohol, or sign legally binding documents for 24 hours or while you are taking pain medication.  ? Resume your diet slowly.  Follow any special dietary instructions you may have been given by your doctor.    NOTIFY YOUR DOCTOR IF YOU EXPERIENCE ANY OF THE FOLLOWING:  ? Temperature greater than 101 degrees Fahrenheit  ? Shaking Chills  ? Redness or excessive drainage from incision  ? Nausea, vomiting and/or pain that is not controlled by prescribed medications  ? Increase in bleeding or bleeding that is excessive  ? Unable to urinate in 6 hours after surgery  ? If unable to reach your doctor, please go to the closest Emergency Room   ? Following your cystoscopy exam, you may experience burning upon urination.  You may also pass some bloody urine.  If the burning sensation and/or bloody urine should persist beyond 48 hours, call your doctor.  ? To encourage kidney and bladder function, you should drink as much fluid as possible.  ? If you have difficulty urinating, try sitting in a bath tub of warm water.  If you become uncomfortable because you cannot urinate, call your doctor or come to the Emergency  Room at the hospital.  ? Medications per physician instructions as indicated on Discharge Medication Information Sheet.      AZO (cranberry tablets) will help with the pressure and urge to urinate.          URETERAL STENT TEACHING SHEET   Frequently Asked Questions about Ureteral Stents        What is a ureteral stent?  A ureteral stent is a small, soft tube that is about 10-12 inches long and about as big around as a swizzle stick. It is placed in the ureter, which is the muscular tube that drains urine from the kidney to the bladder.  Each end of the stent is shaped like a pigtail. One end of the tube sits inside the kidney, and the other end sits in the bladder. The purpose of the stent is to hold the ureter open and maintain proper drainage of urine.  It usually is temporary but may be used long term.  This decision will be made by your doctor.  When is the stent used?  A stent is used in a number of situations.  A stent is placed if the doctor is concerned that urine might not drain well through the ureter, due to blockage or as a reaction to surgery.  Stents usually will be placed in a ureter that has been irritated during a procedure that involves removal of a stone.  Stents for this reason are usually left in place for about a week.  These stents ensure that the ureter does not spasm and collapse after the trauma of the procedure.  Does the stent cause any symptoms?  Many patients do feel the stent.  Most commonly there is bladder irritation, typically causing frequent and/or uncomfortable urination and a sensation of pressure over the bladder or in the lower abdomen. Bloody urine is common. Some patients experience pain in the kidney during urination. There can be urinary tract infections associated with the stent so it is very important that you practice good hygiene. Once the stent is removed, all of the symptoms associated with the stent will quite rapidly disappear (oftentimes immediately). While the  stent is in place, you may carry on with most normal activities, including work.  Strenuous activity may cause discomfort. Showering is preferred to tub baths while your stent is in place. If you must take a tub bath, do not use bubble baths or oils as they may lead to infection.   When should the stent be removed?  In some cases the stent can be removed just a few days after the procedure, while in other cases your doctor may recommend that it stay in place for longer periods of time.  You must follow-up with your doctor as directed when you have a stent since stents left in place for too long can lead to blockage, stone formation, urinary infections and ultimately, kidney failure if they are not removed. If your stent passes by itself when you urinate, or you inadvertently pull the string and begin to have large amounts of urine leakage, follow the procedure below to remove the stent.  How is the stent removed?  In some instances, your doctor may decide to leave a string on the stent.  The string is attached to the stent and the string comes out of the urethra (the natural hole where urine exits the body).  The doctor may tell you to remove the stent at home on a given day.  Stents with the string may be removed in a matter of seconds by pulling on the string.  When removing the stent, constant, steady force should be applied, to avoid starting and stopping. Something should be placed below the patient to catch any urine that leaks during removal.  You may choose to remove the stent in the shower, just be sure to have someone close at hand in case you become weak or dizzy.  After the stent is removed, it should be placed in a sealed bag and taken with you to your next office visit.  On the rare occasion that the string breaks and the stent doesn't come out, you should call your doctors office. You should urinate within 4-6 hours after your stent is removed. For this reason, your stent should be removed early in  the day.  If you are unable to urinate within 6 hours, call your doctor.   Stents without a string can be removed in the office using a cystoscope. Cystoscopy involves placement of a small flexible tube through the urethra (the hole where urine exits the body). The procedure, which usually only takes a few minutes and causes little discomfort, is performed in the office. Most patients tolerate having the stent removed using only a topical anesthetic instilled into the urethra. Since no IV line is inserted and there is no anesthesia, you do not have to be accompanied by anyone else and you can eat normally before and after the procedure.  Your doctor may choose to have you return to the hospital to have your stent removed. In this case, you will receive anesthesia and must not eat or drink anything after midnight.

## 2022-02-18 NOTE — OP NOTE
CYSTOSCOPY URETERAL CATHETER/STENT INSERTION  Procedure Report    Patient Name:  Loretta Ren  YOB: 1955    Date of Surgery:  2/18/2022      Pre-op Diagnosis:   Ureteral stone [N20.1]       Postop diagnosis:    Same    Procedure/CPT® Codes:      Procedure(s):  Cystoscopy  with right ureteral stent placement 6 x 24, no string    Staff:  Surgeon(s):  Deacon Rosales MD         Anesthesia: Monitored Anesthesia Care    Estimated Blood Loss: none    Implants:    Implant Name Type Inv. Item Serial No.  Lot No. LRB No. Used Action   STNT URETRL CLASSC DBL PIG 6F 24CM - [id number] - [id number] Stent STNT URETRL CLASSC DBL PIG 6F 24CM [id number] [facility] [id number] Right 1 Implanted       Specimen:          None        Findings:     Bladder with a lot of erythema globally.  No masses  Stent placed with no string    Complications: none    Description of Procedure: After informed consent patient taken to the operating room.  Patient was laid supine and placed under general anesthesia by the anesthesia team.  At this point patient was placed in dorsal lithotomy position and prepped and draped in normal sterile fashion.  A multidisciplinary timeout was undertaken documenting the correct patient site and procedure.  At this point a 22 rigid cystoscope was placed into the urethra . Bladder was normal.  At this point a Glidewire was placed up the right   ureter without any issue under fluoroscopic guidance.   A 6 x 24 ureteral stent was then placed over the Glidewire through a rigid cystoscope without issue and had a good curl in the bladder under direct vision and a good curl in the right renal pelvis under fluoroscopy.  Bladder was drained.  Patient tolerated the procedure well, he was taken to the postanesthesia care unit without issue.          Deacon Rosales MD     Date: 2/18/2022  Time: 17:38 EST

## 2022-02-19 ENCOUNTER — TELEPHONE (OUTPATIENT)
Dept: EMERGENCY DEPT | Facility: HOSPITAL | Age: 67
End: 2022-02-19

## 2022-02-19 DIAGNOSIS — N39.0 URINARY TRACT INFECTION WITH HEMATURIA, SITE UNSPECIFIED: Primary | ICD-10-CM

## 2022-02-19 DIAGNOSIS — R31.9 URINARY TRACT INFECTION WITH HEMATURIA, SITE UNSPECIFIED: Primary | ICD-10-CM

## 2022-02-19 LAB — BACTERIA SPEC AEROBE CULT: ABNORMAL

## 2022-02-19 RX ORDER — LEVOFLOXACIN 500 MG/1
500 TABLET, FILM COATED ORAL DAILY
Qty: 9 TABLET | Refills: 0 | Status: ON HOLD | OUTPATIENT
Start: 2022-02-19 | End: 2022-03-02

## 2022-02-21 ENCOUNTER — TELEPHONE (OUTPATIENT)
Dept: UROLOGY | Facility: CLINIC | Age: 67
End: 2022-02-21

## 2022-02-21 DIAGNOSIS — N39.0 URINARY TRACT INFECTION WITHOUT HEMATURIA, SITE UNSPECIFIED: ICD-10-CM

## 2022-02-21 DIAGNOSIS — Z01.818 PRE-PROCEDURAL EXAMINATION: Primary | ICD-10-CM

## 2022-02-21 NOTE — TELEPHONE ENCOUNTER
Called patient to let her know we will need a repeat urine culture completed prior to upcoming surgery. Order placed and patient verbalized understanding.

## 2022-02-21 NOTE — TELEPHONE ENCOUNTER
----- Message from Deacon Rosales MD sent at 2/18/2022  5:41 PM EST -----  Regarding: stent  Stent placed, she should already be scheduled for surgery right side ureteroscopy.  We just need to have her repeat a urine culture in about a week

## 2022-02-24 ENCOUNTER — APPOINTMENT (OUTPATIENT)
Dept: CT IMAGING | Facility: HOSPITAL | Age: 67
End: 2022-02-24

## 2022-02-25 ENCOUNTER — LAB (OUTPATIENT)
Dept: LAB | Facility: HOSPITAL | Age: 67
End: 2022-02-25

## 2022-02-25 DIAGNOSIS — N39.0 URINARY TRACT INFECTION WITHOUT HEMATURIA, SITE UNSPECIFIED: ICD-10-CM

## 2022-02-25 DIAGNOSIS — Z01.818 PRE-PROCEDURAL EXAMINATION: ICD-10-CM

## 2022-02-25 LAB — SARS-COV-2 RNA PNL SPEC NAA+PROBE: NOT DETECTED

## 2022-02-25 PROCEDURE — U0004 COV-19 TEST NON-CDC HGH THRU: HCPCS

## 2022-02-25 PROCEDURE — 87086 URINE CULTURE/COLONY COUNT: CPT

## 2022-02-25 PROCEDURE — C9803 HOPD COVID-19 SPEC COLLECT: HCPCS

## 2022-02-26 LAB — BACTERIA SPEC AEROBE CULT: NO GROWTH

## 2022-03-02 ENCOUNTER — HOSPITAL ENCOUNTER (OUTPATIENT)
Facility: HOSPITAL | Age: 67
Setting detail: HOSPITAL OUTPATIENT SURGERY
Discharge: HOME | End: 2022-03-02
Attending: UROLOGY | Admitting: UROLOGY

## 2022-03-02 ENCOUNTER — APPOINTMENT (OUTPATIENT)
Dept: MAMMOGRAPHY | Facility: HOSPITAL | Age: 67
End: 2022-03-02

## 2022-03-02 ENCOUNTER — APPOINTMENT (OUTPATIENT)
Dept: GENERAL RADIOLOGY | Facility: HOSPITAL | Age: 67
End: 2022-03-02

## 2022-03-02 ENCOUNTER — ANESTHESIA (OUTPATIENT)
Dept: PERIOP | Facility: HOSPITAL | Age: 67
End: 2022-03-02

## 2022-03-02 ENCOUNTER — APPOINTMENT (OUTPATIENT)
Dept: BONE DENSITY | Facility: HOSPITAL | Age: 67
End: 2022-03-02

## 2022-03-02 ENCOUNTER — ANESTHESIA EVENT (OUTPATIENT)
Dept: PERIOP | Facility: HOSPITAL | Age: 67
End: 2022-03-02

## 2022-03-02 VITALS
SYSTOLIC BLOOD PRESSURE: 116 MMHG | BODY MASS INDEX: 25.72 KG/M2 | DIASTOLIC BLOOD PRESSURE: 76 MMHG | WEIGHT: 136.24 LBS | RESPIRATION RATE: 15 BRPM | HEART RATE: 70 BPM | OXYGEN SATURATION: 95 % | HEIGHT: 61 IN | TEMPERATURE: 97.8 F

## 2022-03-02 DIAGNOSIS — N20.1 URETERAL STONE: ICD-10-CM

## 2022-03-02 DIAGNOSIS — N20.0 NEPHROLITHIASIS: Primary | ICD-10-CM

## 2022-03-02 LAB
GLUCOSE BLDC GLUCOMTR-MCNC: 143 MG/DL (ref 70–99)
QT INTERVAL: 382 MS

## 2022-03-02 PROCEDURE — 25010000002 MIDAZOLAM PER 1 MG: Performed by: ANESTHESIOLOGY

## 2022-03-02 PROCEDURE — C1769 GUIDE WIRE: HCPCS | Performed by: UROLOGY

## 2022-03-02 PROCEDURE — 25010000002 HYDROMORPHONE 1 MG/ML SOLUTION: Performed by: NURSE ANESTHETIST, CERTIFIED REGISTERED

## 2022-03-02 PROCEDURE — 93005 ELECTROCARDIOGRAM TRACING: CPT | Performed by: UROLOGY

## 2022-03-02 PROCEDURE — 82962 GLUCOSE BLOOD TEST: CPT

## 2022-03-02 PROCEDURE — 25010000002 LEVOFLOXACIN PER 250 MG: Performed by: UROLOGY

## 2022-03-02 PROCEDURE — C1758 CATHETER, URETERAL: HCPCS | Performed by: UROLOGY

## 2022-03-02 PROCEDURE — C2617 STENT, NON-COR, TEM W/O DEL: HCPCS | Performed by: UROLOGY

## 2022-03-02 PROCEDURE — 52332 CYSTOSCOPY AND TREATMENT: CPT | Performed by: UROLOGY

## 2022-03-02 PROCEDURE — 25010000002 PROPOFOL 10 MG/ML EMULSION: Performed by: NURSE ANESTHETIST, CERTIFIED REGISTERED

## 2022-03-02 PROCEDURE — 25010000002 ONDANSETRON PER 1 MG: Performed by: NURSE ANESTHETIST, CERTIFIED REGISTERED

## 2022-03-02 PROCEDURE — 25010000002 FENTANYL CITRATE (PF) 50 MCG/ML SOLUTION: Performed by: NURSE ANESTHETIST, CERTIFIED REGISTERED

## 2022-03-02 PROCEDURE — 93010 ELECTROCARDIOGRAM REPORT: CPT | Performed by: SPECIALIST

## 2022-03-02 PROCEDURE — 25010000002 DEXAMETHASONE PER 1 MG: Performed by: NURSE ANESTHETIST, CERTIFIED REGISTERED

## 2022-03-02 PROCEDURE — 25010000002 METOCLOPRAMIDE PER 10 MG: Performed by: ANESTHESIOLOGY

## 2022-03-02 PROCEDURE — 52352 CYSTOURETERO W/STONE REMOVE: CPT | Performed by: UROLOGY

## 2022-03-02 PROCEDURE — 74018 RADEX ABDOMEN 1 VIEW: CPT

## 2022-03-02 PROCEDURE — 76000 FLUOROSCOPY <1 HR PHYS/QHP: CPT

## 2022-03-02 DEVICE — STNT URETRL CLASSC DBL PIG 6F 24CM: Type: IMPLANTABLE DEVICE | Site: URETER | Status: FUNCTIONAL

## 2022-03-02 RX ORDER — ACETAMINOPHEN 325 MG/1
650 TABLET ORAL ONCE
Status: DISCONTINUED | OUTPATIENT
Start: 2022-03-02 | End: 2022-03-02 | Stop reason: HOSPADM

## 2022-03-02 RX ORDER — ONDANSETRON 2 MG/ML
4 INJECTION INTRAMUSCULAR; INTRAVENOUS ONCE AS NEEDED
Status: DISCONTINUED | OUTPATIENT
Start: 2022-03-02 | End: 2022-03-02 | Stop reason: HOSPADM

## 2022-03-02 RX ORDER — SODIUM CHLORIDE 9 MG/ML
100 INJECTION, SOLUTION INTRAVENOUS CONTINUOUS
Status: DISCONTINUED | OUTPATIENT
Start: 2022-03-02 | End: 2022-03-02 | Stop reason: HOSPADM

## 2022-03-02 RX ORDER — PROMETHAZINE HYDROCHLORIDE 12.5 MG/1
12.5 TABLET ORAL ONCE AS NEEDED
Status: DISCONTINUED | OUTPATIENT
Start: 2022-03-02 | End: 2022-03-02 | Stop reason: HOSPADM

## 2022-03-02 RX ORDER — ONDANSETRON 4 MG/1
4 TABLET, FILM COATED ORAL ONCE AS NEEDED
Status: DISCONTINUED | OUTPATIENT
Start: 2022-03-02 | End: 2022-03-02 | Stop reason: HOSPADM

## 2022-03-02 RX ORDER — PROPOFOL 10 MG/ML
VIAL (ML) INTRAVENOUS AS NEEDED
Status: DISCONTINUED | OUTPATIENT
Start: 2022-03-02 | End: 2022-03-02 | Stop reason: SURG

## 2022-03-02 RX ORDER — SCOLOPAMINE TRANSDERMAL SYSTEM 1 MG/1
1 PATCH, EXTENDED RELEASE TRANSDERMAL CONTINUOUS
Status: DISCONTINUED | OUTPATIENT
Start: 2022-03-02 | End: 2022-03-02 | Stop reason: HOSPADM

## 2022-03-02 RX ORDER — ROCURONIUM BROMIDE 10 MG/ML
INJECTION, SOLUTION INTRAVENOUS AS NEEDED
Status: DISCONTINUED | OUTPATIENT
Start: 2022-03-02 | End: 2022-03-02 | Stop reason: SURG

## 2022-03-02 RX ORDER — FENTANYL CITRATE 50 UG/ML
INJECTION, SOLUTION INTRAMUSCULAR; INTRAVENOUS AS NEEDED
Status: DISCONTINUED | OUTPATIENT
Start: 2022-03-02 | End: 2022-03-02 | Stop reason: SURG

## 2022-03-02 RX ORDER — PROMETHAZINE HYDROCHLORIDE 12.5 MG/1
25 TABLET ORAL ONCE AS NEEDED
Status: DISCONTINUED | OUTPATIENT
Start: 2022-03-02 | End: 2022-03-02 | Stop reason: HOSPADM

## 2022-03-02 RX ORDER — HYDROCODONE BITARTRATE AND ACETAMINOPHEN 7.5; 325 MG/1; MG/1
1 TABLET ORAL ONCE AS NEEDED
Status: DISCONTINUED | OUTPATIENT
Start: 2022-03-02 | End: 2022-03-02 | Stop reason: HOSPADM

## 2022-03-02 RX ORDER — MAGNESIUM HYDROXIDE 1200 MG/15ML
LIQUID ORAL AS NEEDED
Status: DISCONTINUED | OUTPATIENT
Start: 2022-03-02 | End: 2022-03-02 | Stop reason: HOSPADM

## 2022-03-02 RX ORDER — SODIUM CHLORIDE, SODIUM LACTATE, POTASSIUM CHLORIDE, CALCIUM CHLORIDE 600; 310; 30; 20 MG/100ML; MG/100ML; MG/100ML; MG/100ML
9 INJECTION, SOLUTION INTRAVENOUS CONTINUOUS PRN
Status: DISCONTINUED | OUTPATIENT
Start: 2022-03-02 | End: 2022-03-02 | Stop reason: HOSPADM

## 2022-03-02 RX ORDER — SODIUM CHLORIDE 0.9 % (FLUSH) 0.9 %
10 SYRINGE (ML) INJECTION AS NEEDED
Status: DISCONTINUED | OUTPATIENT
Start: 2022-03-02 | End: 2022-03-02 | Stop reason: HOSPADM

## 2022-03-02 RX ORDER — HYDROCODONE BITARTRATE AND ACETAMINOPHEN 7.5; 325 MG/1; MG/1
1 TABLET ORAL EVERY 6 HOURS PRN
Qty: 15 TABLET | Refills: 0 | Status: SHIPPED | OUTPATIENT
Start: 2022-03-02 | End: 2022-04-04

## 2022-03-02 RX ORDER — LEVOFLOXACIN 5 MG/ML
500 INJECTION, SOLUTION INTRAVENOUS ONCE
Status: COMPLETED | OUTPATIENT
Start: 2022-03-02 | End: 2022-03-02

## 2022-03-02 RX ORDER — MEPERIDINE HYDROCHLORIDE 25 MG/ML
12.5 INJECTION INTRAMUSCULAR; INTRAVENOUS; SUBCUTANEOUS
Status: DISCONTINUED | OUTPATIENT
Start: 2022-03-02 | End: 2022-03-02 | Stop reason: HOSPADM

## 2022-03-02 RX ORDER — PROMETHAZINE HYDROCHLORIDE 25 MG/1
25 SUPPOSITORY RECTAL ONCE AS NEEDED
Status: DISCONTINUED | OUTPATIENT
Start: 2022-03-02 | End: 2022-03-02 | Stop reason: HOSPADM

## 2022-03-02 RX ORDER — SODIUM CHLORIDE 0.9 % (FLUSH) 0.9 %
3 SYRINGE (ML) INJECTION EVERY 12 HOURS SCHEDULED
Status: DISCONTINUED | OUTPATIENT
Start: 2022-03-02 | End: 2022-03-02 | Stop reason: HOSPADM

## 2022-03-02 RX ORDER — ACETAMINOPHEN 500 MG
1000 TABLET ORAL ONCE
Status: COMPLETED | OUTPATIENT
Start: 2022-03-02 | End: 2022-03-02

## 2022-03-02 RX ORDER — LIDOCAINE HYDROCHLORIDE 20 MG/ML
INJECTION, SOLUTION INFILTRATION; PERINEURAL AS NEEDED
Status: DISCONTINUED | OUTPATIENT
Start: 2022-03-02 | End: 2022-03-02 | Stop reason: SURG

## 2022-03-02 RX ORDER — MIDAZOLAM HYDROCHLORIDE 1 MG/ML
2 INJECTION INTRAMUSCULAR; INTRAVENOUS ONCE
Status: COMPLETED | OUTPATIENT
Start: 2022-03-02 | End: 2022-03-02

## 2022-03-02 RX ORDER — ONDANSETRON 2 MG/ML
INJECTION INTRAMUSCULAR; INTRAVENOUS AS NEEDED
Status: DISCONTINUED | OUTPATIENT
Start: 2022-03-02 | End: 2022-03-02 | Stop reason: SURG

## 2022-03-02 RX ORDER — METOCLOPRAMIDE HYDROCHLORIDE 5 MG/ML
10 INJECTION INTRAMUSCULAR; INTRAVENOUS ONCE AS NEEDED
Status: COMPLETED | OUTPATIENT
Start: 2022-03-02 | End: 2022-03-02

## 2022-03-02 RX ORDER — DEXAMETHASONE SODIUM PHOSPHATE 4 MG/ML
INJECTION, SOLUTION INTRA-ARTICULAR; INTRALESIONAL; INTRAMUSCULAR; INTRAVENOUS; SOFT TISSUE AS NEEDED
Status: DISCONTINUED | OUTPATIENT
Start: 2022-03-02 | End: 2022-03-02 | Stop reason: SURG

## 2022-03-02 RX ORDER — OXYCODONE HYDROCHLORIDE 5 MG/1
5 TABLET ORAL
Status: DISCONTINUED | OUTPATIENT
Start: 2022-03-02 | End: 2022-03-02 | Stop reason: HOSPADM

## 2022-03-02 RX ADMIN — HYDROMORPHONE HYDROCHLORIDE 0.5 MG: 1 INJECTION, SOLUTION INTRAMUSCULAR; INTRAVENOUS; SUBCUTANEOUS at 12:52

## 2022-03-02 RX ADMIN — METOCLOPRAMIDE HYDROCHLORIDE 10 MG: 5 INJECTION INTRAMUSCULAR; INTRAVENOUS at 11:23

## 2022-03-02 RX ADMIN — ROCURONIUM BROMIDE 30 MG: 10 INJECTION INTRAVENOUS at 11:49

## 2022-03-02 RX ADMIN — OXYCODONE HYDROCHLORIDE 5 MG: 5 TABLET ORAL at 12:52

## 2022-03-02 RX ADMIN — ACETAMINOPHEN 1000 MG: 500 TABLET ORAL at 10:40

## 2022-03-02 RX ADMIN — FENTANYL CITRATE 50 MCG: 50 INJECTION, SOLUTION INTRAMUSCULAR; INTRAVENOUS at 11:49

## 2022-03-02 RX ADMIN — MIDAZOLAM HYDROCHLORIDE 2 MG: 1 INJECTION, SOLUTION INTRAMUSCULAR; INTRAVENOUS at 11:23

## 2022-03-02 RX ADMIN — SCOPALAMINE 1 PATCH: 1 PATCH, EXTENDED RELEASE TRANSDERMAL at 10:40

## 2022-03-02 RX ADMIN — ONDANSETRON 4 MG: 2 INJECTION INTRAMUSCULAR; INTRAVENOUS at 12:07

## 2022-03-02 RX ADMIN — PROPOFOL 150 MG: 10 INJECTION, EMULSION INTRAVENOUS at 11:49

## 2022-03-02 RX ADMIN — LIDOCAINE HYDROCHLORIDE 60 MG: 20 INJECTION, SOLUTION INFILTRATION; PERINEURAL at 11:49

## 2022-03-02 RX ADMIN — SODIUM CHLORIDE, POTASSIUM CHLORIDE, SODIUM LACTATE AND CALCIUM CHLORIDE 9 ML/HR: 600; 310; 30; 20 INJECTION, SOLUTION INTRAVENOUS at 10:41

## 2022-03-02 RX ADMIN — SUGAMMADEX 200 MG: 100 INJECTION, SOLUTION INTRAVENOUS at 12:04

## 2022-03-02 RX ADMIN — SODIUM CHLORIDE, POTASSIUM CHLORIDE, SODIUM LACTATE AND CALCIUM CHLORIDE: 600; 310; 30; 20 INJECTION, SOLUTION INTRAVENOUS at 12:24

## 2022-03-02 RX ADMIN — FENTANYL CITRATE 50 MCG: 50 INJECTION, SOLUTION INTRAMUSCULAR; INTRAVENOUS at 11:56

## 2022-03-02 RX ADMIN — DEXAMETHASONE SODIUM PHOSPHATE 4 MG: 4 INJECTION INTRA-ARTICULAR; INTRALESIONAL; INTRAMUSCULAR; INTRAVENOUS; SOFT TISSUE at 12:07

## 2022-03-02 RX ADMIN — LEVOFLOXACIN 500 MG: 5 INJECTION, SOLUTION INTRAVENOUS at 11:47

## 2022-03-02 NOTE — ANESTHESIA PREPROCEDURE EVALUATION
Anesthesia Evaluation     Patient summary reviewed and Nursing notes reviewed   no history of anesthetic complications:  NPO Solid Status: > 8 hours  NPO Liquid Status: > 2 hours           Airway   Mallampati: II  TM distance: >3 FB  Neck ROM: full  No difficulty expected  Dental      Pulmonary - negative pulmonary ROS and normal exam    breath sounds clear to auscultation  Cardiovascular - normal exam  Exercise tolerance: good (4-7 METS)    ECG reviewed  Patient on routine beta blocker and Beta blocker given within 24 hours of surgery  Rhythm: regular  Rate: normal    (+) hypertension, valvular problems/murmurs, hyperlipidemia,       Neuro/Psych  (+) dizziness/light headedness, psychiatric history,    GI/Hepatic/Renal/Endo    (+)  GERD, PUD,  renal disease, diabetes mellitus type 2,     Musculoskeletal (-) negative ROS    Abdominal    Substance History - negative use     OB/GYN negative ob/gyn ROS         Other - negative ROS              Results for ZACHERY DIXON (MRN [id number]) as of 3/2/2022 10:19   Ref. Range 2/17/2022 15:07   Glucose Latest Ref Range: 65 - 99 mg/dL 127 (H)   Sodium Latest Ref Range: 136 - 145 mmol/L 139   Potassium Latest Ref Range: 3.5 - 5.2 mmol/L 4.3   CO2 Latest Ref Range: 22.0 - 29.0 mmol/L 26.3   Chloride Latest Ref Range: 98 - 107 mmol/L 99   Anion Gap Latest Ref Range: 5.0 - 15.0 mmol/L 13.7   Creatinine Latest Ref Range: 0.57 - 1.00 mg/dL 0.68   BUN Latest Ref Range: 8 - 23 mg/dL 9   BUN/Creatinine Ratio Latest Ref Range: 7.0 - 25.0  13.2   Calcium Latest Ref Range: 8.6 - 10.5 mg/dL 9.7   eGFR Non  Am Latest Ref Range: >60 mL/min/1.73 86   Alkaline Phosphatase Latest Ref Range: 39 - 117 U/L 111   Total Protein Latest Ref Range: 6.0 - 8.5 g/dL 7.6   ALT (SGPT) Latest Ref Range: 1 - 33 U/L 30   AST (SGOT) Latest Ref Range: 1 - 32 U/L 19   Total Bilirubin Latest Ref Range: 0.0 - 1.2 mg/dL 0.4   Albumin Latest Ref Range: 3.50 - 5.20 g/dL 4.60   Globulin Latest Units: gm/dL  3.0   A/G Ratio Latest Units: g/dL 1.5     Results for ZACHERY DIXON (MRN [id number]) as of 3/2/2022 10:19   Ref. Range 2/17/2022 15:07   Hemoglobin Latest Ref Range: 12.0 - 15.9 g/dL 15.5   Hematocrit Latest Ref Range: 34.0 - 46.6 % 45.5     NORMAL ECG -  Sinus rhythm               Anesthesia Plan    ASA 2     general   (Patient understands anesthesia not responsible for dental damage.)  intravenous induction     Anesthetic plan, all risks, benefits, and alternatives have been provided, discussed and informed consent has been obtained with: patient.  Use of blood products discussed with patient .   Plan discussed with CRNA.        CODE STATUS:

## 2022-03-02 NOTE — DISCHARGE INSTRUCTIONS
DISCHARGE INSTRUCTIONS CYSTOSCOPY Remove stent on Sunday evening if unable to urinate follow up with Dr. Rosales Monday morning.      ? For your surgery you had:  ? General anesthesia (you may have a sore throat for the first 24 hours)  ? IV sedation  ? Local anesthesia  ? Monitored anesthesia care  ? You received a medicated patch for nausea prevention today (behind your ear). It is recommended that you remove it 24-48 hours post-operatively. It must be removed within 72 hours.  ? You have received an anesthesia medication today that can cause hormonal forms of birth control to be ineffective. You should use a different form of birth control (to prevent pregnancy) for 7 days.   ? You may experience dizziness, drowsiness, or lightheadedness for several hours following surgery.  ? Do not stay alone today or tonight.  ? Limit your activity for 24 hours.  ? You should not drive, operate machinery, drink alcohol, or sign legally binding documents for 24 hours or while you are taking pain medication.  ? Resume your diet slowly.  Follow any special dietary instructions you may have been given by your doctor.  Last dose of pain medication given at:   .  NOTIFY YOUR DOCTOR IF YOU EXPERIENCE ANY OF THE FOLLOWING:  ? Temperature greater than 101 degrees Fahrenheit  ? Shaking Chills  ? Redness or excessive drainage from incision  ? Nausea, vomiting and/or pain that is not controlled by prescribed medications  ? Increase in bleeding or bleeding that is excessive  ? Unable to urinate in 6 hours after surgery  ? If unable to reach your doctor, please go to the closest Emergency Room   ? Following your cystoscopy exam, you may experience burning upon urination.  You may also pass some bloody urine.  If the burning sensation and/or bloody urine should persist beyond 48 hours, call your doctor.  ? To encourage kidney and bladder function, you should drink as much fluid as possible.  ? If you have difficulty urinating, try sitting  in a bath tub of warm water.  If you become uncomfortable because you cannot urinate, call your doctor or come to the Emergency Room at the hospital.  ? Medications per physician instructions as indicated on Discharge Medication Information Sheet.  ? You should see Dr. Rosales for follow-up care  on April 1, 2022 at 1030 AM.  Phone number: [phone]      SPECIAL INSTRUCTIONS:

## 2022-03-02 NOTE — OP NOTE
CYSTOSCOPY URETEROSCOPY  Procedure Report    Patient Name:  Loretta Ren  YOB: 1955    Date of Surgery:  3/2/2022      Pre-op Diagnosis:   Ureteral stone [N20.1]       Postop diagnosis:    Same    Procedure/CPT® Codes:      Procedure(s):  CYSTOSCOPY   RIGHT URETEROSCOPY  STONE BASKET EXTRACTION   RIGHT URETERAL STENT INSERTION, 6 x 24 with string left on    Staff:  Surgeon(s):  Deacon Rosales MD         Anesthesia: General    Estimated Blood Loss: minimal    Implants:    Implant Name Type Inv. Item Serial No.  Lot No. LRB No. Used Action   STNT URETRL CLASSC DBL PIG 6F 24CM - [id number] Stent STNT URETRL CLASSC DBL PIG 6F 24CM  [facility] [id number] Right 1 Implanted       Specimen:          stone        Findings:     All stones removed in the right side  6 x 24 stent with string    Complications: none    Description of Procedure: After informed consent patient taken to the operating room.  Patient was laid supine and placed under general anesthesia by the anesthesia team.  At this point patient was placed in dorsal lithotomy position and prepped and draped in normal sterile fashion.  A multidisciplinary timeout was undertaken documenting the correct patient site and procedure.  At this point a 22 rigid cystoscope was placed into the urethra . Bladder was normal.      Stent was brought to the urethral meatus with a grasper.    At this point a Glidewire was placed up the stent without any issue under fluoroscopic guidance.  I then remove the stent.    I then placed a dual-lumen catheter and a stiff wire alongside the Glidewire under fluoroscopic guidance.  The dual-lumen was removed and a ureteral access sheath was placed into the distal ureter without any problem.  I removed the obturator and wire and placed a flexible ureteroscope up the ueter.  The stone was identified.  Stone was basketed out with a no tip nitinol basket.  I then took the flexible ureteroscope up and  check the rest of the ureter and the upper collecting system.  There were no further stones.  Brought the actual sheath out under direct vision and there was no further stones.      Right   side was free of stones.  A 6 x 24 ureteral stent was then placed over the Glidewire through a rigid cystoscope without issue and had a good curl in the bladder under direct vision and a good curl in the   Right   renal pelvis under fluoroscopy.  Bladder was drained.  Patient tolerated the procedure well, he was taken to the postanesthesia care unit without issue.    Stent had string left on      Deacon Rosales MD     Date: 3/2/2022  Time: 12:24 EST

## 2022-03-02 NOTE — ANESTHESIA POSTPROCEDURE EVALUATION
Patient: Loretta Ren    Procedure Summary     Date: 03/02/22 Room / Location: [facility] OR 07 / [facility] MAIN OR    Anesthesia Start: 1140 Anesthesia Stop: 1227    Procedure: CYSTOSCOPY, RIGHT URETEROSCOPY, LASERTRIPSY, STONE BASKET EXTRACTION AND STENT INSERTION (Right Ureter) Diagnosis:       Ureteral stone      (Ureteral stone [N20.1])    Surgeons: Deacon Rosales MD Provider: Calvin Davenport MD    Anesthesia Type: general ASA Status: 2          Anesthesia Type: general    Vitals  Vitals Value Taken Time   /81 03/02/22 1252   Temp 36.9 °C (98.4 °F) 03/02/22 1225   Pulse 80 03/02/22 1257   Resp 16 03/02/22 1240   SpO2 99 % 03/02/22 1257   Vitals shown include unvalidated device data.        Post Anesthesia Care and Evaluation    Patient location during evaluation: bedside  Patient participation: complete - patient participated  Level of consciousness: responsive to light touch, responsive to noxious stimuli, responsive to verbal stimuli, responsive to painful stimuli and responsive to physical stimuli  Pain score: 2  Pain management: adequate  Airway patency: patent  Anesthetic complications: No anesthetic complications  PONV Status: none  Cardiovascular status: acceptable and stable  Respiratory status: acceptable and nasal cannula  Hydration status: acceptable    Comments: An Anesthesiologist personally participated in the most demanding procedures (including induction and emergence if applicable) in the anesthesia plan, monitored the course of anesthesia administration at frequent intervals and remained physically present and available for immediate diagnosis and treatment of emergencies.

## 2022-03-09 ENCOUNTER — OFFICE VISIT (OUTPATIENT)
Dept: FAMILY MEDICINE CLINIC | Facility: CLINIC | Age: 67
End: 2022-03-09

## 2022-03-09 VITALS
DIASTOLIC BLOOD PRESSURE: 80 MMHG | HEART RATE: 79 BPM | TEMPERATURE: 97.4 F | RESPIRATION RATE: 12 BRPM | OXYGEN SATURATION: 97 % | BODY MASS INDEX: 25.98 KG/M2 | WEIGHT: 137.6 LBS | HEIGHT: 61 IN | SYSTOLIC BLOOD PRESSURE: 140 MMHG

## 2022-03-09 DIAGNOSIS — R30.0 DYSURIA: Primary | ICD-10-CM

## 2022-03-09 DIAGNOSIS — F41.1 GENERALIZED ANXIETY DISORDER: ICD-10-CM

## 2022-03-09 LAB
BILIRUB BLD-MCNC: NEGATIVE MG/DL
CLARITY, POC: CLEAR
COLOR UR: YELLOW
EXPIRATION DATE: ABNORMAL
GLUCOSE UR STRIP-MCNC: NEGATIVE MG/DL
KETONES UR QL: NEGATIVE
LEUKOCYTE EST, POC: ABNORMAL
Lab: ABNORMAL
NITRITE UR-MCNC: NEGATIVE MG/ML
PH UR: 7.5 [PH] (ref 5–8)
PROT UR STRIP-MCNC: NEGATIVE MG/DL
RBC # UR STRIP: NEGATIVE /UL
SP GR UR: 1.02 (ref 1–1.03)
UROBILINOGEN UR QL: NORMAL

## 2022-03-09 PROCEDURE — 81003 URINALYSIS AUTO W/O SCOPE: CPT | Performed by: NURSE PRACTITIONER

## 2022-03-09 PROCEDURE — 99213 OFFICE O/P EST LOW 20 MIN: CPT | Performed by: NURSE PRACTITIONER

## 2022-03-09 PROCEDURE — 87086 URINE CULTURE/COLONY COUNT: CPT | Performed by: NURSE PRACTITIONER

## 2022-03-09 RX ORDER — CITALOPRAM 10 MG/1
10 TABLET ORAL DAILY
Qty: 30 TABLET | Refills: 1 | Status: SHIPPED | OUTPATIENT
Start: 2022-03-09 | End: 2022-04-04 | Stop reason: SDUPTHER

## 2022-03-09 NOTE — PROGRESS NOTES
Chief Complaint  Follow-up (Kidney stone )    Subjective          Loretta Ren presents to [facility]  History of Present Illness  She is here to follow-up with her kidney stones.  She sees Dr. Rosales again in April.  She is having a little bit of burning last night into today but other than that she is feeling better overall.  She had a stent placed and then they went in with the lithotripsy and replaced another stent on Wednesday.  She took the stent out herself at home.  She is flushing her kidneys with extra water cranberry and cranberry tablets.  She is doing okay overall.  She is also here to discuss about anxiety.  She is trying to wean off Trintellix because of the cost.  She likes Trintellix.  It does well with her anxiety though due to the cost she has to stop.  She is willing to try any other medication to help with her anxiety.  She knows that it is already starting to creep up on her as she can feel it in her body.  No suicidal thoughts.      Allergies  Aspirin and Ceftriaxone    Social History     Tobacco Use   • Smoking status: Never Smoker   • Smokeless tobacco: Never Used   Vaping Use   • Vaping Use: Never used   Substance Use Topics   • Alcohol use: Never   • Drug use: Never       Family History   Problem Relation Age of Onset   • Breast cancer Mother 80   • Heart disease Mother    • Kidney cancer Father 78        bladder   • Heart disease Daughter    • Malig Hyperthermia Neg Hx         Health Maintenance Due   Topic Date Due   • COLORECTAL CANCER SCREENING  Never done   • COVID-19 Vaccine (1) Never done   • TDAP/TD VACCINES (1 - Tdap) Never done   • ZOSTER VACCINE (2 of 2) 02/05/2015   • Pneumococcal Vaccine 65+ (1 of 1 - PPSV23) 04/10/2018   • ANNUAL WELLNESS VISIT  Never done   • DIABETIC FOOT EXAM  Never done   • DXA SCAN  12/16/2021   • DIABETIC EYE EXAM  12/28/2021        Immunization History   Administered Date(s) Administered   • Fluzone High-Dose  "65+yrs 09/30/2021   • Influenza, Unspecified 10/06/2020   • PEDS-Pneumococcal Conjugate (PCV7) 10/31/2014   • Shingrix 12/11/2014       Review of Systems   Constitutional: Negative for fatigue.   Respiratory: Negative for cough and shortness of breath.    Cardiovascular: Negative for chest pain.   Gastrointestinal: Negative for diarrhea, nausea and vomiting.   Genitourinary: Positive for dysuria and urgency. Negative for frequency.        Objective       Vitals:    03/09/22 1142   BP: 140/80   Pulse: 79   Resp: 12   Temp: 97.4 °F (36.3 °C)   SpO2: 97%   Weight: 62.4 kg (137 lb 9.6 oz)   Height: 154.9 cm (61\")       Body mass index is 26 kg/m².         Physical Exam  Vitals reviewed.   Constitutional:       Appearance: Normal appearance. She is well-developed.   Cardiovascular:      Rate and Rhythm: Normal rate and regular rhythm.      Heart sounds: Normal heart sounds. No murmur heard.  Pulmonary:      Effort: Pulmonary effort is normal.      Breath sounds: Normal breath sounds.   Neurological:      Mental Status: She is alert and oriented to person, place, and time.      Cranial Nerves: No cranial nerve deficit.      Motor: No weakness.   Psychiatric:         Mood and Affect: Mood and affect normal.             Result Review :     The following data was reviewed by: KIAN Lyons on 03/09/2022:    UA    Urinalysis 2/7/22 2/17/22 2/17/22 3/9/22     1508 1508    Squamous Epithelial Cells, UA   0-2    Specific Gravity, UA  1.009     Ketones, UA Negative Negative  Negative   Blood, UA  Moderate (2+) (A)     Leukocytes, UA Trace (A) Moderate (2+) (A)  Small (1+) (A)   Nitrite, UA  Negative     RBC, UA   13-20 (A)    WBC, UA   21-30 (A)    Bacteria, UA   Trace (A)    (A) Abnormal value                           Assessment and Plan      Diagnoses and all orders for this visit:    1. Dysuria (Primary)  -     POCT urinalysis dipstick, automated  -     Urine Culture - Urine, Urine, Clean Catch    2. Generalized " anxiety disorder  -     citalopram (CeleXA) 10 MG tablet; Take 1 tablet by mouth Daily.  Dispense: 30 tablet; Refill: 1            Follow Up     Return if symptoms worsen or fail to improve.  She will follow up with Dr. Rosales.  We will culture her urine today as it has a small amount of white cells.  We will also stop Trintellix and start Celexa at 10 but we may need to increase up to 20.  She will keep me posted in about 2 to 3 weeks and let me know how she is doing.  Patient was given instructions and counseling regarding her condition or for health maintenance advice. Please see specific information pulled into the AVS if appropriate.         Natalie Mukherjee, KIAN  03/09/2022

## 2022-03-10 LAB — BACTERIA SPEC AEROBE CULT: NORMAL

## 2022-03-21 ENCOUNTER — HOSPITAL ENCOUNTER (OUTPATIENT)
Dept: ULTRASOUND IMAGING | Facility: HOSPITAL | Age: 67
Discharge: HOME | End: 2022-03-21
Admitting: UROLOGY

## 2022-03-21 DIAGNOSIS — N20.0 NEPHROLITHIASIS: ICD-10-CM

## 2022-03-21 PROCEDURE — 76775 US EXAM ABDO BACK WALL LIM: CPT

## 2022-03-23 ENCOUNTER — TELEPHONE (OUTPATIENT)
Dept: FAMILY MEDICINE CLINIC | Facility: CLINIC | Age: 67
End: 2022-03-23

## 2022-03-23 NOTE — TELEPHONE ENCOUNTER
Patient phone office let Natalie know she is doing well on celexa. Covered by Insurance.  Has f/u 4/4/22

## 2022-03-29 DIAGNOSIS — F41.1 GENERALIZED ANXIETY DISORDER: ICD-10-CM

## 2022-03-29 RX ORDER — CITALOPRAM 10 MG/1
TABLET ORAL
Qty: 30 TABLET | Refills: 1 | OUTPATIENT
Start: 2022-03-29

## 2022-03-31 ENCOUNTER — LAB (OUTPATIENT)
Dept: LAB | Facility: HOSPITAL | Age: 67
End: 2022-03-31

## 2022-03-31 DIAGNOSIS — I10 ESSENTIAL HYPERTENSION: ICD-10-CM

## 2022-03-31 DIAGNOSIS — E11.9 TYPE 2 DIABETES MELLITUS WITHOUT COMPLICATION, WITHOUT LONG-TERM CURRENT USE OF INSULIN: ICD-10-CM

## 2022-03-31 DIAGNOSIS — E78.2 MIXED HYPERLIPIDEMIA: ICD-10-CM

## 2022-03-31 LAB
ALBUMIN SERPL-MCNC: 4.3 G/DL (ref 3.5–5.2)
ALBUMIN UR-MCNC: <1.2 MG/DL
ALBUMIN/GLOB SERPL: 1.5 G/DL
ALP SERPL-CCNC: 94 U/L (ref 39–117)
ALT SERPL W P-5'-P-CCNC: 30 U/L (ref 1–33)
ANION GAP SERPL CALCULATED.3IONS-SCNC: 10.7 MMOL/L (ref 5–15)
AST SERPL-CCNC: 24 U/L (ref 1–32)
BASOPHILS # BLD AUTO: 0.01 10*3/MM3 (ref 0–0.2)
BASOPHILS NFR BLD AUTO: 0.1 % (ref 0–1.5)
BILIRUB SERPL-MCNC: 0.7 MG/DL (ref 0–1.2)
BUN SERPL-MCNC: 10 MG/DL (ref 8–23)
BUN/CREAT SERPL: 15.6 (ref 7–25)
CALCIUM SPEC-SCNC: 9.4 MG/DL (ref 8.6–10.5)
CHLORIDE SERPL-SCNC: 104 MMOL/L (ref 98–107)
CHOLEST SERPL-MCNC: 181 MG/DL (ref 0–200)
CO2 SERPL-SCNC: 22.3 MMOL/L (ref 22–29)
CREAT SERPL-MCNC: 0.64 MG/DL (ref 0.57–1)
DEPRECATED RDW RBC AUTO: 40.2 FL (ref 37–54)
EGFRCR SERPLBLD CKD-EPI 2021: 96.4 ML/MIN/1.73
EOSINOPHIL # BLD AUTO: 0.1 10*3/MM3 (ref 0–0.4)
EOSINOPHIL NFR BLD AUTO: 1.4 % (ref 0.3–6.2)
ERYTHROCYTE [DISTWIDTH] IN BLOOD BY AUTOMATED COUNT: 12.9 % (ref 12.3–15.4)
GLOBULIN UR ELPH-MCNC: 2.9 GM/DL
GLUCOSE SERPL-MCNC: 138 MG/DL (ref 65–99)
HBA1C MFR BLD: 7.5 % (ref 4.8–5.6)
HCT VFR BLD AUTO: 42.8 % (ref 34–46.6)
HDLC SERPL-MCNC: 41 MG/DL (ref 40–60)
HGB BLD-MCNC: 14.8 G/DL (ref 12–15.9)
IMM GRANULOCYTES # BLD AUTO: 0.02 10*3/MM3 (ref 0–0.05)
IMM GRANULOCYTES NFR BLD AUTO: 0.3 % (ref 0–0.5)
LDLC SERPL CALC-MCNC: 120 MG/DL (ref 0–100)
LDLC/HDLC SERPL: 2.87 {RATIO}
LYMPHOCYTES # BLD AUTO: 1.67 10*3/MM3 (ref 0.7–3.1)
LYMPHOCYTES NFR BLD AUTO: 24.1 % (ref 19.6–45.3)
MCH RBC QN AUTO: 30 PG (ref 26.6–33)
MCHC RBC AUTO-ENTMCNC: 34.6 G/DL (ref 31.5–35.7)
MCV RBC AUTO: 86.8 FL (ref 79–97)
MONOCYTES # BLD AUTO: 0.52 10*3/MM3 (ref 0.1–0.9)
MONOCYTES NFR BLD AUTO: 7.5 % (ref 5–12)
NEUTROPHILS NFR BLD AUTO: 4.61 10*3/MM3 (ref 1.7–7)
NEUTROPHILS NFR BLD AUTO: 66.6 % (ref 42.7–76)
NRBC BLD AUTO-RTO: 0 /100 WBC (ref 0–0.2)
PLATELET # BLD AUTO: 224 10*3/MM3 (ref 140–450)
PMV BLD AUTO: 11.6 FL (ref 6–12)
POTASSIUM SERPL-SCNC: 4.5 MMOL/L (ref 3.5–5.2)
PROT SERPL-MCNC: 7.2 G/DL (ref 6–8.5)
RBC # BLD AUTO: 4.93 10*6/MM3 (ref 3.77–5.28)
SODIUM SERPL-SCNC: 137 MMOL/L (ref 136–145)
TRIGL SERPL-MCNC: 111 MG/DL (ref 0–150)
TSH SERPL DL<=0.05 MIU/L-ACNC: 1.56 UIU/ML (ref 0.27–4.2)
VLDLC SERPL-MCNC: 20 MG/DL (ref 5–40)
WBC NRBC COR # BLD: 6.93 10*3/MM3 (ref 3.4–10.8)

## 2022-03-31 PROCEDURE — 82043 UR ALBUMIN QUANTITATIVE: CPT

## 2022-03-31 PROCEDURE — 80053 COMPREHEN METABOLIC PANEL: CPT

## 2022-03-31 PROCEDURE — 84443 ASSAY THYROID STIM HORMONE: CPT

## 2022-03-31 PROCEDURE — 80061 LIPID PANEL: CPT

## 2022-03-31 PROCEDURE — 85025 COMPLETE CBC W/AUTO DIFF WBC: CPT

## 2022-03-31 PROCEDURE — 83036 HEMOGLOBIN GLYCOSYLATED A1C: CPT

## 2022-03-31 PROCEDURE — 36415 COLL VENOUS BLD VENIPUNCTURE: CPT

## 2022-04-04 ENCOUNTER — OFFICE VISIT (OUTPATIENT)
Dept: FAMILY MEDICINE CLINIC | Facility: CLINIC | Age: 67
End: 2022-04-04

## 2022-04-04 VITALS
WEIGHT: 138.7 LBS | HEART RATE: 77 BPM | TEMPERATURE: 96.6 F | BODY MASS INDEX: 26.19 KG/M2 | HEIGHT: 61 IN | RESPIRATION RATE: 12 BRPM | OXYGEN SATURATION: 98 % | SYSTOLIC BLOOD PRESSURE: 126 MMHG | DIASTOLIC BLOOD PRESSURE: 75 MMHG

## 2022-04-04 DIAGNOSIS — E78.2 MIXED HYPERLIPIDEMIA: ICD-10-CM

## 2022-04-04 DIAGNOSIS — Z12.11 SCREEN FOR COLON CANCER: ICD-10-CM

## 2022-04-04 DIAGNOSIS — K21.9 GASTROESOPHAGEAL REFLUX DISEASE WITHOUT ESOPHAGITIS: ICD-10-CM

## 2022-04-04 DIAGNOSIS — F41.1 GENERALIZED ANXIETY DISORDER: ICD-10-CM

## 2022-04-04 DIAGNOSIS — E11.9 TYPE 2 DIABETES MELLITUS WITHOUT COMPLICATION, WITHOUT LONG-TERM CURRENT USE OF INSULIN: ICD-10-CM

## 2022-04-04 DIAGNOSIS — Z00.00 MEDICARE ANNUAL WELLNESS VISIT, SUBSEQUENT: Primary | ICD-10-CM

## 2022-04-04 DIAGNOSIS — I10 ESSENTIAL HYPERTENSION: ICD-10-CM

## 2022-04-04 PROCEDURE — 1126F AMNT PAIN NOTED NONE PRSNT: CPT | Performed by: NURSE PRACTITIONER

## 2022-04-04 PROCEDURE — 1160F RVW MEDS BY RX/DR IN RCRD: CPT | Performed by: NURSE PRACTITIONER

## 2022-04-04 PROCEDURE — 1170F FXNL STATUS ASSESSED: CPT | Performed by: NURSE PRACTITIONER

## 2022-04-04 PROCEDURE — G0439 PPPS, SUBSEQ VISIT: HCPCS | Performed by: NURSE PRACTITIONER

## 2022-04-04 PROCEDURE — 99214 OFFICE O/P EST MOD 30 MIN: CPT | Performed by: NURSE PRACTITIONER

## 2022-04-04 RX ORDER — FLUOROMETHOLONE ACETATE 1 MG/ML
1 SUSPENSION/ DROPS OPHTHALMIC 4 TIMES DAILY
COMMUNITY

## 2022-04-04 RX ORDER — PANTOPRAZOLE SODIUM 40 MG/1
40 TABLET, DELAYED RELEASE ORAL EVERY MORNING
Qty: 90 TABLET | Refills: 1 | Status: SHIPPED | OUTPATIENT
Start: 2022-04-04 | End: 2022-10-21

## 2022-04-04 RX ORDER — CITALOPRAM 10 MG/1
10 TABLET ORAL DAILY
Qty: 90 TABLET | Refills: 1 | Status: SHIPPED | OUTPATIENT
Start: 2022-04-04 | End: 2022-10-17 | Stop reason: SDUPTHER

## 2022-04-04 NOTE — PROGRESS NOTES
The ABCs of the Annual Wellness Visit  Subsequent Medicare Wellness Visit    Chief Complaint   Patient presents with   • Medicare Wellness-subsequent      Subjective    History of Present Illness:  Loretta Ren is a 66 y.o. female who presents for a Subsequent Medicare Wellness Visit.    The following portions of the patient's history were reviewed and   updated as appropriate: allergies, current medications, past family history, past medical history, past social history, past surgical history and problem list.    Compared to one year ago, the patient feels her physical   health is the same.    Compared to one year ago, the patient feels her mental   health is the same.    Recent Hospitalizations:  She was not admitted to the hospital during the last year.       Current Medical Providers:  Patient Care Team:  Natalie Mukherjee APRN as PCP - General (Nurse Practitioner)  Deacon Rosales MD as Consulting Physician (Urology)    Outpatient Medications Prior to Visit   Medication Sig Dispense Refill   • Carboxymethylcellulose Sodium (REFRESH TEARS OP) Apply  to eye(s) as directed by provider.     • carvedilol (COREG) 3.125 MG tablet Take 1 tablet by mouth 2 (Two) Times a Day. 90 tablet 3   • cholecalciferol (VITAMIN D3) 10 MCG (400 UNIT) tablet Vitamin D3 10 mcg (400 unit) oral tablet take 1 tablet by oral route daily   Active     • Cobalamin Combinations (B-12) 100-5000 MCG sublingual tablet B12 5,000-100 mcg sublingual lozenge dissolve 1 lozenge by sublingual route daily   Suspended     • fluorometholone (Flarex) 0.1 % ophthalmic suspension 1 drop 4 (Four) Times a Day.     • fluticasone (FLONASE) 50 MCG/ACT nasal spray 2 sprays into the nostril(s) as directed by provider Daily As Needed.     • loratadine (CLARITIN) 10 MG tablet Take 10 mg by mouth Daily As Needed.     • MegaRed Omega-3 Krill Oil 350 MG capsule MegaRed Omega-3 Krill Oil oral Taking QD   Active     • Trypsin-Castor Oil-Peru Balsam (OPTASE  EX) Apply  topically.     • citalopram (CeleXA) 10 MG tablet Take 1 tablet by mouth Daily. 30 tablet 1   • HYDROcodone-acetaminophen (NORCO) 5-325 MG per tablet Take 1 tablet by mouth Every 6 (Six) Hours As Needed for Moderate Pain  (Pain). 15 tablet 0   • HYDROcodone-acetaminophen (NORCO) 7.5-325 MG per tablet Take 1 tablet by mouth Every 6 (Six) Hours As Needed for Moderate Pain . (Patient taking differently: Take 1 tablet by mouth Every 6 (Six) Hours As Needed for Moderate Pain . PT HAS NOT CURRENTLY TAKING) 12 tablet 0   • HYDROcodone-acetaminophen (NORCO) 7.5-325 MG per tablet Take 1 tablet by mouth Every 6 (Six) Hours As Needed for Moderate Pain  (Pain). 15 tablet 0   • ondansetron ODT (ZOFRAN-ODT) 4 MG disintegrating tablet Place 1 tablet on the tongue Every 8 (Eight) Hours As Needed for Nausea or Vomiting. 15 tablet 0   • pantoprazole (PROTONIX) 40 MG EC tablet TAKE ONE TABLET BY MOUTH EVERY DAY (Patient taking differently: Take 40 mg by mouth Every Morning.) 90 tablet 1     No facility-administered medications prior to visit.       No opioid medication identified on active medication list. I have reviewed chart for other potential  high risk medication/s and harmful drug interactions in the elderly.          Aspirin is not on active medication list.  Aspirin use is not indicated based on review of current medical condition/s. Risk of harm outweighs potential benefits.  .    Patient Active Problem List   Diagnosis   • Hyperlipidemia   • PVC (premature ventricular contraction)   • Allergic rhinitis   • Fibrosis lung (HCC)   • Generalized anxiety disorder   • GERD (gastroesophageal reflux disease)   • Heart murmur   • Osteopenia   • Type 2 diabetes mellitus (HCC)   • Sarcoidosis   • Essential hypertension   • Nephrolithiasis   • Ureteral stone     Advance Care Planning  Advance Directive is not on file.  ACP discussion was declined by the patient. Patient does not have an advance directive, information  "provided.          Objective    Vitals:    04/04/22 1028   BP: 126/75   Pulse: 77   Resp: 12   Temp: 96.6 °F (35.9 °C)   SpO2: 98%   Weight: 62.9 kg (138 lb 11.2 oz)   Height: 154.9 cm (61\")     BMI Readings from Last 1 Encounters:   04/04/22 26.21 kg/m²   BMI is above normal parameters. Recommendations include: nutrition counseling    Does the patient have evidence of cognitive impairment? No    Physical Exam  Vitals reviewed.   Constitutional:       Appearance: Normal appearance. She is well-developed.   Cardiovascular:      Rate and Rhythm: Normal rate and regular rhythm.      Heart sounds: Normal heart sounds. No murmur heard.  Pulmonary:      Effort: Pulmonary effort is normal.      Breath sounds: Normal breath sounds.   Neurological:      Mental Status: She is alert and oriented to person, place, and time.      Cranial Nerves: No cranial nerve deficit.      Motor: No weakness.   Psychiatric:         Mood and Affect: Mood and affect normal.       Lab Results   Component Value Date    TRIG 111 03/31/2022    HDL 41 03/31/2022     (H) 03/31/2022    VLDL 20 03/31/2022    HGBA1C 7.50 (H) 03/31/2022            HEALTH RISK ASSESSMENT    Smoking Status:  Social History     Tobacco Use   Smoking Status Never Smoker   Smokeless Tobacco Never Used     Alcohol Consumption:  Social History     Substance and Sexual Activity   Alcohol Use Never     Fall Risk Screen:    STEADI Fall Risk Assessment was completed, and patient is at LOW risk for falls.Assessment completed on:4/4/2022    Depression Screening:  PHQ-2/PHQ-9 Depression Screening 4/4/2022   Retired PHQ-9 Total Score -   Retired Total Score -   Little Interest or Pleasure in Doing Things 0-->not at all   Feeling Down, Depressed or Hopeless 0-->not at all   PHQ-9: Brief Depression Severity Measure Score 0       Health Habits and Functional and Cognitive Screening:  Functional & Cognitive Status 4/4/2022   Do you have difficulty preparing food and eating? No   Do " you have difficulty bathing yourself, getting dressed or grooming yourself? No   Do you have difficulty using the toilet? No   Do you have difficulty moving around from place to place? No   Do you have trouble with steps or getting out of a bed or a chair? No   Current Diet Well Balanced Diet   Dental Exam Up to date   Eye Exam Up to date   Exercise (times per week) 3 times per week   Current Exercises Include Walking   Do you need help using the phone?  No   Are you deaf or do you have serious difficulty hearing?  No   Do you need help with transportation? No   Do you need help shopping? No   Do you need help preparing meals?  No   Do you need help with housework?  No   Do you need help with laundry? No   Do you need help taking your medications? No   Do you ever drive or ride in a car without wearing a seat belt? No   Have you felt unusual stress, anger or loneliness in the last month? No   Who do you live with? Spouse   If you need help, do you have trouble finding someone available to you? No   Have you been bothered in the last four weeks by sexual problems? No   Do you have difficulty concentrating, remembering or making decisions? No       Age-appropriate Screening Schedule:  Refer to the list below for future screening recommendations based on patient's age, sex and/or medical conditions. Orders for these recommended tests are listed in the plan section. The patient has been provided with a written plan.    Health Maintenance   Topic Date Due   • TDAP/TD VACCINES (1 - Tdap) Never done   • DIABETIC FOOT EXAM  Never done   • DXA SCAN  12/16/2021   • ZOSTER VACCINE (2 of 2) 05/20/2022 (Originally 2/5/2015)   • INFLUENZA VACCINE  08/01/2022   • HEMOGLOBIN A1C  09/30/2022   • MAMMOGRAM  01/20/2023   • LIPID PANEL  03/31/2023   • DIABETIC EYE EXAM  03/31/2023   • URINE MICROALBUMIN  03/31/2023              Assessment/Plan   CMS Preventative Services Quick Reference  Risk Factors Identified During  Encounter  Cardiovascular Disease  Depression/Dysphoria  Fall Risk-High or Moderate  The above risks/problems have been discussed with the patient.  Follow up actions/plans if indicated are seen below in the Assessment/Plan Section.  Pertinent information has been shared with the patient in the After Visit Summary.    Diagnoses and all orders for this visit:    1. Medicare annual wellness visit, subsequent (Primary)    2. Generalized anxiety disorder  -     citalopram (CeleXA) 10 MG tablet; Take 1 tablet by mouth Daily.  Dispense: 90 tablet; Refill: 1    3. Mixed hyperlipidemia  -     CBC & Differential; Future  -     Comprehensive Metabolic Panel; Future  -     TSH; Future  -     Lipid Panel; Future    4. Type 2 diabetes mellitus without complication, without long-term current use of insulin (HCC)  -     CBC & Differential; Future  -     Comprehensive Metabolic Panel; Future  -     TSH; Future  -     Hemoglobin A1c; Future  -     MicroAlbumin, Urine, Random - Urine, Clean Catch; Future    5. Essential hypertension  -     CBC & Differential; Future  -     Comprehensive Metabolic Panel; Future  -     TSH; Future    6. Gastroesophageal reflux disease without esophagitis  -     pantoprazole (PROTONIX) 40 MG EC tablet; Take 1 tablet by mouth Every Morning.  Dispense: 90 tablet; Refill: 1    7. Screen for colon cancer  -     Cancel: Cologuard - Stool, Per Rectum; Future  -     Cologuard - Stool, Per Rectum; Future        Follow Up:   Return in about 6 months (around 10/4/2022).     An After Visit Summary and PPPS were made available to the patient.               Chief Complaint  Medicare Wellness-subsequent    Subjective    History of Present Illness    Loretta Ren presents for Annual Wellness Visit as well as for follow-up on chronic medical problems including diabetes, hypertension, hyperlipidemia, depression and GERD.   She is doing well with her Celexa over the Trintellix.  She did like Trintellix though the  cost was too much.  No suicidal thoughts or hallucinations noted.  She follows up with urology soon for the kidney stones.  She did pull her last stent herself.  Result Review :     Common labs    Common Labsle 9/27/21 9/27/21 9/27/21 9/27/21 9/27/21 2/17/22 2/17/22 3/31/22 3/31/22 3/31/22 3/31/22 3/31/22    0850 0850 0850 0850 0850 1507 1507 0924 0924 0924 0924 0924   Glucose 142 (A)      127 (A)   138 (A)     BUN 12      9   10     Creatinine 0.49 (A)      0.68   0.64     eGFR Non African Am 126      86        Sodium 139      139   137     Potassium 4.8      4.3   4.5     Chloride 103      99   104     Calcium 9.9      9.7   9.4     Albumin 4.80      4.60   4.30     Total Bilirubin 0.6      0.4   0.7     Alkaline Phosphatase 100      111   94     AST (SGOT) 24      19   24     ALT (SGPT) 27      30   30     WBC   6.52   11.36 (A)  6.93       Hemoglobin   15.8   15.5  14.8       Hematocrit   46.2   45.5  42.8       Platelets   234   217  224       Total Cholesterol  196         181    Triglycerides  151 (A)         111    HDL Cholesterol  41         41    LDL Cholesterol   128 (A)         120 (A)    Hemoglobin A1C     7.10 (A)    7.50 (A)      Microalbumin, Urine    15.1        <1.2   (A) Abnormal value                       Follow-up in 6 months for labs and appt. Call with any concerns or questions that you may have regarding your medications or history.    I have reviewed all medications and at this time no medications changes need to be adjusted for all chronic conditions.

## 2022-04-04 NOTE — PATIENT INSTRUCTIONS
"Critical care medicine: Principles of diagnosis and management in the adult (4th ed., pp. 2922-9451). Thacker.\"> Berman's anesthesia (8th ed., pp. 232-250). Thacker.\">   Advance Directive    Advance directives are legal documents that allow you to make decisions about your health care and medical treatment in case you become unable to communicate for yourself. Advance directives let your wishes be known to family, friends, and health care providers.  Discussing and writing advance directives should happen over time rather than all at once. Advance directives can be changed and updated at any time. There are different types of advance directives, such as:  Medical power of .  Living will.  Do not resuscitate (DNR) order or do not attempt resuscitation (DNAR) order.  Health care proxy and medical power of   A health care proxy is also called a health care agent. This person is appointed to make medical decisions for you when you are unable to make decisions for yourself. Generally, people ask a trusted friend or family member to act as their proxy and represent their preferences. Make sure you have an agreement with your trusted person to act as your proxy. A proxy may have to make a medical decision on your behalf if your wishes are not known.  A medical power of , also called a durable power of  for health care, is a legal document that names your health care proxy. Depending on the laws in your state, the document may need to be:  Signed.  Notarized.  Dated.  Copied.  Witnessed.  Incorporated into your medical record.  You may also want to appoint a trusted person to manage your money in the event you are unable to do so. This is called a durable power of  for finances. It is a separate legal document from the durable power of  for health care. You may choose your health care proxy or someone different to act as your agent in money matters.  If you do not appoint a " proxy, or there is a concern that the proxy is not acting in your best interest, a court may appoint a guardian to act on your behalf.  Living will  A living will is a set of instructions that state your wishes about medical care when you cannot express them yourself. Health care providers should keep a copy of your living will in your medical record. You may want to give a copy to family members or friends. To alert caregivers in case of an emergency, you can place a card in your wallet to let them know that you have a living will and where they can find it. A living will is used if you become:  Terminally ill.  Disabled.  Unable to communicate or make decisions.  The following decisions should be included in your living will:  To use or not to use life support equipment, such as dialysis machines and breathing machines (ventilators).  Whether you want a DNR or DNAR order. This tells health care providers not to use cardiopulmonary resuscitation (CPR) if breathing or heartbeat stops.  To use or not to use tube feeding.  To be given or not to be given food and fluids.  Whether you want comfort (palliative) care when the goal becomes comfort rather than a cure.  Whether you want to donate your organs and tissues.  A living will does not give instructions for distributing your money and property if you should pass away.  DNR or DNAR  A DNR or DNAR order is a request not to have CPR in the event that your heart stops beating or you stop breathing. If a DNR or DNAR order has not been made and shared, a health care provider will try to help any patient whose heart has stopped or who has stopped breathing. If you plan to have surgery, talk with your health care provider about how your DNR or DNAR order will be followed if problems occur.  What if I do not have an advance directive?  Some states assign family decision makers to act on your behalf if you do not have an advance directive. Each state has its own laws about  advance directives. You may want to check with your health care provider, , or state representative about the laws in your state.  Summary  Advance directives are legal documents that allow you to make decisions about your health care and medical treatment in case you become unable to communicate for yourself.  The process of discussing and writing advance directives should happen over time. You can change and update advance directives at any time.  Advance directives may include a medical power of , a living will, and a DNR or DNAR order.  This information is not intended to replace advice given to you by your health care provider. Make sure you discuss any questions you have with your health care provider.  Document Revised: 09/21/2021 Document Reviewed: 09/21/2021  Elsevier Patient Education © 2021 [employer]

## 2022-04-09 ENCOUNTER — HOSPITAL ENCOUNTER (OUTPATIENT)
Dept: MAMMOGRAPHY | Facility: HOSPITAL | Age: 67
Discharge: HOME | End: 2022-04-09

## 2022-04-09 ENCOUNTER — HOSPITAL ENCOUNTER (OUTPATIENT)
Dept: BONE DENSITY | Facility: HOSPITAL | Age: 67
Discharge: HOME | End: 2022-04-09

## 2022-04-09 DIAGNOSIS — Z78.0 POSTMENOPAUSAL: ICD-10-CM

## 2022-04-09 DIAGNOSIS — Z12.31 SCREENING MAMMOGRAM FOR BREAST CANCER: ICD-10-CM

## 2022-04-09 PROCEDURE — 77063 BREAST TOMOSYNTHESIS BI: CPT

## 2022-04-09 PROCEDURE — 77080 DXA BONE DENSITY AXIAL: CPT

## 2022-04-09 PROCEDURE — 77067 SCR MAMMO BI INCL CAD: CPT

## 2022-04-13 ENCOUNTER — OFFICE VISIT (OUTPATIENT)
Dept: UROLOGY | Facility: CLINIC | Age: 67
End: 2022-04-13

## 2022-04-13 VITALS — RESPIRATION RATE: 16 BRPM | BODY MASS INDEX: 26.62 KG/M2 | HEIGHT: 61 IN | HEART RATE: 89 BPM | WEIGHT: 141 LBS

## 2022-04-13 DIAGNOSIS — Z96.0 S/P CYSTOSCOPY WITH URETERAL STENT PLACEMENT: Primary | ICD-10-CM

## 2022-04-13 DIAGNOSIS — N20.1 URETERAL STONE: ICD-10-CM

## 2022-04-13 PROCEDURE — 99212 OFFICE O/P EST SF 10 MIN: CPT | Performed by: NURSE PRACTITIONER

## 2022-04-14 NOTE — PROGRESS NOTES
Chief Complaint: Follow-up    Subjective      Follow-up visit        History of Present Illness  Loretta Ren is a 67 y.o. female presents to [facility] for a follow-up visit.     Patient underwent a cystoscopy with right ureteroscopy, stone basket extraction and right urethral stent insertion on 3/2/2022 performed by Dr. Deacon Rosales.    Patient reports that she is feeling much better since surgery.    She denies any urinary frequency, urgency or dysuria.    Denies any gross hematuria.    Denies any flank or abdominal pain.    Admits to voiding well without difficulty.      3/2/22;Study Result    Narrative & Impression   PROCEDURE:  US RENAL BILATERAL     COMPARISON: None     INDICATIONS:  Nephrolithiasis     FINDINGS:          Grayscale and Doppler imaging kidneys and bladder.     Right kidney measures 11 cm in length.  Unremarkable urinary bladder.  Left kidney measures 10.9   cm.  No hydronephrosis.       IMPRESSION:               Negative renal ultrasound               STEVE MENDOZA MD                Objective     Past Medical History:   Diagnosis Date   • Abnormal CT of the chest 04/29/2019   • Allergic rhinitis    • Allergic rhinitis due to allergen 11/06/2018   • Anxiety 11/06/2017    IMPROVED SINCE STARTING TRINTELLIX   • Anxiety disorder 11/06/2018   • Diabetes mellitus (HCC) 11/06/2018    DIET CONTROLLED NO MEDS   • Dizziness 11/06/2017    THE PATIENT REPORTS A TRANSIENT BOUT OF DIZZINESS THAT SHE ATTRIBUTES TO STRESS. HER LAST BOUT OF DIZZINESS WAS IN JULY 2017.  I DID REVIEW HER CDS WHICH WERE UNREVEALING. I WILL REQUEST RECORES TO BE SENT TO REVIEW   • DM2 (diabetes mellitus, type 2) ([facility])    • Duodenal ulcer    • Facial pain 11/06/2017    THE PATIENT REPORTS A TRANSIENT BOUT OF FACIAL PAIN WITH RADIATION TOEARDS THE RIGHT EAR. SHE NOTES THAT THIS HAS RESOLVED. I DID PERSOANLLY REVIEW HER CT FACE WHICH WAS ESSENTIALLY UNREVELAING   • Fibrosis lung (HCC) 11/06/2018   •  Generalized anxiety disorder 04/29/2019   • GERD (gastroesophageal reflux disease) 11/06/2018   • Heart murmur     CONTROLLED   • Hemorrhoids    • Hyperlipidemia 11/06/2018   • Mitral valve disorder    • Osteopenia 04/29/2019   • PONV (postoperative nausea and vomiting)    • PVC (premature ventricular contraction) 8/4/2021   • Renal calculus or stone    • Sarcoidosis    • Sinus trouble    • Type 2 diabetes mellitus with hyperglycemia, without long-term current use of insulin ([facility]) 04/29/2019       Past Surgical History:   Procedure Laterality Date   • CHOLECYSTECTOMY     • COLONOSCOPY  2008 cologard 2018   • CYSTOSCOPY     • CYSTOSCOPY URETEROSCOPY Right 3/2/2022    Procedure: CYSTOSCOPY, RIGHT URETEROSCOPY, STONE BASKET EXTRACTION AND RIGHT URETERAL STENT INSERTION;  Surgeon: Deacon Rosales MD;  Location: [facility] OR;  Service: Urology;  Laterality: Right;   • CYSTOSCOPY W/ URETERAL STENT PLACEMENT Right 2/18/2022    Procedure: Cystoscopy  with right ureteral stent placement;  Surgeon: Deacon Rosales MD;  Location: [facility] OR;  Service: Urology;  Laterality: Right;   • ENDOSCOPY     • LUNG BIOPSY  1992    SARCOIDOSIS, NO CANCER   • URETEROSCOPY LASER LITHOTRIPSY WITH STENT INSERTION         Outpatient Medications Marked as Taking for the 4/13/22 encounter (Office Visit) with Kendra Traore APRN   Medication Sig Dispense Refill   • Carboxymethylcellulose Sodium (REFRESH TEARS OP) Apply  to eye(s) as directed by provider.     • carvedilol (COREG) 3.125 MG tablet Take 1 tablet by mouth 2 (Two) Times a Day. 90 tablet 3   • cholecalciferol (VITAMIN D3) 10 MCG (400 UNIT) tablet Vitamin D3 10 mcg (400 unit) oral tablet take 1 tablet by oral route daily   Active     • citalopram (CeleXA) 10 MG tablet Take 1 tablet by mouth Daily. 90 tablet 1   • Cobalamin Combinations (B-12) 100-5000 MCG sublingual tablet B12 5,000-100 mcg sublingual lozenge dissolve 1 lozenge by sublingual route daily   Suspended     •  "fluorometholone (Flarex) 0.1 % ophthalmic suspension 1 drop 4 (Four) Times a Day.     • fluticasone (FLONASE) 50 MCG/ACT nasal spray 2 sprays into the nostril(s) as directed by provider Daily As Needed.     • loratadine (CLARITIN) 10 MG tablet Take 10 mg by mouth Daily As Needed.     • MegaRed Omega-3 Krill Oil 350 MG capsule MegaRed Omega-3 Krill Oil oral Taking QD   Active     • pantoprazole (PROTONIX) 40 MG EC tablet Take 1 tablet by mouth Every Morning. 90 tablet 1   • Trypsin-Castor Oil-[place] Balsam (OPTASE EX) Apply  topically.         Allergies   Allergen Reactions   • Aspirin Swelling   • Ceftriaxone Rash        Family History   Problem Relation Age of Onset   • Breast cancer Mother 80   • Heart disease Mother    • Kidney cancer Father 78        bladder   • Heart disease Daughter    • Malig Hyperthermia Neg Hx        Social History     Socioeconomic History   • Marital status:    Tobacco Use   • Smoking status: Never Smoker   • Smokeless tobacco: Never Used   Vaping Use   • Vaping Use: Never used   Substance and Sexual Activity   • Alcohol use: Never   • Drug use: Never   • Sexual activity: Defer     Partners: Male     Birth control/protection: Post-menopausal       Review of Systems   Constitutional: Negative for chills and fever.   Genitourinary: Negative for breast discharge, breast lump, breast pain, decreased libido, decreased urine volume, difficulty urinating, dyspareunia, dysuria, flank pain, frequency, genital sores, hematuria, menstrual problem, pelvic pain, pelvic pressure, urgency, urinary incontinence, vaginal bleeding, vaginal discharge and vaginal pain.        Vital Signs:   Pulse 89   Resp 16   Ht 154.9 cm (61\")   Wt 64 kg (141 lb)   BMI 26.64 kg/m²      Physical Exam  Constitutional:       Appearance: Normal appearance.   HENT:      Head: Normocephalic.   Cardiovascular:      Rate and Rhythm: Normal rate.   Pulmonary:      Effort: Pulmonary effort is normal.   Abdominal:      " General: Abdomen is flat.      Palpations: Abdomen is soft.      Tenderness: There is no right CVA tenderness or left CVA tenderness.   Skin:     General: Skin is warm and dry.   Neurological:      General: No focal deficit present.      Mental Status: She is alert and oriented to person, place, and time.   Psychiatric:         Mood and Affect: Mood normal.         Thought Content: Thought content normal.          Result Review :{Labs  Result Review  Imaging  Med Tab  Media :23}          []  Laboratory  [x]  Radiology  []  Pathology  []  Microbiology  []  EKG/Telemetry   []  Cardiology/Vascular   [x]  Old records  Have reviewed Dr. Deacon Rosales previous operative report along with renal ultrasound.     Assessment and Plan    Diagnoses and all orders for this visit:    1. S/P cystoscopy with ureteral stent placement (Primary)    2. Ureteral stone        Follow Up   Return if symptoms worsen or fail to improve.     ncrease fluid intake up to 2 liters/day.    Avoid high oxalate foods; rubarb, spinach, beets, chocolate, tea and meats.    Seek medical emergency services:  If unable to void greater than 24hrs  Fever greater than 101 associated with chills.  Extreme pain.    Patient verbalizes understanding is willing to proceed with the above plan.    Patient was given instructions and counseling regarding her condition or for health maintenance advice. Please see specific information pulled into the AVS if appropriate.

## 2022-04-20 ENCOUNTER — TELEPHONE (OUTPATIENT)
Dept: FAMILY MEDICINE CLINIC | Facility: CLINIC | Age: 67
End: 2022-04-20

## 2022-04-20 NOTE — TELEPHONE ENCOUNTER
Patient having dizzy spells, fells like going to pass out at times, after taking celexa. No other problems with med. Has been taking 5 weeks.

## 2022-04-20 NOTE — TELEPHONE ENCOUNTER
Patient aware to take with food. She said she has taking after she eats does not make a difference.

## 2022-04-20 NOTE — TELEPHONE ENCOUNTER
Patient said she takes celexa, carvedilol  & pantoprazole before 8:00 am on empty stomach, then she eats breakfast shortly after.

## 2022-05-19 ENCOUNTER — OFFICE VISIT (OUTPATIENT)
Dept: FAMILY MEDICINE CLINIC | Facility: CLINIC | Age: 67
End: 2022-05-19

## 2022-05-19 VITALS
BODY MASS INDEX: 25.98 KG/M2 | HEIGHT: 61 IN | TEMPERATURE: 98 F | DIASTOLIC BLOOD PRESSURE: 68 MMHG | OXYGEN SATURATION: 98 % | SYSTOLIC BLOOD PRESSURE: 125 MMHG | WEIGHT: 137.6 LBS | RESPIRATION RATE: 18 BRPM | HEART RATE: 76 BPM

## 2022-05-19 DIAGNOSIS — J35.1 SWELLING OF TONSIL: ICD-10-CM

## 2022-05-19 DIAGNOSIS — J35.8 TONSIL STONE: ICD-10-CM

## 2022-05-19 DIAGNOSIS — J02.9 SORE THROAT: Primary | ICD-10-CM

## 2022-05-19 LAB
EXPIRATION DATE: NORMAL
INTERNAL CONTROL: NORMAL
Lab: NORMAL
S PYO AG THROAT QL: NEGATIVE

## 2022-05-19 PROCEDURE — 87880 STREP A ASSAY W/OPTIC: CPT

## 2022-05-19 PROCEDURE — 99214 OFFICE O/P EST MOD 30 MIN: CPT

## 2022-05-19 RX ORDER — METHYLPREDNISOLONE 4 MG/1
TABLET ORAL
Qty: 1 EACH | Refills: 0 | Status: SHIPPED | OUTPATIENT
Start: 2022-05-19 | End: 2022-08-10

## 2022-05-19 NOTE — PROGRESS NOTES
Chief Complaint   Patient presents with   • Sore Throat     X2 days       Subjective          Loretta Ren presents to [facility]    She is here for for an acute visit.     She is having a sore throat and states she has white blisters in her throat. Her whole family has the same thing but was all negative for strep. She is also negative for strep on rapid test. She states that her family members were told it was allergies.     She does have swollen tonsils on both sides. I believe the white patches are tonsil stones. She states it is hard to swallow at times because of the pain.       Past History:  Medical History: has a past medical history of Abnormal CT of the chest (04/29/2019), Allergic rhinitis, Allergic rhinitis due to allergen (11/06/2018), Anxiety (11/06/2017), Anxiety disorder (11/06/2018), Diabetes mellitus ([facility]) (11/06/2018), Dizziness (11/06/2017), DM2 (diabetes mellitus, type 2) ([facility]), Duodenal ulcer, Facial pain (11/06/2017), Fibrosis lung ([facility]) (11/06/2018), Generalized anxiety disorder (04/29/2019), GERD (gastroesophageal reflux disease) (11/06/2018), Heart murmur, Hemorrhoids, Hyperlipidemia (11/06/2018), Mitral valve disorder, Osteopenia (04/29/2019), PONV (postoperative nausea and vomiting), PVC (premature ventricular contraction) (8/4/2021), Renal calculus or stone, Sarcoidosis, Sinus trouble, and Type 2 diabetes mellitus with hyperglycemia, without long-term current use of insulin ([facility]) (04/29/2019).   Surgical History: has a past surgical history that includes Cholecystectomy; Colonoscopy (2008); Cystoscopy; Lung biopsy (1992); Cystoscopy w/ ureteral stent placement (Right, 2/18/2022); ureteroscopy laser lithotripsy with stent insertion; Esophagogastroduodenoscopy; and cystoscopy ureteroscopy (Right, 3/2/2022).   Family History: family history includes Breast cancer (age of onset: 80) in her mother; Heart disease in her daughter and mother; Kidney  "cancer (age of onset: 78) in her father.   Social History: reports that she has never smoked. She has never used smokeless tobacco. She reports that she does not drink alcohol and does not use drugs.  Allergies: Aspirin and Ceftriaxone  (Not in a hospital admission)       Social History     Socioeconomic History   • Marital status:    Tobacco Use   • Smoking status: Never Smoker   • Smokeless tobacco: Never Used   Vaping Use   • Vaping Use: Never used   Substance and Sexual Activity   • Alcohol use: Never   • Drug use: Never   • Sexual activity: Defer     Partners: Male     Birth control/protection: Post-menopausal       Health Maintenance Due   Topic Date Due   • COLORECTAL CANCER SCREENING  Never done       Objective     Vital Signs:   /68   Pulse 76   Temp 98 °F (36.7 °C)   Resp 18   Ht 154.9 cm (61\")   Wt 62.4 kg (137 lb 9.6 oz)   SpO2 98%   BMI 26.00 kg/m²       Physical Exam  Constitutional:       Appearance: Normal appearance.   HENT:      Nose: Nose normal.      Mouth/Throat:      Mouth: Mucous membranes are moist.      Pharynx: Pharyngeal swelling present.      Tonsils: 2+ on the right. 2+ on the left.        Comments: Swelling and erythema of the tonsils  Cardiovascular:      Rate and Rhythm: Normal rate and regular rhythm.   Pulmonary:      Effort: Pulmonary effort is normal.      Breath sounds: Normal breath sounds.   Skin:     General: Skin is warm and dry.   Neurological:      General: No focal deficit present.      Mental Status: She is alert and oriented to person, place, and time.   Psychiatric:         Mood and Affect: Mood normal.         Behavior: Behavior normal.          Review of Systems   HENT: Positive for sore throat.         Result Review :                 Assessment and Plan    Diagnoses and all orders for this visit:    1. Sore throat (Primary)  -     POCT rapid strep A  -     phenol (CHLORASEPTIC) 1.4 % liquid liquid; Apply 1 spray to the mouth or throat Every 2 " (Two) Hours As Needed (sore throat) for up to 5 days.  Dispense: 60 mL; Refill: 0    2. Tonsil stone  Comments:  Instructed to use salt water gargles to try to loosen    3. Swelling of tonsil  Comments:  2+ EDGARD.   Medrol dose pack.  Orders:  -     methylPREDNISolone (MEDROL) 4 MG dose pack; Take as directed on package instructions.  Dispense: 1 each; Refill: 0          Pt thought to be clinically stable at this time.    Follow Up   Return if symptoms worsen or fail to improve.  Patient was given instructions and counseling regarding her condition or for health maintenance advice. Please see specific information pulled into the AVS if appropriate.

## 2022-08-10 ENCOUNTER — OFFICE VISIT (OUTPATIENT)
Dept: CARDIOLOGY | Facility: CLINIC | Age: 67
End: 2022-08-10

## 2022-08-10 VITALS
DIASTOLIC BLOOD PRESSURE: 77 MMHG | SYSTOLIC BLOOD PRESSURE: 129 MMHG | WEIGHT: 139 LBS | HEART RATE: 77 BPM | HEIGHT: 61 IN | BODY MASS INDEX: 26.24 KG/M2

## 2022-08-10 DIAGNOSIS — I10 ESSENTIAL HYPERTENSION: ICD-10-CM

## 2022-08-10 DIAGNOSIS — E78.2 MIXED HYPERLIPIDEMIA: ICD-10-CM

## 2022-08-10 DIAGNOSIS — I49.3 PVC (PREMATURE VENTRICULAR CONTRACTION): Primary | ICD-10-CM

## 2022-08-10 PROBLEM — R01.1 HEART MURMUR: Status: RESOLVED | Noted: 2021-09-22 | Resolved: 2022-08-10

## 2022-08-10 PROCEDURE — 93000 ELECTROCARDIOGRAM COMPLETE: CPT | Performed by: NURSE PRACTITIONER

## 2022-08-10 PROCEDURE — 99214 OFFICE O/P EST MOD 30 MIN: CPT | Performed by: NURSE PRACTITIONER

## 2022-08-10 RX ORDER — CARVEDILOL 3.12 MG/1
3.12 TABLET ORAL 2 TIMES DAILY
Qty: 90 TABLET | Refills: 3 | Status: SHIPPED | OUTPATIENT
Start: 2022-08-10 | End: 2023-02-21

## 2022-08-10 NOTE — PROGRESS NOTES
"Chief Complaint  Hypertension and PVC    Subjective            History of Present Illness  Loretta Ren is a 67-year-old white/ female patient who presents to the office today for follow up. She has PVCs, hypertension, and hyperlipidemia. She reports compliance with her medications. She denies chest pain, shortness of breath, lightheadedness/dizziness, or edema. She admits that her palpitations from PVC \"come and go\" but are generally brief and mild in nature.     [facility]  Past Medical History:   Diagnosis Date   • Abnormal CT of the chest 04/29/2019   • Allergic rhinitis    • Allergic rhinitis due to allergen 11/06/2018   • Anxiety 11/06/2017    IMPROVED SINCE STARTING TRINTELLIX   • Anxiety disorder 11/06/2018   • Diabetes mellitus (HCC) 11/06/2018    DIET CONTROLLED NO MEDS   • Dizziness 11/06/2017    THE PATIENT REPORTS A TRANSIENT BOUT OF DIZZINESS THAT SHE ATTRIBUTES TO STRESS. HER LAST BOUT OF DIZZINESS WAS IN JULY 2017.  I DID REVIEW HER CDS WHICH WERE UNREVEALING. I WILL REQUEST RECORES TO BE SENT TO REVIEW   • DM2 (diabetes mellitus, type 2) ([facility])    • Duodenal ulcer    • Facial pain 11/06/2017    THE PATIENT REPORTS A TRANSIENT BOUT OF FACIAL PAIN WITH RADIATION TOEARDS THE RIGHT EAR. SHE NOTES THAT THIS HAS RESOLVED. I DID PERSOANLLY REVIEW HER CT FACE WHICH WAS ESSENTIALLY UNREVELAING   • Fibrosis lung ([facility]) 11/06/2018   • Generalized anxiety disorder 04/29/2019   • GERD (gastroesophageal reflux disease) 11/06/2018   • Heart murmur     CONTROLLED   • Hemorrhoids    • Hyperlipidemia 11/06/2018   • Mitral valve disorder    • Osteopenia 04/29/2019   • PONV (postoperative nausea and vomiting)    • PVC (premature ventricular contraction) 8/4/2021   • Renal calculus or stone    • Sarcoidosis    • Sinus trouble    • Type 2 diabetes mellitus with hyperglycemia, without long-term current use of insulin ([facility]) 04/29/2019         ALLERGY  Allergies   Allergen Reactions   • Aspirin Swelling   • " Ceftriaxone Rash          SURGICALHX  Past Surgical History:   Procedure Laterality Date   • CHOLECYSTECTOMY     • COLONOSCOPY  2008    cologard 2018   • CYSTOSCOPY     • CYSTOSCOPY URETEROSCOPY Right 3/2/2022    Procedure: CYSTOSCOPY, RIGHT URETEROSCOPY, STONE BASKET EXTRACTION AND RIGHT URETERAL STENT INSERTION;  Surgeon: Deacon Rosales MD;  Location: [facility] OR;  Service: Urology;  Laterality: Right;   • CYSTOSCOPY W/ URETERAL STENT PLACEMENT Right 2/18/2022    Procedure: Cystoscopy  with right ureteral stent placement;  Surgeon: Deacon Rosales MD;  Location: [facility] OR;  Service: Urology;  Laterality: Right;   • ENDOSCOPY     • LUNG BIOPSY  1992    SARCOIDOSIS, NO CANCER   • URETEROSCOPY LASER LITHOTRIPSY WITH STENT INSERTION            SOC  Social History     Socioeconomic History   • Marital status:    Tobacco Use   • Smoking status: Never Smoker   • Smokeless tobacco: Never Used   Vaping Use   • Vaping Use: Never used   Substance and Sexual Activity   • Alcohol use: Never   • Drug use: Never   • Sexual activity: Defer     Partners: Male     Birth control/protection: Post-menopausal         FAMHX  Family History   Problem Relation Age of Onset   • Breast cancer Mother 80   • Heart disease Mother    • Kidney cancer Father 78        bladder   • Heart disease Daughter    • Malig Hyperthermia Neg Hx           MEDSIGONLY  Current Outpatient Medications on File Prior to Visit   Medication Sig   • Carboxymethylcellulose Sodium (REFRESH TEARS OP) Apply  to eye(s) as directed by provider.   • carvedilol (COREG) 3.125 MG tablet Take 1 tablet by mouth 2 (Two) Times a Day.   • cholecalciferol (VITAMIN D3) 10 MCG (400 UNIT) tablet Vitamin D3 10 mcg (400 unit) oral tablet take 1 tablet by oral route daily   Active   • citalopram (CeleXA) 10 MG tablet Take 1 tablet by mouth Daily.   • Cobalamin Combinations (B-12) 100-5000 MCG sublingual tablet B12 5,000-100 mcg sublingual lozenge dissolve 1 lozenge by  "sublingual route daily   Suspended   • fluorometholone (Flarex) 0.1 % ophthalmic suspension 1 drop 4 (Four) Times a Day.   • fluticasone (FLONASE) 50 MCG/ACT nasal spray 2 sprays into the nostril(s) as directed by provider Daily As Needed.   • loratadine (CLARITIN) 10 MG tablet Take 10 mg by mouth Daily As Needed.   • MegaRed Omega-3 Krill Oil 350 MG capsule MegaRed Omega-3 Krill Oil oral Taking QD   Active   • pantoprazole (PROTONIX) 40 MG EC tablet Take 1 tablet by mouth Every Morning.   • [DISCONTINUED] methylPREDNISolone (MEDROL) 4 MG dose pack Take as directed on package instructions.     No current facility-administered medications on file prior to visit.       Objective   /77   Pulse 77   Ht 154.9 cm (61\")   Wt 63 kg (139 lb)   BMI 26.26 kg/m²       Physical Exam  HENT:      Head: Normocephalic.   Neck:      Vascular: No carotid bruit.   Cardiovascular:      Rate and Rhythm: Normal rate and regular rhythm.      Pulses: Normal pulses.      Heart sounds: Normal heart sounds. No murmur heard.  Pulmonary:      Effort: Pulmonary effort is normal.      Breath sounds: Normal breath sounds.   Musculoskeletal:      Cervical back: Neck supple.      Right lower leg: No edema.      Left lower leg: No edema.   Skin:     General: Skin is dry.      Capillary Refill: Capillary refill takes less than 2 seconds.   Neurological:      Mental Status: She is alert and oriented to person, place, and time.   Psychiatric:         Behavior: Behavior normal.       ECG 12 Lead    Date/Time: 8/10/2022 7:42 PM  Performed by: Pinky Thompson APRN  Authorized by: Jay Saucedo MD   Comparison: compared with previous ECG   Similar to previous ECG  Rhythm: sinus rhythm  Rate: normal  BPM: 81  Conduction: conduction normal  ST Segments: ST segments normal  T Waves: T waves normal  QRS axis: normal  Other: no other findings    Clinical impression: normal ECG            Result Review :   The following data was reviewed by: " KIAN Levy on 08/10/2022:  No results found for: PROBNP  CMP    CMP 3/31/22   Glucose 138 (A)   BUN 10   Creatinine 0.64   eGFR Non African Am    Sodium 137   Potassium 4.5   Chloride 104   Calcium 9.4   Albumin 4.30   Total Bilirubin 0.7   Alkaline Phosphatase 94   AST (SGOT) 24   ALT (SGPT) 30   (A) Abnormal value            CBC w/diff    CBC w/Diff 3/31/22   WBC 6.93   RBC 4.93   Hemoglobin 14.8   Hematocrit 42.8   MCV 86.8   MCH 30.0   MCHC 34.6   RDW 12.9   Platelets 224   Neutrophil Rel % 66.6   Immature Granulocyte Rel % 0.3   Lymphocyte Rel % 24.1   Monocyte Rel % 7.5   Eosinophil Rel % 1.4   Basophil Rel % 0.1   (A) Abnormal value             Lab Results   Component Value Date    TSH 1.560 03/31/2022      Lab Results   Component Value Date    FREET4 1.31 09/27/2021      No results found for: DDIMERQUANT  Magnesium   Date Value Ref Range Status   01/18/2019 2.13 1.60 - 2.30 mg/dL Final      No results found for: DIGOXIN   No results found for: TROPONINT        Lipid Panel    Lipid Panel 3/31/22   Total Cholesterol 181   Triglycerides 111   HDL Cholesterol 41   VLDL Cholesterol 20   LDL Cholesterol  120 (A)   LDL/HDL Ratio 2.87   (A) Abnormal value          The 10-year ASCVD risk score (Talmageyg CAREY Jr., et al., 2013) is: 21.8%    Values used to calculate the score:      Age: 67 years      Sex: Female      Is Non- : No      Diabetic: Yes      Tobacco smoker: No      Systolic Blood Pressure: 129 mmHg      Is BP treated: Yes      HDL Cholesterol: 41 mg/dL      Total Cholesterol: 181 mg/dL       Assessment and Plan    Diagnoses and all orders for this visit:    1. PVC (premature ventricular contraction) (Primary)  Symptomatically stable at this time. Continue carvedilol 3.125 mg twice daily. Obtain echocardiogram in one year to assess structural function of the heart.   -     Adult Transthoracic Echo Complete W/ Cont if Necessary Per Protocol; Future    2. Mixed hyperlipidemia  Last  lipid panel was 3/31/22 with LDL of 120 which is outside of her goal range. I explained to her the benefits she would receive from statin therapy. She wants to try to modify diet first, recommend repeat fasting lipid panel in 3 months.     3. Essential hypertension  Currently controlled and without adverse effects from medication, continue carvedilol 3.125 mg twice daily.    Other orders  -     carvedilol (COREG) 3.125 MG tablet; Take 1 tablet by mouth 2 (Two) Times a Day.  Dispense: 90 tablet; Refill: 3            Follow Up   Return in about 1 year (around 8/10/2023) for Follow up with Dr Saucedo.    Patient was given instructions and counseling regarding her condition or for health maintenance advice. Please see specific information pulled into the AVS if appropriate.     Loretta Ren  reports that she has never smoked. She has never used smokeless tobacco.           Pinky Thompson, APRIGGY  08/10/22  09:48 EDT    Dictated Utilizing Dragon Dictation

## 2022-10-04 ENCOUNTER — OFFICE VISIT (OUTPATIENT)
Dept: FAMILY MEDICINE CLINIC | Facility: CLINIC | Age: 67
End: 2022-10-04

## 2022-10-04 VITALS
TEMPERATURE: 98.5 F | SYSTOLIC BLOOD PRESSURE: 132 MMHG | HEIGHT: 61 IN | OXYGEN SATURATION: 94 % | WEIGHT: 136.6 LBS | BODY MASS INDEX: 25.79 KG/M2 | DIASTOLIC BLOOD PRESSURE: 72 MMHG | HEART RATE: 81 BPM

## 2022-10-04 DIAGNOSIS — E11.65 TYPE 2 DIABETES MELLITUS WITH HYPERGLYCEMIA, WITHOUT LONG-TERM CURRENT USE OF INSULIN: ICD-10-CM

## 2022-10-04 DIAGNOSIS — R05.9 COUGH, UNSPECIFIED TYPE: ICD-10-CM

## 2022-10-04 DIAGNOSIS — U07.1 COVID-19: Primary | ICD-10-CM

## 2022-10-04 DIAGNOSIS — J02.9 SORE THROAT: ICD-10-CM

## 2022-10-04 LAB
EXPIRATION DATE: ABNORMAL
EXPIRATION DATE: NORMAL
FLUAV AG UPPER RESP QL IA.RAPID: NOT DETECTED
FLUBV AG UPPER RESP QL IA.RAPID: NOT DETECTED
INTERNAL CONTROL: ABNORMAL
INTERNAL CONTROL: NORMAL
Lab: ABNORMAL
Lab: NORMAL
S PYO AG THROAT QL: NEGATIVE
SARS-COV-2 AG UPPER RESP QL IA.RAPID: DETECTED

## 2022-10-04 PROCEDURE — 99213 OFFICE O/P EST LOW 20 MIN: CPT | Performed by: NURSE PRACTITIONER

## 2022-10-04 PROCEDURE — 87428 SARSCOV & INF VIR A&B AG IA: CPT | Performed by: NURSE PRACTITIONER

## 2022-10-04 PROCEDURE — 87880 STREP A ASSAY W/OPTIC: CPT | Performed by: NURSE PRACTITIONER

## 2022-10-04 RX ORDER — BROMPHENIRAMINE MALEATE, PSEUDOEPHEDRINE HYDROCHLORIDE, AND DEXTROMETHORPHAN HYDROBROMIDE 2; 30; 10 MG/5ML; MG/5ML; MG/5ML
5 SYRUP ORAL 4 TIMES DAILY PRN
Qty: 200 ML | Refills: 1 | Status: SHIPPED | OUTPATIENT
Start: 2022-10-04

## 2022-10-04 RX ORDER — MULTIPLE VITAMINS W/ MINERALS TAB 9MG-400MCG
1 TAB ORAL DAILY
COMMUNITY

## 2022-10-04 RX ORDER — DEXAMETHASONE 2 MG/1
2 TABLET ORAL 2 TIMES DAILY WITH MEALS
Qty: 10 TABLET | Refills: 0 | Status: SHIPPED | OUTPATIENT
Start: 2022-10-04 | End: 2022-10-09

## 2022-10-04 NOTE — PROGRESS NOTES
"Chief Complaint  Cough (Started on 10/1/22), Nasal Congestion (Runny nosse), Sore Throat, and Earache (Right ear)    Subjective          Loretta Ren, 67 y.o. female presents to [facility]  History of Present Illness   She presents today for an acute visit.  She is a patient of KIAN Lyons.    She complains of chronically irritated throat this time of year but began coughing on Saturday.She states she has similar symptoms twice a year but this was not improving so she decided to come in to office.  Throat has been getting increasingly sore with occasional painful swallowing.  She has had a runny nose off and on over the past few weeks.   Denies Nausea, Vomiting, loss of smell or taste. Denies fevers. Has taken Dayquil the past two days for symptom relief.   No known exposures to COVID - In office rapid test + for COVID  Had Covid this past December. She states she received all her vaccinations and the booster. She reports only having very mild symptoms with COVID in December.     Noted to have diabetes on chart, patient denies diabetes.  Her last A1c was 7.5%.  She states she has been drinking a lot of Mountain Dew and does not believe she has diabetes.  Objective   Vital Signs:   /72   Pulse 81   Temp 98.5 °F (36.9 °C)   Ht 154.9 cm (61\")   Wt 62 kg (136 lb 9.6 oz)   SpO2 94%   BMI 25.81 kg/m²     Current Outpatient Medications on File Prior to Visit   Medication Sig Dispense Refill   • Carboxymethylcellulose Sodium (REFRESH TEARS OP) Apply  to eye(s) as directed by provider.     • carvedilol (COREG) 3.125 MG tablet Take 1 tablet by mouth 2 (Two) Times a Day. 90 tablet 3   • citalopram (CeleXA) 10 MG tablet Take 1 tablet by mouth Daily. 90 tablet 1   • fluorometholone (Flarex) 0.1 % ophthalmic suspension 1 drop 4 (Four) Times a Day.     • fluticasone (FLONASE) 50 MCG/ACT nasal spray 2 sprays into the nostril(s) as directed by provider Daily As Needed.   "   • loratadine (CLARITIN) 10 MG tablet Take 10 mg by mouth Daily As Needed.     • multivitamin with minerals tablet tablet Take 1 tablet by mouth Daily.     • pantoprazole (PROTONIX) 40 MG EC tablet Take 1 tablet by mouth Every Morning. 90 tablet 1   • [DISCONTINUED] cholecalciferol (VITAMIN D3) 10 MCG (400 UNIT) tablet Vitamin D3 10 mcg (400 unit) oral tablet take 1 tablet by oral route daily   Active     • [DISCONTINUED] Cobalamin Combinations (B-12) 100-5000 MCG sublingual tablet B12 5,000-100 mcg sublingual lozenge dissolve 1 lozenge by sublingual route daily   Suspended     • [DISCONTINUED] MegaRed Omega-3 Krill Oil 350 MG capsule MegaRed Omega-3 Krill Oil oral Taking QD   Active       No current facility-administered medications on file prior to visit.     Past Medical History:   Diagnosis Date   • Abnormal CT of the chest 04/29/2019   • Allergic rhinitis    • Allergic rhinitis due to allergen 11/06/2018   • Anxiety 11/06/2017    IMPROVED SINCE STARTING TRINTELLIX   • Anxiety disorder 11/06/2018   • Diabetes mellitus (HCC) 11/06/2018    DIET CONTROLLED NO MEDS   • Dizziness 11/06/2017    THE PATIENT REPORTS A TRANSIENT BOUT OF DIZZINESS THAT SHE ATTRIBUTES TO STRESS. HER LAST BOUT OF DIZZINESS WAS IN JULY 2017.  I DID REVIEW HER CDS WHICH WERE UNREVEALING. I WILL REQUEST RECORES TO BE SENT TO REVIEW   • DM2 (diabetes mellitus, type 2) ([facility])    • Duodenal ulcer    • Facial pain 11/06/2017    THE PATIENT REPORTS A TRANSIENT BOUT OF FACIAL PAIN WITH RADIATION TOEARDS THE RIGHT EAR. SHE NOTES THAT THIS HAS RESOLVED. I DID PERSOANLLY REVIEW HER CT FACE WHICH WAS ESSENTIALLY UNREVELAING   • Fibrosis lung (HCC) 11/06/2018   • Generalized anxiety disorder 04/29/2019   • GERD (gastroesophageal reflux disease) 11/06/2018   • Heart murmur     CONTROLLED   • Hemorrhoids    • Hyperlipidemia 11/06/2018   • Mitral valve disorder    • Osteopenia 04/29/2019   • PONV (postoperative nausea and vomiting)    • PVC (premature  ventricular contraction) 8/4/2021   • Renal calculus or stone    • Sarcoidosis    • Sinus trouble    • Type 2 diabetes mellitus with hyperglycemia, without long-term current use of insulin ([facility]) 04/29/2019      Physical Exam  Vitals and nursing note reviewed.   Constitutional:       Appearance: She is normal weight.   HENT:      Mouth/Throat:      Mouth: Mucous membranes are moist.      Pharynx: Oropharyngeal exudate and posterior oropharyngeal erythema present.   Cardiovascular:      Rate and Rhythm: Normal rate and regular rhythm.   Pulmonary:      Effort: Pulmonary effort is normal.      Breath sounds: Normal breath sounds.   Neurological:      Mental Status: She is alert and oriented to person, place, and time.   Psychiatric:         Behavior: Behavior is cooperative.        Result Review :     A1C Last 3 Results    HGBA1C Last 3 Results 3/31/22   Hemoglobin A1C 7.50 (A)   (A) Abnormal value                 POCT SARS-CoV-2 Antigen MEMO + Flu  Order: [id number]   Status: Final result     Visible to patient: No (scheduled for 10/5/2022 12:02 AM)     Dx: Cough, unspecified type    Specimen Information: Swab         0 Result Notes    Component   Ref Range & Units 09:57   (10/4/22) 09:55   (10/4/22) 4 mo ago   (5/19/22) 6 mo ago   (3/9/22) 7 mo ago   (2/7/22) 7 mo ago   (2/7/22)   SARS Antigen   Not Detected, Presumptive Negative Detected Abnormal          Influenza A Antigen MEMO   Not Detected Not Detected         Influenza B Antigen MEMO   Not Detected Not Detected         Internal Control   Passed Passed  Passed  Passed   Passed     Lot Number  [id number]  [id number]  [id number]  [id number]  [id number]  [id number]    Expiration Date  03/10/2023  11/30/2023  12/09/2023  12/31/2022  04/30/2023  07/31/2022    [facility]  [facility] LABORATORY [facility] LABORATORY [facility] LABORATORY [facility] LABORATORY [facility] LAB[facility]ATORY              Specimen Collected:  10/04/22 09:57 Last Resulted: 10/04/22 09:57                Assessment and Plan {CC Problem List  Visit Diagnosis   ROS  Review (Popup)  Health Maintenance  Quality  BestPractice  Medications  SmartSets  SnapShot Encounters  Media :23}   Diagnoses and all orders for this visit:    1. COVID-19 (Primary)  Comments:  Increase fluids and rest.  Follow CDC isolation and masking guidelines. Isolate for 5 days from onset of symptoms and mask for an additional 5 days  Orders:  -     brompheniramine-pseudoephedrine-DM 30-2-10 MG/5ML syrup; Take 5 mL by mouth 4 (Four) Times a Day As Needed for Congestion, Cough or Allergies.  Dispense: 200 mL; Refill: 1  -     dexamethasone (DECADRON) 2 MG tablet; Take 1 tablet by mouth 2 (Two) Times a Day With Meals for 5 days.  Dispense: 10 tablet; Refill: 0    2. Cough, unspecified type  -     POCT SARS-CoV-2 Antigen MEMO + Flu  -     brompheniramine-pseudoephedrine-DM 30-2-10 MG/5ML syrup; Take 5 mL by mouth 4 (Four) Times a Day As Needed for Congestion, Cough or Allergies.  Dispense: 200 mL; Refill: 1  -     dexamethasone (DECADRON) 2 MG tablet; Take 1 tablet by mouth 2 (Two) Times a Day With Meals for 5 days.  Dispense: 10 tablet; Refill: 0    3. Sore throat  -     POCT rapid strep A    4. Type 2 diabetes mellitus with hyperglycemia, without long-term current use of insulin ([facility])  Assessment & Plan:  Most recent Hgb A1C 7.5%  Watch carbs and sugars. Monitor for signs and symptoms of elevated blood sugar with steroid use and check blood sugar if symptoms of elevated blood sugar are present.  She was advised to return for her labs after she has recovered from COVID test.        Follow Up   Return if symptoms worsen or fail to improve.  Patient was given instructions and counseling regarding her condition or for health maintenance advice. Please see specific information pulled into the AVS if appropriate.     I reviewed all the information by my student KIAN Box  student, and I attest that all information is correct.    Magaly Dill, APRN  10/04/2022

## 2022-10-04 NOTE — ASSESSMENT & PLAN NOTE
Most recent Hgb A1C 7.5%  Watch carbs and sugars. Monitor for signs and symptoms of elevated blood sugar with steroid use and check blood sugar if symptoms of elevated blood sugar are present.  She was advised to return for her labs after she has recovered from COVID test.

## 2022-10-13 ENCOUNTER — LAB (OUTPATIENT)
Dept: LAB | Facility: HOSPITAL | Age: 67
End: 2022-10-13

## 2022-10-13 DIAGNOSIS — E11.9 TYPE 2 DIABETES MELLITUS WITHOUT COMPLICATION, WITHOUT LONG-TERM CURRENT USE OF INSULIN: ICD-10-CM

## 2022-10-13 DIAGNOSIS — E78.2 MIXED HYPERLIPIDEMIA: ICD-10-CM

## 2022-10-13 DIAGNOSIS — I10 ESSENTIAL HYPERTENSION: ICD-10-CM

## 2022-10-13 LAB
ALBUMIN SERPL-MCNC: 4 G/DL (ref 3.5–5.2)
ALBUMIN UR-MCNC: <1.2 MG/DL
ALBUMIN/GLOB SERPL: 1.3 G/DL
ALP SERPL-CCNC: 75 U/L (ref 39–117)
ALT SERPL W P-5'-P-CCNC: 23 U/L (ref 1–33)
ANION GAP SERPL CALCULATED.3IONS-SCNC: 12.5 MMOL/L (ref 5–15)
AST SERPL-CCNC: 19 U/L (ref 1–32)
BASOPHILS # BLD AUTO: 0.02 10*3/MM3 (ref 0–0.2)
BASOPHILS NFR BLD AUTO: 0.2 % (ref 0–1.5)
BILIRUB SERPL-MCNC: 0.7 MG/DL (ref 0–1.2)
BUN SERPL-MCNC: 12 MG/DL (ref 8–23)
BUN/CREAT SERPL: 18.5 (ref 7–25)
CALCIUM SPEC-SCNC: 9 MG/DL (ref 8.6–10.5)
CHLORIDE SERPL-SCNC: 101 MMOL/L (ref 98–107)
CHOLEST SERPL-MCNC: 163 MG/DL (ref 0–200)
CO2 SERPL-SCNC: 22.5 MMOL/L (ref 22–29)
CREAT SERPL-MCNC: 0.65 MG/DL (ref 0.57–1)
DEPRECATED RDW RBC AUTO: 39.2 FL (ref 37–54)
EGFRCR SERPLBLD CKD-EPI 2021: 95.4 ML/MIN/1.73
EOSINOPHIL # BLD AUTO: 0.12 10*3/MM3 (ref 0–0.4)
EOSINOPHIL NFR BLD AUTO: 1.4 % (ref 0.3–6.2)
ERYTHROCYTE [DISTWIDTH] IN BLOOD BY AUTOMATED COUNT: 12.5 % (ref 12.3–15.4)
GLOBULIN UR ELPH-MCNC: 3 GM/DL
GLUCOSE SERPL-MCNC: 137 MG/DL (ref 65–99)
HBA1C MFR BLD: 7 % (ref 4.8–5.6)
HCT VFR BLD AUTO: 43.2 % (ref 34–46.6)
HDLC SERPL-MCNC: 41 MG/DL (ref 40–60)
HGB BLD-MCNC: 15.2 G/DL (ref 12–15.9)
IMM GRANULOCYTES # BLD AUTO: 0.04 10*3/MM3 (ref 0–0.05)
IMM GRANULOCYTES NFR BLD AUTO: 0.5 % (ref 0–0.5)
LDLC SERPL CALC-MCNC: 100 MG/DL (ref 0–100)
LDLC/HDLC SERPL: 2.38 {RATIO}
LYMPHOCYTES # BLD AUTO: 1.93 10*3/MM3 (ref 0.7–3.1)
LYMPHOCYTES NFR BLD AUTO: 22.3 % (ref 19.6–45.3)
MCH RBC QN AUTO: 30.5 PG (ref 26.6–33)
MCHC RBC AUTO-ENTMCNC: 35.2 G/DL (ref 31.5–35.7)
MCV RBC AUTO: 86.7 FL (ref 79–97)
MONOCYTES # BLD AUTO: 0.83 10*3/MM3 (ref 0.1–0.9)
MONOCYTES NFR BLD AUTO: 9.6 % (ref 5–12)
NEUTROPHILS NFR BLD AUTO: 5.73 10*3/MM3 (ref 1.7–7)
NEUTROPHILS NFR BLD AUTO: 66 % (ref 42.7–76)
NRBC BLD AUTO-RTO: 0 /100 WBC (ref 0–0.2)
PLATELET # BLD AUTO: 240 10*3/MM3 (ref 140–450)
PMV BLD AUTO: 11 FL (ref 6–12)
POTASSIUM SERPL-SCNC: 4.6 MMOL/L (ref 3.5–5.2)
PROT SERPL-MCNC: 7 G/DL (ref 6–8.5)
RBC # BLD AUTO: 4.98 10*6/MM3 (ref 3.77–5.28)
SODIUM SERPL-SCNC: 136 MMOL/L (ref 136–145)
TRIGL SERPL-MCNC: 123 MG/DL (ref 0–150)
TSH SERPL DL<=0.05 MIU/L-ACNC: 0.89 UIU/ML (ref 0.27–4.2)
VLDLC SERPL-MCNC: 22 MG/DL (ref 5–40)
WBC NRBC COR # BLD: 8.67 10*3/MM3 (ref 3.4–10.8)

## 2022-10-13 PROCEDURE — 85025 COMPLETE CBC W/AUTO DIFF WBC: CPT

## 2022-10-13 PROCEDURE — 36415 COLL VENOUS BLD VENIPUNCTURE: CPT

## 2022-10-13 PROCEDURE — 80061 LIPID PANEL: CPT

## 2022-10-13 PROCEDURE — 82043 UR ALBUMIN QUANTITATIVE: CPT

## 2022-10-13 PROCEDURE — 80053 COMPREHEN METABOLIC PANEL: CPT

## 2022-10-13 PROCEDURE — 84443 ASSAY THYROID STIM HORMONE: CPT

## 2022-10-13 PROCEDURE — 83036 HEMOGLOBIN GLYCOSYLATED A1C: CPT

## 2022-10-17 ENCOUNTER — OFFICE VISIT (OUTPATIENT)
Dept: FAMILY MEDICINE CLINIC | Facility: CLINIC | Age: 67
End: 2022-10-17

## 2022-10-17 VITALS
TEMPERATURE: 97.8 F | OXYGEN SATURATION: 97 % | WEIGHT: 135.6 LBS | HEIGHT: 61 IN | SYSTOLIC BLOOD PRESSURE: 110 MMHG | RESPIRATION RATE: 20 BRPM | BODY MASS INDEX: 25.6 KG/M2 | DIASTOLIC BLOOD PRESSURE: 64 MMHG | HEART RATE: 86 BPM

## 2022-10-17 DIAGNOSIS — E11.9 TYPE 2 DIABETES MELLITUS WITHOUT COMPLICATION, WITHOUT LONG-TERM CURRENT USE OF INSULIN: Primary | ICD-10-CM

## 2022-10-17 DIAGNOSIS — K21.9 GASTROESOPHAGEAL REFLUX DISEASE WITHOUT ESOPHAGITIS: ICD-10-CM

## 2022-10-17 DIAGNOSIS — Z23 NEED FOR INFLUENZA VACCINATION: ICD-10-CM

## 2022-10-17 DIAGNOSIS — F41.1 GENERALIZED ANXIETY DISORDER: ICD-10-CM

## 2022-10-17 DIAGNOSIS — E78.2 MIXED HYPERLIPIDEMIA: ICD-10-CM

## 2022-10-17 PROCEDURE — G0008 ADMIN INFLUENZA VIRUS VAC: HCPCS | Performed by: NURSE PRACTITIONER

## 2022-10-17 PROCEDURE — 90662 IIV NO PRSV INCREASED AG IM: CPT | Performed by: NURSE PRACTITIONER

## 2022-10-17 PROCEDURE — 99214 OFFICE O/P EST MOD 30 MIN: CPT | Performed by: NURSE PRACTITIONER

## 2022-10-17 RX ORDER — CITALOPRAM 10 MG/1
10 TABLET ORAL DAILY
Qty: 90 TABLET | Refills: 1 | Status: SHIPPED | OUTPATIENT
Start: 2022-10-17

## 2022-10-17 NOTE — PROGRESS NOTES
Chief Complaint  Depression    Subjective          Loretta Ren presents to [facility]  History of Present Illness  She is still having a little bit of some sore throat and a little pressure on the right ear at times.  It comes and goes.  She has been taking Claritin and Flonase but wants to know if there is anything different she could possibly take for that and the sore throat.  She is doing wonderful with her Celexa.  No problems noted.  No suicidal thoughts hallucinations.  She does not take her Protonix daily just as needed.  She is currently seeing cardiology for her cardiac meds.      Allergies  Aspirin and Ceftriaxone    Social History     Tobacco Use   • Smoking status: Never   • Smokeless tobacco: Never   Vaping Use   • Vaping Use: Never used   Substance Use Topics   • Alcohol use: Never   • Drug use: Never       Family History   Problem Relation Age of Onset   • Breast cancer Mother 80   • Heart disease Mother    • Kidney cancer Father 78        bladder   • Heart disease Daughter    • Malig Hyperthermia Neg Hx         Health Maintenance Due   Topic Date Due   • ZOSTER VACCINE (2 of 3) 02/05/2015   • DIABETIC FOOT EXAM  Never done        Immunization History   Administered Date(s) Administered   • Flu Vaccine Quad PF >36MO 10/06/2015, 12/01/2016, 10/03/2017, 10/10/2019, 10/06/2020   • Fluzone High-Dose 65+yrs 09/30/2021, 10/17/2022   • Fluzone Quad >6mos (Multi-dose) 10/19/2018   • Influenza TIV (IM) 10/31/2014   • Influenza, Unspecified 10/06/2020   • PEDS-Pneumococcal Conjugate (PCV7) 10/31/2014   • Pneumococcal Polysaccharide (PPSV23) 10/31/2014   • Shingrix 12/11/2014   • Zostavax 12/11/2014       Review of Systems   Constitutional: Negative for fatigue.   Respiratory: Negative for cough and shortness of breath.    Cardiovascular: Negative for chest pain.   Gastrointestinal: Negative for diarrhea, nausea and vomiting.        Objective       Vitals:    10/17/22 1551  "  BP: 110/64   BP Location: Left arm   Patient Position: Sitting   Cuff Size: Adult   Pulse: 86   Resp: 20   Temp: 97.8 °F (36.6 °C)   TempSrc: Temporal   SpO2: 97%   Weight: 61.5 kg (135 lb 9.6 oz)   Height: 154.9 cm (61\")       Body mass index is 25.62 kg/m².         Physical Exam  Vitals reviewed.   Constitutional:       Appearance: Normal appearance. She is well-developed.   HENT:      Right Ear: Tympanic membrane and ear canal normal.      Left Ear: Tympanic membrane and ear canal normal.      Nose: Rhinorrhea present. No congestion.      Mouth/Throat:      Pharynx: Posterior oropharyngeal erythema present. No oropharyngeal exudate.   Eyes:      Conjunctiva/sclera: Conjunctivae normal.   Cardiovascular:      Rate and Rhythm: Normal rate and regular rhythm.      Heart sounds: Normal heart sounds. No murmur heard.  Pulmonary:      Effort: Pulmonary effort is normal.      Breath sounds: Normal breath sounds.   Neurological:      Mental Status: She is alert and oriented to person, place, and time.      Cranial Nerves: No cranial nerve deficit.      Motor: No weakness.   Psychiatric:         Mood and Affect: Mood and affect normal.             Result Review :     The following data was reviewed by: KIAN Lyons on 10/17/2022:    Common labs    Common Labs 2/17/22 2/17/22 3/31/22 3/31/22 3/31/22 3/31/22 3/31/22 10/13/22 10/13/22 10/13/22 10/13/22 10/13/22    1507 1507 0924 0924 0924 0924 0924 0908 0908 0908 [id number] 0908   Glucose  127 (A)   138 (A)       137 (A)   BUN  9   10       12   Creatinine  0.68   0.64       0.65   eGFR Non African Am  86             Sodium  139   137       136   Potassium  4.3   4.5       4.6   Chloride  99   104       101   Calcium  9.7   9.4       9.0   Albumin  4.60   4.30       4.00   Total Bilirubin  0.4   0.7       0.7   Alkaline Phosphatase  111   94       75   AST (SGOT)  19   24       19   ALT (SGPT)  30   30       23   WBC 11.36 (A)  6.93      8.67      Hemoglobin 15.5  " 14.8      15.2      Hematocrit 45.5  42.8      43.2      Platelets 217  224      240      Total Cholesterol      181     163    Triglycerides      111     123    HDL Cholesterol      41     41    LDL Cholesterol       120 (A)     100    Hemoglobin A1C    7.50 (A)    7.00 (A)       Microalbumin, Urine       <1.2   <1.2     (A) Abnormal value            Most Recent A1C    HGBA1C Most Recent 10/13/22   Hemoglobin A1C 7.00 (A)   (A) Abnormal value                           Assessment and Plan      Diagnoses and all orders for this visit:    1. Type 2 diabetes mellitus without complication, without long-term current use of insulin (HCC) (Primary)  -     CBC & Differential; Future  -     Comprehensive Metabolic Panel; Future  -     TSH; Future  -     Hemoglobin A1c; Future  -     MicroAlbumin, Urine, Random - Urine, Clean Catch; Future    2. Generalized anxiety disorder  -     citalopram (CeleXA) 10 MG tablet; Take 1 tablet by mouth Daily.  Dispense: 90 tablet; Refill: 1    3. Mixed hyperlipidemia  -     CBC & Differential; Future  -     Comprehensive Metabolic Panel; Future  -     Lipid Panel; Future    4. Gastroesophageal reflux disease without esophagitis    5. Need for influenza vaccination  -     Fluzone High-Dose 65+yrs            Follow Up     Return in about 6 months (around 4/17/2023) for Medicare Wellness 4/4/22.  Follow-up in 6 months for labs and appt. Call with any concerns or questions that you may have regarding your medications or history.    I have reviewed all medications and at this time no medications changes need to be adjusted for all chronic conditions.  I did check with Pinky Thompson in cardiology and she is okay with these labs.  Patient is aware via phone call that she does not need to get any labs done for cardiology as they will use mine.  Also for her allergies she will trial Allegra with the Flonase and we will go from there.  Patient was given instructions and counseling regarding her  condition or for health maintenance advice. Please see specific information pulled into the AVS if appropriate.         Natalie Mukherjee, APRN  10/17/2022

## 2022-10-21 DIAGNOSIS — K21.9 GASTROESOPHAGEAL REFLUX DISEASE WITHOUT ESOPHAGITIS: ICD-10-CM

## 2022-10-21 RX ORDER — PANTOPRAZOLE SODIUM 40 MG/1
TABLET, DELAYED RELEASE ORAL
Qty: 90 TABLET | Refills: 1 | Status: SHIPPED | OUTPATIENT
Start: 2022-10-21 | End: 2023-03-09 | Stop reason: SDDI

## 2022-10-31 ENCOUNTER — OFFICE VISIT (OUTPATIENT)
Dept: FAMILY MEDICINE CLINIC | Facility: CLINIC | Age: 67
End: 2022-10-31

## 2022-10-31 VITALS
HEIGHT: 61 IN | HEART RATE: 75 BPM | BODY MASS INDEX: 25.22 KG/M2 | SYSTOLIC BLOOD PRESSURE: 124 MMHG | DIASTOLIC BLOOD PRESSURE: 74 MMHG | WEIGHT: 133.6 LBS | TEMPERATURE: 96.3 F | OXYGEN SATURATION: 97 %

## 2022-10-31 DIAGNOSIS — J02.9 SORE THROAT: ICD-10-CM

## 2022-10-31 DIAGNOSIS — B37.0 ORAL CANDIDIASIS: Primary | ICD-10-CM

## 2022-10-31 LAB
EXPIRATION DATE: NORMAL
EXPIRATION DATE: NORMAL
FLUAV AG UPPER RESP QL IA.RAPID: NOT DETECTED
FLUBV AG UPPER RESP QL IA.RAPID: NOT DETECTED
INTERNAL CONTROL: NORMAL
INTERNAL CONTROL: NORMAL
Lab: NORMAL
Lab: NORMAL
S PYO AG THROAT QL: NEGATIVE
SARS-COV-2 AG UPPER RESP QL IA.RAPID: NOT DETECTED

## 2022-10-31 PROCEDURE — 87880 STREP A ASSAY W/OPTIC: CPT | Performed by: NURSE PRACTITIONER

## 2022-10-31 PROCEDURE — 87428 SARSCOV & INF VIR A&B AG IA: CPT | Performed by: NURSE PRACTITIONER

## 2022-10-31 PROCEDURE — 99213 OFFICE O/P EST LOW 20 MIN: CPT | Performed by: NURSE PRACTITIONER

## 2022-10-31 NOTE — PROGRESS NOTES
"Chief Complaint  Sore Throat (Started last week ) and Chills    Subjective          Loretta Ren, 67 y.o. female presents to [facility]  History of Present Illness   She presents today for an acute visit.  She is a patient of KIAN Lyons.  She is complaining of sore throat, cough and chills for about 1 week.  She states that her sore throat \"hurts a lot sometimes.\"  She describes her sore throat as a burning sensation.  She has been taking Claritin and using Flonase.  Rapid strep swab in office today is negative.  Rapid flu and COVID swabs are both negative.  Objective   Vital Signs:   /74 (BP Location: Left arm, Patient Position: Sitting, Cuff Size: Adult)   Pulse 75   Temp 96.3 °F (35.7 °C)   Ht 154.9 cm (61\")   Wt 60.6 kg (133 lb 9.6 oz)   SpO2 97%   BMI 25.24 kg/m²     Current Outpatient Medications on File Prior to Visit   Medication Sig Dispense Refill   • brompheniramine-pseudoephedrine-DM 30-2-10 MG/5ML syrup Take 5 mL by mouth 4 (Four) Times a Day As Needed for Congestion, Cough or Allergies. 200 mL 1   • Carboxymethylcellulose Sodium (REFRESH TEARS OP) Apply  to eye(s) as directed by provider.     • carvedilol (COREG) 3.125 MG tablet Take 1 tablet by mouth 2 (Two) Times a Day. 90 tablet 3   • citalopram (CeleXA) 10 MG tablet Take 1 tablet by mouth Daily. 90 tablet 1   • fluorometholone (Flarex) 0.1 % ophthalmic suspension 1 drop 4 (Four) Times a Day.     • fluticasone (FLONASE) 50 MCG/ACT nasal spray 2 sprays into the nostril(s) as directed by provider Daily As Needed.     • loratadine (CLARITIN) 10 MG tablet Take 10 mg by mouth Daily As Needed.     • multivitamin with minerals tablet tablet Take 1 tablet by mouth Daily.     • pantoprazole (PROTONIX) 40 MG EC tablet TAKE ONE TABLET BY MOUTH EVERY MORNING 90 tablet 1     No current facility-administered medications on file prior to visit.     Past Medical History:   Diagnosis Date   • Abnormal CT " of the chest 04/29/2019   • Allergic rhinitis    • Allergic rhinitis due to allergen 11/06/2018   • Anxiety 11/06/2017    IMPROVED SINCE STARTING TRINTELLIX   • Anxiety disorder 11/06/2018   • Diabetes mellitus (HCC) 11/06/2018    DIET CONTROLLED NO MEDS   • Dizziness 11/06/2017    THE PATIENT REPORTS A TRANSIENT BOUT OF DIZZINESS THAT SHE ATTRIBUTES TO STRESS. HER LAST BOUT OF DIZZINESS WAS IN JULY 2017.  I DID REVIEW HER CDS WHICH WERE UNREVEALING. I WILL REQUEST RECORES TO BE SENT TO REVIEW   • DM2 (diabetes mellitus, type 2) ([facility])    • Duodenal ulcer    • Facial pain 11/06/2017    THE PATIENT REPORTS A TRANSIENT BOUT OF FACIAL PAIN WITH RADIATION TOEARDS THE RIGHT EAR. SHE NOTES THAT THIS HAS RESOLVED. I DID PERSOANLLY REVIEW HER CT FACE WHICH WAS ESSENTIALLY UNREVELAING   • Fibrosis lung (HCC) 11/06/2018   • Generalized anxiety disorder 04/29/2019   • GERD (gastroesophageal reflux disease) 11/06/2018   • Heart murmur     CONTROLLED   • Hemorrhoids    • Hyperlipidemia 11/06/2018   • Mitral valve disorder    • Osteopenia 04/29/2019   • PONV (postoperative nausea and vomiting)    • PVC (premature ventricular contraction) 8/4/2021   • Renal calculus or stone    • Sarcoidosis    • Sinus trouble    • Type 2 diabetes mellitus with hyperglycemia, without long-term current use of insulin ([facility]) 04/29/2019      Physical Exam  Vitals reviewed.   Constitutional:       Appearance: Normal appearance. She is well-developed.   HENT:      Right Ear: Tympanic membrane, ear canal and external ear normal.      Left Ear: Tympanic membrane, ear canal and external ear normal.      Mouth/Throat:      Mouth: Mucous membranes are moist.      Pharynx: No pharyngeal swelling, oropharyngeal exudate or posterior oropharyngeal erythema.      Comments: Tongue is coated yellow and dry.  Neck:      Thyroid: No thyroid mass, thyromegaly or thyroid tenderness.   Cardiovascular:      Rate and Rhythm: Normal rate and regular rhythm.      Heart  sounds: No murmur heard.    No friction rub. No gallop.   Pulmonary:      Effort: Pulmonary effort is normal.      Breath sounds: Normal breath sounds. No wheezing or rhonchi.   Lymphadenopathy:      Cervical: No cervical adenopathy.   Skin:     General: Skin is warm and dry.   Neurological:      Mental Status: She is alert and oriented to person, place, and time.      Cranial Nerves: No cranial nerve deficit.   Psychiatric:         Mood and Affect: Mood and affect normal.         Behavior: Behavior normal.         Thought Content: Thought content normal. Thought content does not include homicidal or suicidal ideation.         Judgment: Judgment normal.        Result Review :     Strep    Common Labsle 10/31/22   POC Strep A, Molecular Negative                POCT SARS-CoV-2 Antigen MEMO + Flu  Order: [id number]   Status: Edited Result - FINAL      Visible to patient: No (scheduled for 11/1/2022  1:46 AM)      Dx: Sore throat     Specimen Information: Swab    0 Result Notes  Component Ref Range & Units 11:39   (10/31/22) 11:38   (10/31/22) 3 wk ago   (10/4/22) 3 wk ago   (10/4/22) 5 mo ago   (5/19/22) 7 mo ago   (3/9/22) 8 mo ago   (2/7/22)   SARS Antigen Not Detected, Presumptive Negative Not Detected   Detected Abnormal         Influenza A Antigen MEMO Not Detected Not Detected   Not Detected        Influenza B Antigen MEMO Not Detected Not Detected   Not Detected        Internal Control Passed Passed  Passed  Passed  Passed  Passed   Passed    Lot Number  [id number]  [id number]  [id number]  [id number]  [id number]  106,[id number]  703,[id number]    Expiration Date  03/10/2023  11/30/2023  03/10/2023                  Assessment and Plan    Diagnoses and all orders for this visit:    1. Oral candidiasis (Primary)  -     nystatin (MYCOSTATIN) 100,000 unit/mL suspension; Swish and swallow 5 mL 4 (Four) Times a Day for 7 days.  Dispense: 140 mL; Refill: 0    2. Sore throat  -     POCT SARS-CoV-2 Antigen MEMO + Flu  -     POCT rapid strep A    I  will treat her with nystatin swish and swallow, discussed proper administration of medication.  Advised to call or follow-up if no improvement or any worsening of symptoms.    Follow Up   Return if symptoms worsen or fail to improve.  Patient was given instructions and counseling regarding her condition or for health maintenance advice. Please see specific information pulled into the AVS if appropriate.       Magaly Dill, APRN  10/31/2022

## 2022-11-10 ENCOUNTER — OFFICE VISIT (OUTPATIENT)
Dept: FAMILY MEDICINE CLINIC | Facility: CLINIC | Age: 67
End: 2022-11-10

## 2022-11-10 VITALS
HEIGHT: 61 IN | WEIGHT: 132.8 LBS | OXYGEN SATURATION: 96 % | BODY MASS INDEX: 25.07 KG/M2 | TEMPERATURE: 96.3 F | DIASTOLIC BLOOD PRESSURE: 80 MMHG | SYSTOLIC BLOOD PRESSURE: 114 MMHG | HEART RATE: 81 BPM

## 2022-11-10 DIAGNOSIS — J02.9 SORE THROAT: ICD-10-CM

## 2022-11-10 DIAGNOSIS — J01.00 ACUTE NON-RECURRENT MAXILLARY SINUSITIS: Primary | ICD-10-CM

## 2022-11-10 PROCEDURE — 99213 OFFICE O/P EST LOW 20 MIN: CPT | Performed by: NURSE PRACTITIONER

## 2022-11-10 PROCEDURE — 87880 STREP A ASSAY W/OPTIC: CPT | Performed by: NURSE PRACTITIONER

## 2022-11-10 PROCEDURE — 87428 SARSCOV & INF VIR A&B AG IA: CPT | Performed by: NURSE PRACTITIONER

## 2022-11-10 RX ORDER — DOXYCYCLINE HYCLATE 100 MG/1
100 CAPSULE ORAL 2 TIMES DAILY
Qty: 14 CAPSULE | Refills: 0 | Status: SHIPPED | OUTPATIENT
Start: 2022-11-10 | End: 2022-11-17

## 2022-11-10 NOTE — PROGRESS NOTES
"Chief Complaint  Sore Throat and Nasal Congestion    Subjective          Loretta Ren, 67 y.o. female presents to [facility]  History of Present Illness   She presents today for an acute visit.  She is a patient of KIAN Lyons.  She is complaining of a sore throat, nasal congestion and green mucus drainage for the past week.  She was seen for sore throat on 10/31/2022 and was treated for oral Candida with nystatin swish and swallow.  She states she does not feel like her throat is much better.  She has never smoked.    In office rapid strep is negative.  Rapid COVID and flu swabs are also negative.       Objective   Vital Signs:   /80   Pulse 81   Temp 96.3 °F (35.7 °C)   Ht 154.9 cm (61\")   Wt 60.2 kg (132 lb 12.8 oz)   SpO2 96%   BMI 25.09 kg/m²       Current Outpatient Medications:   •  brompheniramine-pseudoephedrine-DM 30-2-10 MG/5ML syrup, Take 5 mL by mouth 4 (Four) Times a Day As Needed for Congestion, Cough or Allergies., Disp: 200 mL, Rfl: 1  •  Carboxymethylcellulose Sodium (REFRESH TEARS OP), Apply  to eye(s) as directed by provider., Disp: , Rfl:   •  carvedilol (COREG) 3.125 MG tablet, Take 1 tablet by mouth 2 (Two) Times a Day., Disp: 90 tablet, Rfl: 3  •  citalopram (CeleXA) 10 MG tablet, Take 1 tablet by mouth Daily., Disp: 90 tablet, Rfl: 1  •  fluorometholone (Flarex) 0.1 % ophthalmic suspension, 1 drop 4 (Four) Times a Day., Disp: , Rfl:   •  fluticasone (FLONASE) 50 MCG/ACT nasal spray, 2 sprays into the nostril(s) as directed by provider Daily As Needed., Disp: , Rfl:   •  loratadine (CLARITIN) 10 MG tablet, Take 10 mg by mouth Daily As Needed., Disp: , Rfl:   •  multivitamin with minerals tablet tablet, Take 1 tablet by mouth Daily., Disp: , Rfl:   •  pantoprazole (PROTONIX) 40 MG EC tablet, TAKE ONE TABLET BY MOUTH EVERY MORNING, Disp: 90 tablet, Rfl: 1  •  doxycycline (VIBRAMYCIN) 100 MG capsule, Take 1 capsule by mouth 2 (Two) " Times a Day for 7 days., Disp: 14 capsule, Rfl: 0   Past Medical History:   Diagnosis Date   • Abnormal CT of the chest 04/29/2019   • Allergic rhinitis    • Allergic rhinitis due to allergen 11/06/2018   • Anxiety 11/06/2017    IMPROVED SINCE STARTING TRINTELLIX   • Anxiety disorder 11/06/2018   • Diabetes mellitus (HCC) 11/06/2018    DIET CONTROLLED NO MEDS   • Dizziness 11/06/2017    THE PATIENT REPORTS A TRANSIENT BOUT OF DIZZINESS THAT SHE ATTRIBUTES TO STRESS. HER LAST BOUT OF DIZZINESS WAS IN JULY 2017.  I DID REVIEW HER CDS WHICH WERE UNREVEALING. I WILL REQUEST RECORES TO BE SENT TO REVIEW   • DM2 (diabetes mellitus, type 2) ([facility])    • Duodenal ulcer    • Facial pain 11/06/2017    THE PATIENT REPORTS A TRANSIENT BOUT OF FACIAL PAIN WITH RADIATION TOEARDS THE RIGHT EAR. SHE NOTES THAT THIS HAS RESOLVED. I DID PERSOANLLY REVIEW HER CT FACE WHICH WAS ESSENTIALLY UNREVELAING   • Fibrosis lung (HCC) 11/06/2018   • Generalized anxiety disorder 04/29/2019   • GERD (gastroesophageal reflux disease) 11/06/2018   • Heart murmur     CONTROLLED   • Hemorrhoids    • Hyperlipidemia 11/06/2018   • Mitral valve disorder    • Osteopenia 04/29/2019   • PONV (postoperative nausea and vomiting)    • PVC (premature ventricular contraction) 8/4/2021   • Renal calculus or stone    • Sarcoidosis    • Sinus trouble    • Type 2 diabetes mellitus with hyperglycemia, without long-term current use of insulin ([facility]) 04/29/2019      Physical Exam  Vitals reviewed.   Constitutional:       Appearance: Normal appearance. She is well-developed.   HENT:      Right Ear: Tympanic membrane, ear canal and external ear normal.      Left Ear: Tympanic membrane, ear canal and external ear normal.      Nose:      Right Sinus: Maxillary sinus tenderness present.      Left Sinus: Maxillary sinus tenderness present.      Mouth/Throat:      Mouth: Mucous membranes are moist.      Pharynx: No pharyngeal swelling, oropharyngeal exudate or posterior  oropharyngeal erythema.   Neck:      Thyroid: No thyroid mass, thyromegaly or thyroid tenderness.   Cardiovascular:      Rate and Rhythm: Normal rate and regular rhythm.      Heart sounds: No murmur heard.    No friction rub. No gallop.   Pulmonary:      Effort: Pulmonary effort is normal.      Breath sounds: Normal breath sounds. No wheezing or rhonchi.   Lymphadenopathy:      Cervical: No cervical adenopathy.   Skin:     General: Skin is warm and dry.   Neurological:      Mental Status: She is alert and oriented to person, place, and time.      Cranial Nerves: No cranial nerve deficit.   Psychiatric:         Mood and Affect: Mood and affect normal.         Behavior: Behavior normal.         Thought Content: Thought content normal. Thought content does not include homicidal or suicidal ideation.         Judgment: Judgment normal.        Result Review :     Strep    Common Labsle 11/10/22   POC Strep A, Molecular Negative           SARS Antigen   Date Value Ref Range Status   11/10/2022 Not Detected Not Detected, Presumptive Negative Final     Influenza A Antigen MEMO   Date Value Ref Range Status   11/10/2022 Not Detected Not Detected Final     Influenza B Antigen MEMO   Date Value Ref Range Status   11/10/2022 Not Detected Not Detected Final               Assessment and Plan    Diagnoses and all orders for this visit:    1. Acute non-recurrent maxillary sinusitis (Primary)  -     doxycycline (VIBRAMYCIN) 100 MG capsule; Take 1 capsule by mouth 2 (Two) Times a Day for 7 days.  Dispense: 14 capsule; Refill: 0    2. Sore throat  -     POCT SARS-CoV-2 Antigen MEMO + Flu  -     POCT rapid strep A    I will treat her with doxycycline.  Discussed sinus rinses and pamphlet given on sinus rinses.    Follow Up   Return if symptoms worsen or fail to improve.  Patient was given instructions and counseling regarding her condition or for health maintenance advice. Please see specific information pulled into the AVS if appropriate.        Magaly Dill, APRN  11/10/2022

## 2023-01-16 ENCOUNTER — OFFICE VISIT (OUTPATIENT)
Dept: FAMILY MEDICINE CLINIC | Facility: CLINIC | Age: 68
End: 2023-01-16
Payer: MEDICARE

## 2023-01-16 VITALS
HEART RATE: 74 BPM | OXYGEN SATURATION: 98 % | SYSTOLIC BLOOD PRESSURE: 120 MMHG | RESPIRATION RATE: 12 BRPM | DIASTOLIC BLOOD PRESSURE: 78 MMHG | TEMPERATURE: 97.8 F

## 2023-01-16 DIAGNOSIS — J02.9 SORE THROAT: Primary | ICD-10-CM

## 2023-01-16 LAB
EXPIRATION DATE: NORMAL
INTERNAL CONTROL: NORMAL
Lab: NORMAL
S PYO AG THROAT QL: NEGATIVE

## 2023-01-16 PROCEDURE — 87880 STREP A ASSAY W/OPTIC: CPT | Performed by: NURSE PRACTITIONER

## 2023-01-16 PROCEDURE — 99213 OFFICE O/P EST LOW 20 MIN: CPT | Performed by: NURSE PRACTITIONER

## 2023-01-16 RX ORDER — PREDNISONE 20 MG/1
20 TABLET ORAL 2 TIMES DAILY
Qty: 10 TABLET | Refills: 0 | Status: SHIPPED | OUTPATIENT
Start: 2023-01-16 | End: 2023-01-21

## 2023-01-19 ENCOUNTER — TELEPHONE (OUTPATIENT)
Dept: FAMILY MEDICINE CLINIC | Facility: CLINIC | Age: 68
End: 2023-01-19
Payer: MEDICARE

## 2023-01-19 RX ORDER — AZITHROMYCIN 250 MG/1
TABLET, FILM COATED ORAL
Qty: 6 TABLET | Refills: 0 | Status: SHIPPED | OUTPATIENT
Start: 2023-01-19 | End: 2023-02-01 | Stop reason: SDDI

## 2023-01-19 NOTE — TELEPHONE ENCOUNTER
Caller: Loretta Ren    Relationship: Self    Best call back number: [phone]    What medication are you requesting: ANTIBIOTIC    What are your current symptoms: SORE THROAT    Have you had these symptoms before:    [x] Yes  [] No    Have you been treated for these symptoms before:   [x] Yes  [] No    If a prescription is needed, what is your preferred pharmacy and phone number: [place], [place] - [address] - [phone] [facility] [phone] FX

## 2023-01-25 ENCOUNTER — TELEPHONE (OUTPATIENT)
Dept: FAMILY MEDICINE CLINIC | Facility: CLINIC | Age: 68
End: 2023-01-25

## 2023-01-25 NOTE — TELEPHONE ENCOUNTER
Caller: Loretta Ren    Relationship to patient: Self    Best call back number: [phone]    Chief complaint: STOMACH PAIN AND NAUSEA    Type of visit: OFFICE    Requested date: ASAP     Additional notes:THE PATIENT STATED SHE HAS BEEN HAVING STOMACH PAIN AND NAUSEA EVER SINCE BEING TREATED FOR STREP. SHE WOULD LIKE A CALL BACK TO DISCUSS SCHEDULING

## 2023-02-01 ENCOUNTER — OFFICE VISIT (OUTPATIENT)
Dept: FAMILY MEDICINE CLINIC | Facility: CLINIC | Age: 68
End: 2023-02-01
Payer: MEDICARE

## 2023-02-01 VITALS
SYSTOLIC BLOOD PRESSURE: 127 MMHG | RESPIRATION RATE: 16 BRPM | HEART RATE: 77 BPM | OXYGEN SATURATION: 97 % | DIASTOLIC BLOOD PRESSURE: 74 MMHG | HEIGHT: 61 IN | TEMPERATURE: 97.5 F | BODY MASS INDEX: 25.05 KG/M2 | WEIGHT: 132.7 LBS

## 2023-02-01 DIAGNOSIS — R11.0 NAUSEA: ICD-10-CM

## 2023-02-01 DIAGNOSIS — R10.13 EPIGASTRIC PAIN: Primary | ICD-10-CM

## 2023-02-01 DIAGNOSIS — J30.2 OTHER SEASONAL ALLERGIC RHINITIS: ICD-10-CM

## 2023-02-01 DIAGNOSIS — J02.9 SORE THROAT: ICD-10-CM

## 2023-02-01 PROCEDURE — 82785 ASSAY OF IGE: CPT | Performed by: NURSE PRACTITIONER

## 2023-02-01 PROCEDURE — 99214 OFFICE O/P EST MOD 30 MIN: CPT | Performed by: NURSE PRACTITIONER

## 2023-02-01 PROCEDURE — 86003 ALLG SPEC IGE CRUDE XTRC EA: CPT | Performed by: NURSE PRACTITIONER

## 2023-02-01 PROCEDURE — 86677 HELICOBACTER PYLORI ANTIBODY: CPT | Performed by: NURSE PRACTITIONER

## 2023-02-01 PROCEDURE — 86008 ALLG SPEC IGE RECOMB EA: CPT | Performed by: NURSE PRACTITIONER

## 2023-02-01 RX ORDER — FAMOTIDINE 20 MG/1
20 TABLET, FILM COATED ORAL NIGHTLY
Qty: 30 TABLET | Refills: 1 | Status: SHIPPED | OUTPATIENT
Start: 2023-02-01 | End: 2023-03-09 | Stop reason: SDDI

## 2023-02-01 NOTE — PROGRESS NOTES
Chief Complaint  Abdominal Pain (Upper abdominal pain and pressure), Nausea, and Sore Throat (States throat still sore)    Subjective          Loretta Ren presents to [facility]  History of Present Illness  She is having upper abdomen discomfort pressure.  She has been nauseated.  She does have a hiatal hernia.  She is currently taking her Protonix on a daily basis.  She has not had vomiting but she has had the nausea.  She states that she also has a sore throat.  She was unable to tolerate fully the Zithromax and the prednisone it made her stomach even hurt worse.  She has not ever taken Pepcid at night.  She has not had a scope recently.  She has not having any fever.  She has not been around strep recently her last episode was when she had strep a few weeks ago.  She is able to eat but sometimes she feels like things get a little stuck.  She is able to drink okay.      Allergies  Aspirin and Ceftriaxone    Social History     Tobacco Use   • Smoking status: Never   • Smokeless tobacco: Never   Vaping Use   • Vaping Use: Never used   Substance Use Topics   • Alcohol use: Never   • Drug use: Never       Family History   Problem Relation Age of Onset   • Breast cancer Mother 80   • Heart disease Mother    • Kidney cancer Father 78        bladder   • Heart disease Daughter    • Malig Hyperthermia Neg Hx         Health Maintenance Due   Topic Date Due   • ZOSTER VACCINE (3 of 3) 02/05/2015   • DIABETIC FOOT EXAM  Never done        Immunization History   Administered Date(s) Administered   • Flu Vaccine Quad PF >36MO 10/06/2015, 12/01/2016, 10/03/2017, 10/10/2019, 10/06/2020   • Fluzone High-Dose 65+yrs 09/30/2021, 10/17/2022   • Fluzone Quad >6mos (Multi-dose) 10/19/2018   • Influenza TIV (IM) 10/31/2014   • Influenza, Unspecified 10/06/2020   • PEDS-Pneumococcal Conjugate (PCV7) 10/31/2014   • Pneumococcal Polysaccharide (PPSV23) 10/31/2014   • Shingrix 12/11/2014   • Zostavax  "12/11/2014       Review of Systems   Constitutional: Negative for fatigue and fever.   HENT: Positive for sore throat.    Respiratory: Negative for cough and shortness of breath.    Cardiovascular: Negative for chest pain.   Gastrointestinal: Positive for abdominal pain, nausea and GERD. Negative for diarrhea and vomiting.        Objective       Vitals:    02/01/23 0944   BP: 127/74   Pulse: 77   Resp: 16   Temp: 97.5 °F (36.4 °C)   SpO2: 97%   Weight: 60.2 kg (132 lb 11.2 oz)   Height: 154.9 cm (61\")       Body mass index is 25.07 kg/m².         Physical Exam  Vitals reviewed.   Constitutional:       Appearance: Normal appearance. She is well-developed.   Cardiovascular:      Rate and Rhythm: Normal rate and regular rhythm.      Heart sounds: Normal heart sounds. No murmur heard.  Pulmonary:      Effort: Pulmonary effort is normal.      Breath sounds: Normal breath sounds.   Abdominal:      General: Bowel sounds are normal.      Palpations: Abdomen is soft.      Tenderness: There is abdominal tenderness. There is no guarding or rebound.      Hernia: No hernia is present.   Neurological:      Mental Status: She is alert and oriented to person, place, and time.      Cranial Nerves: No cranial nerve deficit.      Motor: No weakness.   Psychiatric:         Mood and Affect: Mood and affect normal.             Result Review :     The following data was reviewed by: KIAN Lyons on 02/01/2023:                     Assessment and Plan      Diagnoses and all orders for this visit:    1. Epigastric pain (Primary)  -     Alpha-Gal IgE Panel  -     Helicobacter Pylori, IgA IgG IgM  -     famotidine (Pepcid) 20 MG tablet; Take 1 tablet by mouth Every Night.  Dispense: 30 tablet; Refill: 1  -     NM Gastric Emptying; Future    2. Sore throat  -     Alpha-Gal IgE Panel  -     Helicobacter Pylori, IgA IgG IgM  -     famotidine (Pepcid) 20 MG tablet; Take 1 tablet by mouth Every Night.  Dispense: 30 tablet; Refill: " 1  -     NM Gastric Emptying; Future    3. Nausea  -     NM Gastric Emptying; Future    4. Other seasonal allergic rhinitis  -     Alpha-Gal IgE Panel            Follow Up     Return if symptoms worsen or fail to improve.  We will add pepcid at night for the stomach and cont with the protonix.  We will do labs today and also order the gastric empty test and if all neg then refer over for a scope.  Patient was given instructions and counseling regarding her condition or for health maintenance advice. Please see specific information pulled into the AVS if appropriate.     Parts of this note are electronic transcriptions/translations of spoken language to printed text using the Dragon Dictation system.          Natalie Mukherjee, APRN  02/01/2023

## 2023-02-03 LAB
H PYLORI IGA SER-ACNC: <9 UNITS (ref 0–8.9)
H PYLORI IGG SER IA-ACNC: 0.28 INDEX VALUE (ref 0–0.79)
H PYLORI IGM SER-ACNC: <9 UNITS (ref 0–8.9)

## 2023-02-09 LAB
ALPHA-GAL IGE QN: <0.1 KU/L
BEEF IGE QN: <0.1 KU/L
CONV CLASS DESCRIPTION: NORMAL
IGE SERPL-ACNC: 24 IU/ML (ref 6–495)
LAMB IGE QN: <0.1 KU/L
PORK IGE QN: <0.1 KU/L

## 2023-02-14 ENCOUNTER — HOSPITAL ENCOUNTER (OUTPATIENT)
Dept: NUCLEAR MEDICINE | Facility: HOSPITAL | Age: 68
Discharge: HOME | End: 2023-02-14
Admitting: NURSE PRACTITIONER
Payer: MEDICARE

## 2023-02-14 DIAGNOSIS — J02.9 SORE THROAT: ICD-10-CM

## 2023-02-14 DIAGNOSIS — R10.13 EPIGASTRIC PAIN: ICD-10-CM

## 2023-02-14 DIAGNOSIS — R11.0 NAUSEA: ICD-10-CM

## 2023-02-14 PROCEDURE — 78264 GASTRIC EMPTYING IMG STUDY: CPT

## 2023-02-14 PROCEDURE — A9541 TC99M SULFUR COLLOID: HCPCS | Performed by: NURSE PRACTITIONER

## 2023-02-14 PROCEDURE — 0 TECHNETIUM SULFUR COLLOID: Performed by: NURSE PRACTITIONER

## 2023-02-14 RX ADMIN — TECHNETIUM TC 99M SULFUR COLLOID 1 DOSE: KIT at 07:53

## 2023-02-21 DIAGNOSIS — I10 ESSENTIAL HYPERTENSION: Primary | ICD-10-CM

## 2023-02-21 RX ORDER — CARVEDILOL 3.12 MG/1
TABLET ORAL
Qty: 90 TABLET | Refills: 3 | Status: SHIPPED | OUTPATIENT
Start: 2023-02-21

## 2023-03-06 ENCOUNTER — TELEPHONE (OUTPATIENT)
Dept: FAMILY MEDICINE CLINIC | Facility: CLINIC | Age: 68
End: 2023-03-06
Payer: MEDICARE

## 2023-03-06 DIAGNOSIS — J02.9 SORE THROAT: Primary | ICD-10-CM

## 2023-03-06 DIAGNOSIS — J30.2 OTHER SEASONAL ALLERGIC RHINITIS: ICD-10-CM

## 2023-03-06 NOTE — TELEPHONE ENCOUNTER
Caller: Loretta Ren    Relationship: Self    Best call back number: [phone]    What orders are you requesting (i.e. lab or imaging): ALLERGY TESTS    In what timeframe would the patient need to come in: AS SOON AS POSSIBLE    Additional notes: PATIENT STATES THROAT IS STILL HURTING AND IT SEEMS TO BE A CHRONIC SYMPTOM.

## 2023-03-07 ENCOUNTER — TRANSCRIBE ORDERS (OUTPATIENT)
Dept: ADMINISTRATIVE | Facility: HOSPITAL | Age: 68
End: 2023-03-07
Payer: MEDICARE

## 2023-03-07 DIAGNOSIS — Z12.31 VISIT FOR SCREENING MAMMOGRAM: Primary | ICD-10-CM

## 2023-03-09 ENCOUNTER — OFFICE VISIT (OUTPATIENT)
Dept: FAMILY MEDICINE CLINIC | Facility: CLINIC | Age: 68
End: 2023-03-09
Payer: MEDICARE

## 2023-03-09 VITALS
DIASTOLIC BLOOD PRESSURE: 74 MMHG | RESPIRATION RATE: 20 BRPM | OXYGEN SATURATION: 98 % | TEMPERATURE: 97.8 F | HEART RATE: 74 BPM | SYSTOLIC BLOOD PRESSURE: 100 MMHG

## 2023-03-09 DIAGNOSIS — J02.9 SORE THROAT: Primary | ICD-10-CM

## 2023-03-09 PROCEDURE — 99213 OFFICE O/P EST LOW 20 MIN: CPT | Performed by: NURSE PRACTITIONER

## 2023-03-09 PROCEDURE — 1159F MED LIST DOCD IN RCRD: CPT | Performed by: NURSE PRACTITIONER

## 2023-03-09 PROCEDURE — 3074F SYST BP LT 130 MM HG: CPT | Performed by: NURSE PRACTITIONER

## 2023-03-09 PROCEDURE — 87428 SARSCOV & INF VIR A&B AG IA: CPT | Performed by: NURSE PRACTITIONER

## 2023-03-09 PROCEDURE — 3078F DIAST BP <80 MM HG: CPT | Performed by: NURSE PRACTITIONER

## 2023-03-09 PROCEDURE — 87880 STREP A ASSAY W/OPTIC: CPT | Performed by: NURSE PRACTITIONER

## 2023-03-09 NOTE — PROGRESS NOTES
Chief Complaint  Sore Throat (States antiacids made her dizzy and she stopped monday)    Subjective          Loretta Ren presents to [facility]  History of Present Illness  She tried the Pepcid and protonix but she felt dizziness with the med and therefore she stopped them.  She has not heard from the allergy Appointment and she is going to have skin cancer removed from her lip and she would really like to get in before that if at all possible.      She said that she has had some dizziness with the Pepcid and Protonix.  She does not know if it was the medicine or if it is potentially the weather.  She has not been taking her Claritin daily.    Allergies  Aspirin and Ceftriaxone    Social History     Tobacco Use   • Smoking status: Never   • Smokeless tobacco: Never   Vaping Use   • Vaping Use: Never used   Substance Use Topics   • Alcohol use: Never   • Drug use: Never       Family History   Problem Relation Age of Onset   • Breast cancer Mother 80   • Heart disease Mother    • Kidney cancer Father 78        bladder   • Heart disease Daughter    • Malig Hyperthermia Neg Hx         Health Maintenance Due   Topic Date Due   • ZOSTER VACCINE (3 of 3) 02/05/2015   • DIABETIC FOOT EXAM  Never done        Immunization History   Administered Date(s) Administered   • Flu Vaccine Quad PF >36MO 10/06/2015, 12/01/2016, 10/03/2017, 10/10/2019, 10/06/2020   • Fluzone High-Dose 65+yrs 09/30/2021, 10/17/2022   • Fluzone Quad >6mos (Multi-dose) 10/19/2018   • Influenza TIV (IM) 10/31/2014   • Influenza, Unspecified 10/06/2020   • PEDS-Pneumococcal Conjugate (PCV7) 10/31/2014   • Pneumococcal Polysaccharide (PPSV23) 10/31/2014   • Shingrix 12/11/2014   • Zostavax 12/11/2014       Review of Systems   HENT: Positive for congestion, postnasal drip and sore throat.    Gastrointestinal: Negative for nausea and vomiting.   Neurological: Positive for dizziness.        Objective       Vitals:    03/09/23  1319   BP: 100/74   Pulse: 74   Resp: 20   Temp: 97.8 °F (36.6 °C)   SpO2: 98%       There is no height or weight on file to calculate BMI.         Physical Exam  Constitutional:       Appearance: Normal appearance.   HENT:      Head: Normocephalic.      Nose: Rhinorrhea present. No congestion.      Mouth/Throat:      Pharynx: Posterior oropharyngeal erythema present. No oropharyngeal exudate.   Pulmonary:      Effort: Pulmonary effort is normal.   Skin:     Findings: No bruising.   Neurological:      General: No focal deficit present.      Mental Status: She is alert and oriented to person, place, and time.   Psychiatric:         Mood and Affect: Mood normal.         Behavior: Behavior normal.         Thought Content: Thought content normal.         Judgment: Judgment normal.             Result Review :     The following data was reviewed by: KIAN Lyons on 03/09/2023:    COVIDFLU   SARS Antigen   Date Value Ref Range Status   03/09/2023 Not Detected Not Detected, Presumptive Negative      Influenza A Antigen MEMO   Date Value Ref Range Status   03/09/2023 Not Detected Not Detected      Influenza B Antigen MEMO   Date Value Ref Range Status   03/09/2023 Not Detected Not Detected      Internal Control   Date Value Ref Range Status   03/09/2023 Passed Passed Final     Lot Number   Date Value Ref Range Status   03/09/2023 708,[id number]  Final     Expiration Date   Date Value Ref Range Status   03/09/2023 06/30/2024  Final     RAPIDSTREP   Strep    Common Labsle 3/9/23   POC Strep A, Molecular Negative                          Assessment and Plan      Diagnoses and all orders for this visit:    1. Sore throat (Primary)  -     POCT SARS-CoV-2 Antigen MEMO + Flu  -     POCT rapid strep A            Follow Up     Return if symptoms worsen or fail to improve.  I spoke to Elma with family asthma and allergy.  Patient is aware of her appointment is this upcoming Tuesday, 3/14/2023 at 10:30 in the morning.  Information  given to patient.  She is aware not to take any antihistamines at all including Benadryl 5 days prior.  Patient was given instructions and counseling regarding her condition or for health maintenance advice. Please see specific information pulled into the AVS if appropriate.     Parts of this note are electronic transcriptions/translations of spoken language to printed text using the Dragon Dictation system.          Natalie Mukherjee, KIAN  03/09/2023

## 2023-03-23 DIAGNOSIS — J02.9 SORE THROAT: ICD-10-CM

## 2023-03-23 DIAGNOSIS — R10.13 EPIGASTRIC PAIN: ICD-10-CM

## 2023-03-23 RX ORDER — FAMOTIDINE 20 MG/1
TABLET, FILM COATED ORAL
Qty: 30 TABLET | Refills: 1 | OUTPATIENT
Start: 2023-03-23

## 2023-04-06 ENCOUNTER — LAB (OUTPATIENT)
Dept: FAMILY MEDICINE CLINIC | Facility: CLINIC | Age: 68
End: 2023-04-06
Payer: MEDICARE

## 2023-04-06 DIAGNOSIS — E78.2 MIXED HYPERLIPIDEMIA: ICD-10-CM

## 2023-04-06 DIAGNOSIS — E11.9 TYPE 2 DIABETES MELLITUS WITHOUT COMPLICATION, WITHOUT LONG-TERM CURRENT USE OF INSULIN: ICD-10-CM

## 2023-04-06 LAB
ALBUMIN SERPL-MCNC: 4.2 G/DL (ref 3.5–5.2)
ALBUMIN UR-MCNC: <1.2 MG/DL
ALBUMIN/GLOB SERPL: 1.6 G/DL
ALP SERPL-CCNC: 80 U/L (ref 39–117)
ALT SERPL W P-5'-P-CCNC: 21 U/L (ref 1–33)
ANION GAP SERPL CALCULATED.3IONS-SCNC: 9 MMOL/L (ref 5–15)
AST SERPL-CCNC: 18 U/L (ref 1–32)
BASOPHILS # BLD AUTO: 0.01 10*3/MM3 (ref 0–0.2)
BASOPHILS NFR BLD AUTO: 0.2 % (ref 0–1.5)
BILIRUB SERPL-MCNC: 0.7 MG/DL (ref 0–1.2)
BUN SERPL-MCNC: 9 MG/DL (ref 8–23)
BUN/CREAT SERPL: 13.6 (ref 7–25)
CALCIUM SPEC-SCNC: 9.1 MG/DL (ref 8.6–10.5)
CHLORIDE SERPL-SCNC: 103 MMOL/L (ref 98–107)
CHOLEST SERPL-MCNC: 175 MG/DL (ref 0–200)
CO2 SERPL-SCNC: 23 MMOL/L (ref 22–29)
CREAT SERPL-MCNC: 0.66 MG/DL (ref 0.57–1)
DEPRECATED RDW RBC AUTO: 41.7 FL (ref 37–54)
EGFRCR SERPLBLD CKD-EPI 2021: 95.1 ML/MIN/1.73
EOSINOPHIL # BLD AUTO: 0.12 10*3/MM3 (ref 0–0.4)
EOSINOPHIL NFR BLD AUTO: 1.8 % (ref 0.3–6.2)
ERYTHROCYTE [DISTWIDTH] IN BLOOD BY AUTOMATED COUNT: 12.6 % (ref 12.3–15.4)
GLOBULIN UR ELPH-MCNC: 2.7 GM/DL
GLUCOSE SERPL-MCNC: 128 MG/DL (ref 65–99)
HBA1C MFR BLD: 7.1 % (ref 4.8–5.6)
HCT VFR BLD AUTO: 42.1 % (ref 34–46.6)
HDLC SERPL-MCNC: 48 MG/DL (ref 40–60)
HGB BLD-MCNC: 14.5 G/DL (ref 12–15.9)
IMM GRANULOCYTES # BLD AUTO: 0.01 10*3/MM3 (ref 0–0.05)
IMM GRANULOCYTES NFR BLD AUTO: 0.2 % (ref 0–0.5)
LDLC SERPL CALC-MCNC: 107 MG/DL (ref 0–100)
LDLC/HDLC SERPL: 2.18 {RATIO}
LYMPHOCYTES # BLD AUTO: 1.68 10*3/MM3 (ref 0.7–3.1)
LYMPHOCYTES NFR BLD AUTO: 25.6 % (ref 19.6–45.3)
MCH RBC QN AUTO: 31 PG (ref 26.6–33)
MCHC RBC AUTO-ENTMCNC: 34.4 G/DL (ref 31.5–35.7)
MCV RBC AUTO: 90 FL (ref 79–97)
MONOCYTES # BLD AUTO: 0.55 10*3/MM3 (ref 0.1–0.9)
MONOCYTES NFR BLD AUTO: 8.4 % (ref 5–12)
NEUTROPHILS NFR BLD AUTO: 4.2 10*3/MM3 (ref 1.7–7)
NEUTROPHILS NFR BLD AUTO: 63.8 % (ref 42.7–76)
NRBC BLD AUTO-RTO: 0.2 /100 WBC (ref 0–0.2)
PLATELET # BLD AUTO: 213 10*3/MM3 (ref 140–450)
PMV BLD AUTO: 11.3 FL (ref 6–12)
POTASSIUM SERPL-SCNC: 4.3 MMOL/L (ref 3.5–5.2)
PROT SERPL-MCNC: 6.9 G/DL (ref 6–8.5)
RBC # BLD AUTO: 4.68 10*6/MM3 (ref 3.77–5.28)
SODIUM SERPL-SCNC: 135 MMOL/L (ref 136–145)
TRIGL SERPL-MCNC: 113 MG/DL (ref 0–150)
TSH SERPL DL<=0.05 MIU/L-ACNC: 1.16 UIU/ML (ref 0.27–4.2)
VLDLC SERPL-MCNC: 20 MG/DL (ref 5–40)
WBC NRBC COR # BLD: 6.57 10*3/MM3 (ref 3.4–10.8)

## 2023-04-06 PROCEDURE — 82043 UR ALBUMIN QUANTITATIVE: CPT | Performed by: NURSE PRACTITIONER

## 2023-04-06 PROCEDURE — 85025 COMPLETE CBC W/AUTO DIFF WBC: CPT | Performed by: NURSE PRACTITIONER

## 2023-04-06 PROCEDURE — 80053 COMPREHEN METABOLIC PANEL: CPT | Performed by: NURSE PRACTITIONER

## 2023-04-06 PROCEDURE — 80061 LIPID PANEL: CPT | Performed by: NURSE PRACTITIONER

## 2023-04-06 PROCEDURE — 83036 HEMOGLOBIN GLYCOSYLATED A1C: CPT | Performed by: NURSE PRACTITIONER

## 2023-04-06 PROCEDURE — 84443 ASSAY THYROID STIM HORMONE: CPT | Performed by: NURSE PRACTITIONER

## 2023-04-11 ENCOUNTER — OFFICE VISIT (OUTPATIENT)
Dept: FAMILY MEDICINE CLINIC | Facility: CLINIC | Age: 68
End: 2023-04-11
Payer: MEDICARE

## 2023-04-11 VITALS
BODY MASS INDEX: 25.41 KG/M2 | HEART RATE: 74 BPM | OXYGEN SATURATION: 98 % | DIASTOLIC BLOOD PRESSURE: 82 MMHG | SYSTOLIC BLOOD PRESSURE: 100 MMHG | TEMPERATURE: 98.4 F | HEIGHT: 61 IN | WEIGHT: 134.6 LBS | RESPIRATION RATE: 16 BRPM

## 2023-04-11 DIAGNOSIS — Z00.00 MEDICARE ANNUAL WELLNESS VISIT, SUBSEQUENT: Primary | ICD-10-CM

## 2023-04-11 DIAGNOSIS — K21.9 GASTROESOPHAGEAL REFLUX DISEASE WITHOUT ESOPHAGITIS: ICD-10-CM

## 2023-04-11 DIAGNOSIS — E11.9 TYPE 2 DIABETES MELLITUS WITHOUT COMPLICATION, WITHOUT LONG-TERM CURRENT USE OF INSULIN: ICD-10-CM

## 2023-04-11 DIAGNOSIS — E78.2 MIXED HYPERLIPIDEMIA: ICD-10-CM

## 2023-04-11 DIAGNOSIS — F41.1 GENERALIZED ANXIETY DISORDER: ICD-10-CM

## 2023-04-11 DIAGNOSIS — I10 ESSENTIAL HYPERTENSION: ICD-10-CM

## 2023-04-11 RX ORDER — CITALOPRAM 10 MG/1
10 TABLET ORAL DAILY
Qty: 90 TABLET | Refills: 1 | Status: SHIPPED | OUTPATIENT
Start: 2023-04-11

## 2023-04-11 RX ORDER — PANTOPRAZOLE SODIUM 40 MG/1
40 TABLET, DELAYED RELEASE ORAL DAILY
Qty: 90 TABLET | Refills: 1 | Status: SHIPPED | OUTPATIENT
Start: 2023-04-11

## 2023-04-11 NOTE — PROGRESS NOTES
The ABCs of the Annual Wellness Visit  Subsequent Medicare Wellness Visit    Subjective    Loretta Ren is a 68 y.o. female who presents for a Subsequent Medicare Wellness Visit.    The following portions of the patient's history were reviewed and   updated as appropriate: allergies, current medications, past family history, past medical history, past social history, past surgical history and problem list.    Compared to one year ago, the patient feels her physical   health is better.    Compared to one year ago, the patient feels her mental   health is better.    Recent Hospitalizations:  She was not admitted to the hospital during the last year.       Current Medical Providers:  Patient Care Team:  Natalie Mukherjee APRN as PCP - General (Nurse Practitioner)  Deacon Rosales MD as Consulting Physician (Urology)  Kendra Traore APRN as Nurse Practitioner (Nurse Practitioner)    Outpatient Medications Prior to Visit   Medication Sig Dispense Refill   • Carboxymethylcellulose Sodium (REFRESH TEARS OP) Apply  to eye(s) as directed by provider.     • carvedilol (COREG) 3.125 MG tablet TAKE ONE TABLET BY MOUTH TWICE DAILY 90 tablet 3   • fluticasone (FLONASE) 50 MCG/ACT nasal spray 2 sprays into the nostril(s) as directed by provider Daily As Needed.     • multivitamin with minerals tablet tablet Take 1 tablet by mouth Daily.     • citalopram (CeleXA) 10 MG tablet Take 1 tablet by mouth Daily. 90 tablet 1   • fluorometholone (Flarex) 0.1 % ophthalmic suspension 1 drop 4 (Four) Times a Day. (Patient not taking: Reported on 4/11/2023)     • loratadine (CLARITIN) 10 MG tablet Take 1 tablet by mouth Daily As Needed. (Patient not taking: Reported on 4/11/2023)     • brompheniramine-pseudoephedrine-DM 30-2-10 MG/5ML syrup Take 5 mL by mouth 4 (Four) Times a Day As Needed for Congestion, Cough or Allergies. (Patient not taking: Reported on 4/11/2023) 200 mL 1     No facility-administered medications prior to visit.  "      No opioid medication identified on active medication list. I have reviewed chart for other potential  high risk medication/s and harmful drug interactions in the elderly.          Aspirin is not on active medication list.  Aspirin use is not indicated based on review of current medical condition/s. Risk of harm outweighs potential benefits.  .    Patient Active Problem List   Diagnosis   • Hyperlipidemia   • PVC (premature ventricular contraction)   • Allergic rhinitis   • Fibrosis lung (HCC)   • Generalized anxiety disorder   • GERD (gastroesophageal reflux disease)   • Osteopenia   • Type 2 diabetes mellitus (HCC)   • Sarcoidosis   • Essential hypertension   • Nephrolithiasis   • Ureteral stone     Advance Care Planning   Advance Care Planning     Advance Directive is not on file.  ACP discussion was declined by the patient. Patient does not have an advance directive, information provided.     Objective    Vitals:    04/11/23 1042   BP: 100/82   Pulse: 74   Resp: 16   Temp: 98.4 °F (36.9 °C)   SpO2: 98%   Weight: 61.1 kg (134 lb 9.6 oz)   Height: 154.9 cm (61\")     Estimated body mass index is 25.43 kg/m² as calculated from the following:    Height as of this encounter: 154.9 cm (61\").    Weight as of this encounter: 61.1 kg (134 lb 9.6 oz).    BMI is >= 25 and <30. (Overweight) The following options were offered after discussion;: exercise counseling/recommendations and nutrition counseling/recommendations      Does the patient have evidence of cognitive impairment? No    Lab Results   Component Value Date    TRIG 113 04/06/2023    HDL 48 04/06/2023     (H) 04/06/2023    VLDL 20 04/06/2023    HGBA1C 7.10 (H) 04/06/2023        HEALTH RISK ASSESSMENT    Smoking Status:  Social History     Tobacco Use   Smoking Status Never   Smokeless Tobacco Never     Alcohol Consumption:  Social History     Substance and Sexual Activity   Alcohol Use Never     Fall Risk Screen:    LASHAE Fall Risk Assessment was " completed, and patient is at LOW risk for falls.Assessment completed on:2023    Depression Screenin/11/2023    10:45 AM   PHQ-2/PHQ-9 Depression Screening   Little Interest or Pleasure in Doing Things 0-->not at all   Feeling Down, Depressed or Hopeless 0-->not at all   PHQ-9: Brief Depression Severity Measure Score 0       Health Habits and Functional and Cognitive Screenin/11/2023    10:43 AM   Functional & Cognitive Status   Do you have difficulty preparing food and eating? No   Do you have difficulty bathing yourself, getting dressed or grooming yourself? No   Do you have difficulty using the toilet? No   Do you have difficulty moving around from place to place? No   Do you have trouble with steps or getting out of a bed or a chair? No   Current Diet Well Balanced Diet   Dental Exam Up to date        Dental Exam Comment due in    Eye Exam Not up to date   Exercise (times per week) 7 times per week   Current Exercises Include Walking;Yard Work   Do you need help using the phone?  No   Are you deaf or do you have serious difficulty hearing?  No   Do you need help with transportation? No   Do you need help shopping? No   Do you need help preparing meals?  No   Do you need help with housework?  No   Do you need help with laundry? No   Do you need help taking your medications? No   Do you need help managing money? No   Do you ever drive or ride in a car without wearing a seat belt? No   Have you felt unusual stress, anger or loneliness in the last month? No   Who do you live with? Spouse   If you need help, do you have trouble finding someone available to you? No   Have you been bothered in the last four weeks by sexual problems? No   Do you have difficulty concentrating, remembering or making decisions? Yes       Age-appropriate Screening Schedule:  Refer to the list below for future screening recommendations based on patient's age, sex and/or medical conditions. Orders for these  recommended tests are listed in the plan section. The patient has been provided with a written plan.    Health Maintenance   Topic Date Due   • ZOSTER VACCINE (3 of 3) 02/05/2015   • Pneumococcal Vaccine 65+ (2 - PCV) 10/31/2015   • DIABETIC FOOT EXAM  Never done   • DIABETIC EYE EXAM  03/31/2023   • TDAP/TD VACCINES (1 - Tdap) 05/19/2023 (Originally 4/10/1974)   • COVID-19 Vaccine (1) 11/27/2023 (Originally 1955)   • INFLUENZA VACCINE  08/01/2023   • HEMOGLOBIN A1C  10/06/2023   • LIPID PANEL  04/06/2024   • URINE MICROALBUMIN  04/06/2024   • MAMMOGRAM  04/09/2024   • DXA SCAN  04/09/2024   • ANNUAL WELLNESS VISIT  04/11/2024   • COLORECTAL CANCER SCREENING  05/19/2025   • HEPATITIS C SCREENING  Completed                  CMS Preventative Services Quick Reference  Risk Factors Identified During Encounter  Fall Risk-High or Moderate: Discussed Fall Prevention in the home  Immunizations Discussed/Encouraged: Influenza, Prevnar 20 (Pneumococcal 20-valent conjugate), Shingrix and COVID19  Dental Screening Recommended  Vision Screening Recommended  The above risks/problems have been discussed with the patient.  Pertinent information has been shared with the patient in the After Visit Summary.  An After Visit Summary and PPPS were made available to the patient.    Follow Up:   Next Medicare Wellness visit to be scheduled in 1 year.       Additional E&M Note during same encounter follows:  Patient has multiple medical problems which are significant and separately identifiable that require additional work above and beyond the Medicare Wellness Visit.      Chief Complaint  Medicare Wellness-subsequent, Depression, and Hypertension    Subjective        HPI  Loretta Ren is also being seen today for   Depression anxiety: She is doing very well with her citalopram.  She takes it every day.  No issues noted.  She is going to the allergist for her allergy shots first.  She is feeling better but still having a little bit  "of drainage at times.  She is willing to go back on her Protonix for her GERD.    She currently is seeing cardiology for her heart medicines.  Review of Systems   Constitutional: Negative for fatigue.   Respiratory: Negative for cough and shortness of breath.    Cardiovascular: Negative for chest pain and leg swelling.   Gastrointestinal: Negative for nausea and vomiting.   Psychiatric/Behavioral: Negative for hallucinations and suicidal ideas.       Objective   Vital Signs:  /82   Pulse 74   Temp 98.4 °F (36.9 °C)   Resp 16   Ht 154.9 cm (61\")   Wt 61.1 kg (134 lb 9.6 oz)   SpO2 98%   BMI 25.43 kg/m²     Physical Exam  Vitals reviewed.   Constitutional:       Appearance: Normal appearance. She is well-developed.   Cardiovascular:      Rate and Rhythm: Normal rate and regular rhythm.      Heart sounds: Normal heart sounds. No murmur heard.  Pulmonary:      Effort: Pulmonary effort is normal.      Breath sounds: Normal breath sounds.   Neurological:      Mental Status: She is alert and oriented to person, place, and time.      Cranial Nerves: No cranial nerve deficit.      Motor: No weakness.   Psychiatric:         Mood and Affect: Mood and affect normal.            Common labs        10/13/2022    09:08 4/6/2023    08:30   Common Labs   Glucose 137   128     BUN 12   9     Creatinine 0.65   0.66     Sodium 136   135     Potassium 4.6   4.3     Chloride 101   103     Calcium 9.0   9.1     Albumin 4.00   4.2     Total Bilirubin 0.7   0.7     Alkaline Phosphatase 75   80     AST (SGOT) 19   18     ALT (SGPT) 23   21     WBC 8.67   6.57     Hemoglobin 15.2   14.5     Hematocrit 43.2   42.1     Platelets 240   213     Total Cholesterol 163   175     Triglycerides 123   113     HDL Cholesterol 41   48     LDL Cholesterol  100   107     Hemoglobin A1C 7.00   7.10     Microalbumin, Urine <1.2   <1.2                  Assessment and Plan   Diagnoses and all orders for this visit:    1. Medicare annual wellness " visit, subsequent (Primary)    2. Generalized anxiety disorder  -     citalopram (CeleXA) 10 MG tablet; Take 1 tablet by mouth Daily.  Dispense: 90 tablet; Refill: 1    3. Essential hypertension  -     CBC & Differential; Future  -     Comprehensive Metabolic Panel; Future  -     TSH; Future  -     Lipid Panel; Future    4. Mixed hyperlipidemia  -     CBC & Differential; Future  -     Comprehensive Metabolic Panel; Future  -     TSH; Future  -     Lipid Panel; Future    5. Type 2 diabetes mellitus without complication, without long-term current use of insulin  -     CBC & Differential; Future  -     Comprehensive Metabolic Panel; Future  -     TSH; Future  -     Lipid Panel; Future  -     Hemoglobin A1c; Future  -     MicroAlbumin, Urine, Random - Urine, Clean Catch; Future    6. Gastroesophageal reflux disease without esophagitis  -     pantoprazole (Protonix) 40 MG EC tablet; Take 1 tablet by mouth Daily.  Dispense: 90 tablet; Refill: 1             Follow Up   Return in about 6 months (around 10/11/2023) for labs prior here in the office.  Patient was given instructions and counseling regarding her condition or for health maintenance advice. Please see specific information pulled into the AVS if appropriate.   Follow-up in 6 months for labs and appt. Call with any concerns or questions that you may have regarding your medications or history.    I have reviewed all medications and at this time no medications changes need to be adjusted for all chronic conditions.    Parts of this note are electronic transcriptions/translations of spoken language to printed text using the Dragon Dictation system.    Natalie Mukherjee, KIAN  04/11/2023

## 2023-04-24 ENCOUNTER — TELEPHONE (OUTPATIENT)
Dept: FAMILY MEDICINE CLINIC | Facility: CLINIC | Age: 68
End: 2023-04-24

## 2023-04-24 NOTE — TELEPHONE ENCOUNTER
Caller: Loretta Ren    Relationship: Self    Best call back number: [phone]    What is the best time to reach you: ANYTIME     Who are you requesting to speak with (clinical staff, provider,  specific staff member): CLINICAL/ CAMILA         What was the call regarding: PATIENT IS CALLING REQUESTING TO SPEAK WITH CAMILA.     Do you require a callback: YES

## 2023-04-25 ENCOUNTER — TELEPHONE (OUTPATIENT)
Dept: FAMILY MEDICINE CLINIC | Facility: CLINIC | Age: 68
End: 2023-04-25
Payer: MEDICARE

## 2023-04-25 NOTE — PROGRESS NOTES
GYN Problem/Follow Up Visit    Chief Complaint   Patient presents with   • Follow-up     Knot in Vaginal area with burning            HPI  Loretta Ren is a 68 y.o. female, , who presents for palpable lump left vulva first noticed 3 weeks ago when washing, has since resolved.  Recent vaginal burning sensation, took a mirror to vaginal opening and noticing a bulge of tissue  No vaginal bleeding, not sexually active, no increase or abnormal discharge    Mild constipation    Urinary urgency, voiding every 2-3 hours daytime, twice during the night  Limited caffeine intake    Additional OB/GYN History   No LMP recorded. Patient is postmenopausal.  Current contraception: contraceptive methods: Post menopausal status    Past Medical History:   Diagnosis Date   • Abnormal CT of the chest 2019   • Allergic rhinitis    • Allergic rhinitis due to allergen 2018   • Anxiety 2017    IMPROVED SINCE STARTING TRINTELLIX   • Anxiety disorder 2018   • Diabetes mellitus 2018    DIET CONTROLLED NO MEDS   • Dizziness 2017    THE PATIENT REPORTS A TRANSIENT BOUT OF DIZZINESS THAT SHE ATTRIBUTES TO STRESS. HER LAST BOUT OF DIZZINESS WAS IN 2017.  I DID REVIEW HER CDS WHICH WERE UNREVEALING. I WILL REQUEST RECORES TO BE SENT TO REVIEW   • DM2 (diabetes mellitus, type 2)    • Duodenal ulcer    • Facial pain 2017    THE PATIENT REPORTS A TRANSIENT BOUT OF FACIAL PAIN WITH RADIATION TOEARDS THE RIGHT EAR. SHE NOTES THAT THIS HAS RESOLVED. I DID PERSOANLLY REVIEW HER CT FACE WHICH WAS ESSENTIALLY UNREVELAING   • Fibrosis lung 2018   • Generalized anxiety disorder 2019   • GERD (gastroesophageal reflux disease) 2018   • Heart murmur     CONTROLLED   • Hemorrhoids    • Hyperlipidemia 2018   • Mitral valve disorder    • Osteopenia 2019   • PONV (postoperative nausea and vomiting)    • PVC (premature ventricular contraction) 2021   • Renal calculus or stone   "  • Sarcoidosis    • Sinus trouble    • Type 2 diabetes mellitus with hyperglycemia, without long-term current use of insulin 04/29/2019      Past Surgical History:   Procedure Laterality Date   • CHOLECYSTECTOMY     • COLONOSCOPY  2008    cologard 2018   • CYSTOSCOPY     • CYSTOSCOPY URETEROSCOPY Right 3/2/2022    Procedure: CYSTOSCOPY, RIGHT URETEROSCOPY, STONE BASKET EXTRACTION AND RIGHT URETERAL STENT INSERTION;  Surgeon: Deacon Rosales MD;  Location: [facility] OR;  Service: Urology;  Laterality: Right;   • CYSTOSCOPY W/ URETERAL STENT PLACEMENT Right 2/18/2022    Procedure: Cystoscopy  with right ureteral stent placement;  Surgeon: Deacon Rosales MD;  Location: [facility] OR;  Service: Urology;  Laterality: Right;   • ENDOSCOPY     • LUNG BIOPSY  1992    SARCOIDOSIS, NO CANCER   • URETEROSCOPY LASER LITHOTRIPSY WITH STENT INSERTION        Family History   Problem Relation Age of Onset   • Kidney cancer Father 78        bladder   • Breast cancer Mother 80   • Heart disease Mother    • Heart disease Daughter    • Malig Hyperthermia Neg Hx    • Ovarian cancer Neg Hx    • Uterine cancer Neg Hx    • Prostate cancer Neg Hx    • Colon cancer Neg Hx      Allergies as of 04/27/2023 - Reviewed 04/27/2023   Allergen Reaction Noted   • Aspirin Swelling 08/10/2021   • Ceftriaxone Rash 08/10/2021      The additional following portions of the patient's history were reviewed and updated as appropriate: allergies, current medications, past family history, past medical history, past social history, past surgical history and problem list.    Review of Systems    See HPI for pertinent ROS    Objective   /84   Pulse 87   Ht 154.9 cm (61\")   Wt 61.7 kg (136 lb)   BMI 25.70 kg/m²     Physical Exam  Vitals and nursing note reviewed. Exam conducted with a chaperone present.   Constitutional:       Appearance: Normal appearance.   Cardiovascular:      Rate and Rhythm: Normal rate.   Pulmonary:      Effort: Pulmonary " effort is normal.   Abdominal:      General: There is no distension.      Palpations: Abdomen is soft.      Tenderness: There is no right CVA tenderness or left CVA tenderness.   Genitourinary:     General: Normal vulva.      Vagina: No signs of injury and foreign body. No vaginal discharge, erythema, tenderness, bleeding or lesions. Other prolapse of vaginal walls w/o mention of uterine prolapse: atrophic, cystocele grade 1, rectocele grade 2.      Cervix: Normal.      Uterus: Normal.       Adnexa: Right adnexa normal and left adnexa normal.   Lymphadenopathy:      Lower Body: No right inguinal adenopathy. No left inguinal adenopathy.   Skin:     General: Skin is warm and dry.   Neurological:      Mental Status: She is alert and oriented to person, place, and time.            Assessment and Plan    Diagnoses and all orders for this visit:    1. Vaginal irritation (Primary)  -     Gardnerella vaginalis, Trichomonas vaginalis, Candida albicans, DNA - Swab, Vagina    2. Urinary frequency  -     POC Urinalysis Dipstick      Color   Date Value Ref Range Status   03/09/2022 Yellow Yellow, Straw, Dark Yellow, Pili Final     Clarity, UA   Date Value Ref Range Status   03/09/2022 Clear Clear Final     Specific Gravity    Date Value Ref Range Status   04/27/2023 1.015 1.005 - 1.030 Final     pH, Urine   Date Value Ref Range Status   04/27/2023 7.0 5.0 - 8.0 Final     Leukocytes   Date Value Ref Range Status   04/27/2023 Negative Negative Final     Nitrite, UA   Date Value Ref Range Status   04/27/2023 Negative Negative Final     Protein, POC   Date Value Ref Range Status   04/27/2023 Negative Negative mg/dL Final     Glucose, UA   Date Value Ref Range Status   04/27/2023 Negative Negative mg/dL Final     Ketones, UA   Date Value Ref Range Status   04/27/2023 15 mg/dL (A) Negative Final     Urobilinogen, UA   Date Value Ref Range Status   04/27/2023 Normal Normal, 0.2 E.U./dL Final     Bilirubin   Date Value Ref Range Status    04/27/2023 Negative Negative Final     Blood, UA   Date Value Ref Range Status   04/27/2023 Negative Negative Final     Lot Number   Date Value Ref Range Status   03/09/2023 708,[id number]  Final     Expiration Date   Date Value Ref Range Status   03/09/2023 06/30/2024  Final      Counseling:  • HRT R/B/A/SE/E all options including non-FDA approved options discussed w pt.         • HRT- I recommend the lowest dose possible, for the shortest period of time.  Risks HRT NLT breast CA (progestin), CVA, MI, TAQUERIA.    • All treatment options with regard to pt hx NLT hormonal, surgical, expectant R/B/A/SE/E   • PROLAPSE/urinary frequency discussed.  Reviewed risks, benefits, alternatives, side-effects, efficacy of options: expectant, kegels, biofeedback, pessary, denies treatment at this time, handout- kegels provided    Follow Up:  Return if symptoms worsen or fail to improve.        Nicole Mcmullen, APRN  04/27/2023

## 2023-04-25 NOTE — TELEPHONE ENCOUNTER
Pt stated she had previously told you she could not take this Rx bc it was not approved by [employer] but she was mistaken and it is. She is requesting this Rx be sent to [facility] as she needs to be on Pepcid for her allergy shots

## 2023-04-25 NOTE — TELEPHONE ENCOUNTER
Caller: Loretta Ren    Relationship: Self    Best call back number: [phone]    Requested Prescriptions:  PEPCID      Pharmacy where request should be sent: [facility] - [place], [place] - [address] - [phone]  - [phone] FX     Last office visit with prescribing clinician: 4/11/2023   Last telemedicine visit with prescribing clinician: 7/6/2023   Next office visit with prescribing clinician: 7/6/2023       Does the patient have less than a 3 day supply:  [x] Yes  [] No    Would you like a call back once the refill request has been completed: [x] Yes [] No    If the office needs to give you a call back, can they leave a voicemail: [x] Yes [] No    Ailyn Loyola Rep   04/25/23 16:12 EDT

## 2023-04-26 RX ORDER — FAMOTIDINE 20 MG/1
20 TABLET, FILM COATED ORAL 2 TIMES DAILY
Qty: 90 TABLET | Refills: 1 | Status: SHIPPED | OUTPATIENT
Start: 2023-04-26

## 2023-04-27 ENCOUNTER — OFFICE VISIT (OUTPATIENT)
Dept: OBSTETRICS AND GYNECOLOGY | Facility: CLINIC | Age: 68
End: 2023-04-27
Payer: MEDICARE

## 2023-04-27 VITALS
SYSTOLIC BLOOD PRESSURE: 132 MMHG | HEIGHT: 61 IN | HEART RATE: 87 BPM | BODY MASS INDEX: 25.68 KG/M2 | DIASTOLIC BLOOD PRESSURE: 84 MMHG | WEIGHT: 136 LBS

## 2023-04-27 DIAGNOSIS — N89.8 VAGINAL IRRITATION: Primary | ICD-10-CM

## 2023-04-27 DIAGNOSIS — R35.0 URINARY FREQUENCY: ICD-10-CM

## 2023-04-27 LAB
BILIRUB BLD-MCNC: NEGATIVE MG/DL
CANDIDA SPECIES: NEGATIVE
GARDNERELLA VAGINALIS: NEGATIVE
GLUCOSE UR STRIP-MCNC: NEGATIVE MG/DL
KETONES UR QL: ABNORMAL
LEUKOCYTE EST, POC: NEGATIVE
NITRITE UR-MCNC: NEGATIVE MG/ML
PH UR: 7 [PH] (ref 5–8)
PROT UR STRIP-MCNC: NEGATIVE MG/DL
RBC # UR STRIP: NEGATIVE /UL
SP GR UR: 1.01 (ref 1–1.03)
T VAGINALIS DNA VAG QL PROBE+SIG AMP: NEGATIVE
UROBILINOGEN UR QL: NORMAL

## 2023-04-27 PROCEDURE — 87480 CANDIDA DNA DIR PROBE: CPT | Performed by: NURSE PRACTITIONER

## 2023-04-27 PROCEDURE — 87660 TRICHOMONAS VAGIN DIR PROBE: CPT | Performed by: NURSE PRACTITIONER

## 2023-04-27 PROCEDURE — 87510 GARDNER VAG DNA DIR PROBE: CPT | Performed by: NURSE PRACTITIONER

## 2023-04-27 RX ORDER — EPINEPHRINE 0.3 MG/.3ML
INJECTION SUBCUTANEOUS
COMMUNITY
Start: 2023-03-14

## 2023-05-25 ENCOUNTER — HOSPITAL ENCOUNTER (OUTPATIENT)
Dept: MAMMOGRAPHY | Facility: HOSPITAL | Age: 68
Discharge: HOME | End: 2023-05-25
Admitting: NURSE PRACTITIONER
Payer: MEDICARE

## 2023-05-25 DIAGNOSIS — Z12.31 VISIT FOR SCREENING MAMMOGRAM: ICD-10-CM

## 2023-05-25 PROCEDURE — 77063 BREAST TOMOSYNTHESIS BI: CPT

## 2023-05-25 PROCEDURE — 77067 SCR MAMMO BI INCL CAD: CPT

## 2023-08-02 ENCOUNTER — TELEPHONE (OUTPATIENT)
Dept: CARDIOLOGY | Facility: CLINIC | Age: 68
End: 2023-08-02
Payer: MEDICARE

## 2023-08-10 ENCOUNTER — OFFICE VISIT (OUTPATIENT)
Dept: CARDIOLOGY | Facility: CLINIC | Age: 68
End: 2023-08-10
Payer: MEDICARE

## 2023-08-10 VITALS
HEART RATE: 88 BPM | HEIGHT: 61 IN | WEIGHT: 135 LBS | BODY MASS INDEX: 25.49 KG/M2 | DIASTOLIC BLOOD PRESSURE: 69 MMHG | SYSTOLIC BLOOD PRESSURE: 113 MMHG

## 2023-08-10 DIAGNOSIS — I49.3 PVC (PREMATURE VENTRICULAR CONTRACTION): Primary | ICD-10-CM

## 2023-08-10 PROCEDURE — 99213 OFFICE O/P EST LOW 20 MIN: CPT | Performed by: INTERNAL MEDICINE

## 2023-08-10 NOTE — ASSESSMENT & PLAN NOTE
Patient is advised stable continue Coreg 3.125 twice daily patient can follow-up on as-needed basis

## 2023-08-10 NOTE — PROGRESS NOTES
Chief Complaint  Follow-up, Hypertension, PVC, and Hyperlipidemia    Subjective    Patient has been symptomatically stable with occasional heart palpitations no other big issues or problems  Past Medical History:   Diagnosis Date    Abnormal CT of the chest 04/29/2019    Allergic rhinitis     Allergic rhinitis due to allergen 11/06/2018    Anxiety 11/06/2017    IMPROVED SINCE STARTING TRINTELLIX    Anxiety disorder 11/06/2018    Diabetes mellitus 11/06/2018    DIET CONTROLLED NO MEDS    Dizziness 11/06/2017    THE PATIENT REPORTS A TRANSIENT BOUT OF DIZZINESS THAT SHE ATTRIBUTES TO STRESS. HER LAST BOUT OF DIZZINESS WAS IN JULY 2017.  I DID REVIEW HER CDS WHICH WERE UNREVEALING. I WILL REQUEST RECORES TO BE SENT TO REVIEW    DM2 (diabetes mellitus, type 2)     Duodenal ulcer     Facial pain 11/06/2017    THE PATIENT REPORTS A TRANSIENT BOUT OF FACIAL PAIN WITH RADIATION TOEARDS THE RIGHT EAR. SHE NOTES THAT THIS HAS RESOLVED. 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    Generalized anxiety disorder 04/29/2019    GERD (gastroesophageal reflux disease) 11/06/2018    Heart murmur     CONTROLLED    Hemorrhoids     Hyperlipidemia 11/06/2018    Mitral valve disorder     Osteopenia 04/29/2019    PONV (postoperative nausea and vomiting)     PVC (premature ventricular contraction) 08/04/2021    Renal calculus or stone     Sarcoidosis     Sinus trouble     Type 2 diabetes mellitus with hyperglycemia, without long-term current use of insulin 04/29/2019         Current Outpatient Medications:     Carboxymethylcellulose Sodium (REFRESH TEARS OP), Apply  to eye(s) as directed by provider., Disp: , Rfl:     carvedilol (COREG) 3.125 MG tablet, TAKE ONE TABLET BY MOUTH TWICE DAILY, Disp: 90 tablet, Rfl: 3    citalopram (CeleXA) 10 MG tablet, Take 1 tablet by mouth Daily., Disp: 90 tablet, Rfl: 0    EPINEPHrine (EPIPEN) 0.3 MG/0.3ML solution auto-injector injection, USE AS  "DIRECTED FOR acute allergic reaction, Disp: , Rfl:     famotidine (Pepcid) 20 MG tablet, Take 1 tablet by mouth 2 (Two) Times a Day., Disp: 90 tablet, Rfl: 1    fluticasone (FLONASE) 50 MCG/ACT nasal spray, 2 sprays into the nostril(s) as directed by provider Daily As Needed for Allergies or Rhinitis., Disp: 16 g, Rfl: 5    ipratropium (ATROVENT) 0.03 % nasal spray, 2 sprays into the nostril(s) as directed by provider 4 (Four) Times a Day., Disp: , Rfl:     loratadine (CLARITIN) 10 MG tablet, Take 1 tablet by mouth Daily As Needed., Disp: , Rfl:     multivitamin with minerals tablet tablet, Take 1 tablet by mouth Daily., Disp: , Rfl:     pantoprazole (Protonix) 40 MG EC tablet, Take 1 tablet by mouth Daily., Disp: 90 tablet, Rfl: 1    There are no discontinued medications.  Allergies   Allergen Reactions    Aspirin Swelling    Ceftriaxone Rash        Social History     Tobacco Use    Smoking status: Never    Smokeless tobacco: Never   Vaping Use    Vaping Use: Never used   Substance Use Topics    Alcohol use: Never    Drug use: Never       Family History   Problem Relation Age of Onset    Kidney cancer Father 78        bladder    Breast cancer Mother 80    Heart disease Mother     Heart disease Daughter     Malig Hyperthermia Neg Hx     Ovarian cancer Neg Hx     Uterine cancer Neg Hx     Prostate cancer Neg Hx     Colon cancer Neg Hx         Objective     /69   Pulse 88   Ht 154.9 cm (61\")   Wt 61.2 kg (135 lb)   BMI 25.51 kg/mý       Physical Exam    General Appearance:   no acute distress  Alert and oriented x3  HENT:   lips not cyanotic  Atraumatic  Neck:  No jvd   supple  Respiratory:  no respiratory distress  normal breath sounds  no rales  Cardiovascular:  Regular rate and rhythm  no S3, no S4   no murmur  no rub  Extremities  No cyanosis  lower extremity edema: none    Skin:   warm, dry  No rashes      Result Review :     No results found for: PROBNP  CMP          10/13/2022    09:08 4/6/2023    " 08:30   CMP   Glucose 137  128    BUN 12  9    Creatinine 0.65  0.66    EGFR 95.4  95.1    Sodium 136  135    Potassium 4.6  4.3    Chloride 101  103    Calcium 9.0  9.1    Total Protein 7.0  6.9    Albumin 4.00  4.2    Globulin 3.0  2.7    Total Bilirubin 0.7  0.7    Alkaline Phosphatase 75  80    AST (SGOT) 19  18    ALT (SGPT) 23  21    Albumin/Globulin Ratio 1.3  1.6    BUN/Creatinine Ratio 18.5  13.6    Anion Gap 12.5  9.0      CBC w/diff          10/13/2022    09:08 4/6/2023    08:30   CBC w/Diff   WBC 8.67  6.57    RBC 4.98  4.68    Hemoglobin 15.2  14.5    Hematocrit 43.2  42.1    MCV 86.7  90.0    MCH 30.5  31.0    MCHC 35.2  34.4    RDW 12.5  12.6    Platelets 240  213    Neutrophil Rel % 66.0  63.8    Immature Granulocyte Rel % 0.5  0.2    Lymphocyte Rel % 22.3  25.6    Monocyte Rel % 9.6  8.4    Eosinophil Rel % 1.4  1.8    Basophil Rel % 0.2  0.2       Lab Results   Component Value Date    TSH 1.160 04/06/2023      Lab Results   Component Value Date    FREET4 1.31 09/27/2021      No results found for: DDIMERQUANT  Magnesium   Date Value Ref Range Status   01/18/2019 2.13 1.60 - 2.30 mg/dL Final      No results found for: DIGOXIN   No results found for: TROPONINT        Lipid Panel          10/13/2022    09:08 4/6/2023    08:30   Lipid Panel   Total Cholesterol 163  175    Triglycerides 123  113    HDL Cholesterol 41  48    VLDL Cholesterol 22  20    LDL Cholesterol  100  107    LDL/HDL Ratio 2.38  2.18      No results found for: POCTROP    Results for orders placed in visit on 08/01/23    Adult Transthoracic Echo Complete W/ Cont if Necessary Per Protocol    Interpretation Summary    Left ventricular systolic function is normal. Calculated left ventricular EF = 55.9%    Left ventricular diastolic function was normal.    Estimated right ventricular systolic pressure from tricuspid regurgitation is normal (<35 mmHg).                 Diagnoses and all orders for this visit:    1. PVC (premature ventricular  contraction) (Primary)  Assessment & Plan:  Patient is advised stable continue Coreg 3.125 twice daily patient can follow-up on as-needed basis              Follow Up     No follow-ups on file.          Patient was given instructions and counseling regarding her condition or for health maintenance advice. Please see specific information pulled into the AVS if appropriate.

## 2023-08-22 DIAGNOSIS — I10 ESSENTIAL HYPERTENSION: ICD-10-CM

## 2023-08-22 RX ORDER — CARVEDILOL 3.12 MG/1
TABLET ORAL
Qty: 90 TABLET | Refills: 3 | Status: SHIPPED | OUTPATIENT
Start: 2023-08-22

## 2023-09-19 DIAGNOSIS — F41.1 GENERALIZED ANXIETY DISORDER: ICD-10-CM

## 2023-09-19 RX ORDER — CITALOPRAM HYDROBROMIDE 10 MG/1
TABLET ORAL
Qty: 90 TABLET | Refills: 0 | OUTPATIENT
Start: 2023-09-19

## 2023-10-02 ENCOUNTER — LAB (OUTPATIENT)
Dept: FAMILY MEDICINE CLINIC | Facility: CLINIC | Age: 68
End: 2023-10-02
Payer: MEDICARE

## 2023-10-02 DIAGNOSIS — E11.9 TYPE 2 DIABETES MELLITUS WITHOUT COMPLICATION, WITHOUT LONG-TERM CURRENT USE OF INSULIN: ICD-10-CM

## 2023-10-02 DIAGNOSIS — E78.2 MIXED HYPERLIPIDEMIA: ICD-10-CM

## 2023-10-02 DIAGNOSIS — Z23 NEED FOR INFLUENZA VACCINATION: Primary | ICD-10-CM

## 2023-10-02 DIAGNOSIS — I10 ESSENTIAL HYPERTENSION: ICD-10-CM

## 2023-10-02 LAB
ALBUMIN SERPL-MCNC: 4.4 G/DL (ref 3.5–5.2)
ALBUMIN UR-MCNC: <1.2 MG/DL
ALBUMIN/GLOB SERPL: 1.7 G/DL
ALP SERPL-CCNC: 77 U/L (ref 39–117)
ALT SERPL W P-5'-P-CCNC: 24 U/L (ref 1–33)
ANION GAP SERPL CALCULATED.3IONS-SCNC: 11.1 MMOL/L (ref 5–15)
AST SERPL-CCNC: 21 U/L (ref 1–32)
BASOPHILS # BLD AUTO: 0.01 10*3/MM3 (ref 0–0.2)
BASOPHILS NFR BLD AUTO: 0.2 % (ref 0–1.5)
BILIRUB SERPL-MCNC: 0.7 MG/DL (ref 0–1.2)
BUN SERPL-MCNC: 11 MG/DL (ref 8–23)
BUN/CREAT SERPL: 17.2 (ref 7–25)
CALCIUM SPEC-SCNC: 9.4 MG/DL (ref 8.6–10.5)
CHLORIDE SERPL-SCNC: 102 MMOL/L (ref 98–107)
CHOLEST SERPL-MCNC: 175 MG/DL (ref 0–200)
CO2 SERPL-SCNC: 24.9 MMOL/L (ref 22–29)
CREAT SERPL-MCNC: 0.64 MG/DL (ref 0.57–1)
DEPRECATED RDW RBC AUTO: 41.3 FL (ref 37–54)
EGFRCR SERPLBLD CKD-EPI 2021: 95.2 ML/MIN/1.73
EOSINOPHIL # BLD AUTO: 0.11 10*3/MM3 (ref 0–0.4)
EOSINOPHIL NFR BLD AUTO: 1.8 % (ref 0.3–6.2)
ERYTHROCYTE [DISTWIDTH] IN BLOOD BY AUTOMATED COUNT: 12.6 % (ref 12.3–15.4)
GLOBULIN UR ELPH-MCNC: 2.6 GM/DL
GLUCOSE SERPL-MCNC: 121 MG/DL (ref 65–99)
HBA1C MFR BLD: 7 % (ref 4.8–5.6)
HCT VFR BLD AUTO: 45.3 % (ref 34–46.6)
HDLC SERPL-MCNC: 46 MG/DL (ref 40–60)
HGB BLD-MCNC: 15.5 G/DL (ref 12–15.9)
IMM GRANULOCYTES # BLD AUTO: 0.01 10*3/MM3 (ref 0–0.05)
IMM GRANULOCYTES NFR BLD AUTO: 0.2 % (ref 0–0.5)
LDLC SERPL CALC-MCNC: 110 MG/DL (ref 0–100)
LDLC/HDLC SERPL: 2.35 {RATIO}
LYMPHOCYTES # BLD AUTO: 1.38 10*3/MM3 (ref 0.7–3.1)
LYMPHOCYTES NFR BLD AUTO: 22.7 % (ref 19.6–45.3)
MCH RBC QN AUTO: 30.9 PG (ref 26.6–33)
MCHC RBC AUTO-ENTMCNC: 34.2 G/DL (ref 31.5–35.7)
MCV RBC AUTO: 90.2 FL (ref 79–97)
MONOCYTES # BLD AUTO: 0.43 10*3/MM3 (ref 0.1–0.9)
MONOCYTES NFR BLD AUTO: 7.1 % (ref 5–12)
NEUTROPHILS NFR BLD AUTO: 4.13 10*3/MM3 (ref 1.7–7)
NEUTROPHILS NFR BLD AUTO: 68 % (ref 42.7–76)
NRBC BLD AUTO-RTO: 0 /100 WBC (ref 0–0.2)
PLATELET # BLD AUTO: 203 10*3/MM3 (ref 140–450)
PMV BLD AUTO: 11.6 FL (ref 6–12)
POTASSIUM SERPL-SCNC: 4.5 MMOL/L (ref 3.5–5.2)
PROT SERPL-MCNC: 7 G/DL (ref 6–8.5)
RBC # BLD AUTO: 5.02 10*6/MM3 (ref 3.77–5.28)
SODIUM SERPL-SCNC: 138 MMOL/L (ref 136–145)
TRIGL SERPL-MCNC: 105 MG/DL (ref 0–150)
TSH SERPL DL<=0.05 MIU/L-ACNC: 1.27 UIU/ML (ref 0.27–4.2)
VLDLC SERPL-MCNC: 19 MG/DL (ref 5–40)
WBC NRBC COR # BLD: 6.07 10*3/MM3 (ref 3.4–10.8)

## 2023-10-02 PROCEDURE — 80061 LIPID PANEL: CPT | Performed by: NURSE PRACTITIONER

## 2023-10-02 PROCEDURE — 84443 ASSAY THYROID STIM HORMONE: CPT | Performed by: NURSE PRACTITIONER

## 2023-10-02 PROCEDURE — 85025 COMPLETE CBC W/AUTO DIFF WBC: CPT | Performed by: NURSE PRACTITIONER

## 2023-10-02 PROCEDURE — 83036 HEMOGLOBIN GLYCOSYLATED A1C: CPT | Performed by: NURSE PRACTITIONER

## 2023-10-02 PROCEDURE — 90662 IIV NO PRSV INCREASED AG IM: CPT | Performed by: NURSE PRACTITIONER

## 2023-10-02 PROCEDURE — 80053 COMPREHEN METABOLIC PANEL: CPT | Performed by: NURSE PRACTITIONER

## 2023-10-02 PROCEDURE — G0008 ADMIN INFLUENZA VIRUS VAC: HCPCS | Performed by: NURSE PRACTITIONER

## 2023-10-02 PROCEDURE — 36415 COLL VENOUS BLD VENIPUNCTURE: CPT | Performed by: NURSE PRACTITIONER

## 2023-10-02 PROCEDURE — 82043 UR ALBUMIN QUANTITATIVE: CPT | Performed by: NURSE PRACTITIONER

## 2023-10-06 ENCOUNTER — TELEPHONE (OUTPATIENT)
Dept: FAMILY MEDICINE CLINIC | Facility: CLINIC | Age: 68
End: 2023-10-06
Payer: MEDICARE

## 2023-10-06 DIAGNOSIS — F41.1 GENERALIZED ANXIETY DISORDER: ICD-10-CM

## 2023-10-06 RX ORDER — CITALOPRAM HYDROBROMIDE 10 MG/1
10 TABLET ORAL DAILY
Qty: 30 TABLET | Refills: 0 | Status: SHIPPED | OUTPATIENT
Start: 2023-10-06

## 2023-10-06 NOTE — TELEPHONE ENCOUNTER
Needs celexa called in only has enough for the beginning of next week    Has an appt on 10/24/23    Send to [place]

## 2023-10-24 ENCOUNTER — OFFICE VISIT (OUTPATIENT)
Dept: FAMILY MEDICINE CLINIC | Facility: CLINIC | Age: 68
End: 2023-10-24
Payer: MEDICARE

## 2023-10-24 VITALS
OXYGEN SATURATION: 96 % | HEART RATE: 73 BPM | BODY MASS INDEX: 25.73 KG/M2 | WEIGHT: 136.3 LBS | HEIGHT: 61 IN | TEMPERATURE: 97.5 F | RESPIRATION RATE: 18 BRPM | SYSTOLIC BLOOD PRESSURE: 115 MMHG | DIASTOLIC BLOOD PRESSURE: 64 MMHG

## 2023-10-24 DIAGNOSIS — E78.2 MIXED HYPERLIPIDEMIA: ICD-10-CM

## 2023-10-24 DIAGNOSIS — K21.9 GASTROESOPHAGEAL REFLUX DISEASE WITHOUT ESOPHAGITIS: ICD-10-CM

## 2023-10-24 DIAGNOSIS — I49.3 PVC (PREMATURE VENTRICULAR CONTRACTION): ICD-10-CM

## 2023-10-24 DIAGNOSIS — F41.1 GENERALIZED ANXIETY DISORDER: ICD-10-CM

## 2023-10-24 DIAGNOSIS — E11.9 TYPE 2 DIABETES MELLITUS WITHOUT COMPLICATION, WITHOUT LONG-TERM CURRENT USE OF INSULIN: Primary | ICD-10-CM

## 2023-10-24 RX ORDER — FAMOTIDINE 20 MG/1
20 TABLET, FILM COATED ORAL 2 TIMES DAILY
Qty: 90 TABLET | Refills: 1 | Status: SHIPPED | OUTPATIENT
Start: 2023-10-24

## 2023-10-24 RX ORDER — PANTOPRAZOLE SODIUM 40 MG/1
40 TABLET, DELAYED RELEASE ORAL DAILY
Qty: 90 TABLET | Refills: 1 | Status: SHIPPED | OUTPATIENT
Start: 2023-10-24

## 2023-10-24 RX ORDER — CITALOPRAM HYDROBROMIDE 10 MG/1
10 TABLET ORAL DAILY
Qty: 90 TABLET | Refills: 1 | Status: SHIPPED | OUTPATIENT
Start: 2023-10-24

## 2023-10-24 NOTE — PROGRESS NOTES
Chief Complaint  Depression    Subjective          Loretta Ren presents to [facility]  History of Present Illness  KARSTEN:  She is currently on celexa and is doing good.  GERD:  She is doing good with the pepcid and protonix.    Allergies:  She is doing her allergy shots and her reg meds too  PVC:  She is under cardiology care for Coreg.  DM:  She is still watching diet.    LIPID:  No meds overall ok with diet.  Depression: Not at risk (4/11/2023)    PHQ-2     PHQ-2 Score: 0    and 4/11/2023               Allergies  Aspirin and Ceftriaxone    Social History     Tobacco Use    Smoking status: Never    Smokeless tobacco: Never   Vaping Use    Vaping Use: Never used   Substance Use Topics    Alcohol use: Never    Drug use: Never       Family History   Problem Relation Age of Onset    Kidney cancer Father 78        bladder    Breast cancer Mother 80    Heart disease Mother     Heart disease Daughter     Malig Hyperthermia Neg Hx     Ovarian cancer Neg Hx     Uterine cancer Neg Hx     Prostate cancer Neg Hx     Colon cancer Neg Hx         Health Maintenance Due   Topic Date Due    DIABETIC FOOT EXAM  Never done    DIABETIC EYE EXAM  03/31/2023        Immunization History   Administered Date(s) Administered    Flu Vaccine Quad PF >36MO 10/06/2015, 12/01/2016, 10/03/2017, 10/10/2019, 10/06/2020    Fluzone High-Dose 65+yrs 09/30/2021, 10/17/2022, 10/02/2023    Fluzone Quad >6mos (Multi-dose) 10/19/2018    Influenza TIV (IM) 10/31/2014    Influenza, Unspecified 10/06/2020    PEDS-Pneumococcal Conjugate (PCV7) 10/31/2014    Pneumococcal Conjugate Unspecified 10/31/2014    Pneumococcal Polysaccharide (PPSV23) 10/31/2014    Shingrix 12/11/2014    Zostavax 12/11/2014       Review of Systems   Constitutional:  Negative for fatigue.   Respiratory:  Negative for cough and shortness of breath.    Cardiovascular:  Negative for chest pain.   Gastrointestinal:  Negative for diarrhea, nausea and  "vomiting.        Objective       Vitals:    10/24/23 1340   BP: 115/64   Pulse: 73   Resp: 18   Temp: 97.5 °F (36.4 °C)   SpO2: 96%   Weight: 61.8 kg (136 lb 4.8 oz)   Height: 154.9 cm (61\")       Body mass index is 25.75 kg/m².         Physical Exam  Vitals reviewed.   Constitutional:       Appearance: Normal appearance. She is well-developed.   Cardiovascular:      Rate and Rhythm: Normal rate and regular rhythm.      Heart sounds: Normal heart sounds. No murmur heard.  Pulmonary:      Effort: Pulmonary effort is normal.      Breath sounds: Normal breath sounds.   Musculoskeletal:      Right lower leg: No edema.      Left lower leg: No edema.   Neurological:      Mental Status: She is alert and oriented to person, place, and time.      Cranial Nerves: No cranial nerve deficit.      Motor: No weakness.   Psychiatric:         Mood and Affect: Mood and affect normal.             Result Review :     The following data was reviewed by: KIAN Lyons on 10/24/2023:    Common Labs   Common labs          4/6/2023    08:30 10/2/2023    08:28   Common Labs   Glucose 128  121    BUN 9  11    Creatinine 0.66  0.64    Sodium 135  138    Potassium 4.3  4.5    Chloride 103  102    Calcium 9.1  9.4    Albumin 4.2  4.4    Total Bilirubin 0.7  0.7    Alkaline Phosphatase 80  77    AST (SGOT) 18  21    ALT (SGPT) 21  24    WBC 6.57  6.07    Hemoglobin 14.5  15.5    Hematocrit 42.1  45.3    Platelets 213  203    Total Cholesterol 175  175    Triglycerides 113  105    HDL Cholesterol 48  46    LDL Cholesterol  107  110    Hemoglobin A1C 7.10  7.00    Microalbumin, Urine <1.2  <1.2                     Assessment and Plan      Diagnoses and all orders for this visit:    1. Type 2 diabetes mellitus without complication, without long-term current use of insulin (Primary)  -     CBC & Differential; Future  -     Comprehensive Metabolic Panel; Future  -     TSH; Future  -     Lipid Panel; Future  -     Hemoglobin A1c; Future  - "     MicroAlbumin, Urine, Random - Urine, Clean Catch; Future    2. Gastroesophageal reflux disease without esophagitis  -     pantoprazole (Protonix) 40 MG EC tablet; Take 1 tablet by mouth Daily.  Dispense: 90 tablet; Refill: 1  -     famotidine (Pepcid) 20 MG tablet; Take 1 tablet by mouth 2 (Two) Times a Day.  Dispense: 90 tablet; Refill: 1    3. Generalized anxiety disorder  -     citalopram (CeleXA) 10 MG tablet; Take 1 tablet by mouth Daily.  Dispense: 90 tablet; Refill: 1    4. Mixed hyperlipidemia  -     CBC & Differential; Future  -     Comprehensive Metabolic Panel; Future  -     TSH; Future  -     Lipid Panel; Future    5. PVC (premature ventricular contraction)  -     CBC & Differential; Future  -     Comprehensive Metabolic Panel; Future  -     TSH; Future  -     Lipid Panel; Future            Follow Up     Return in about 6 months (around 4/24/2024).  Follow-up in 6 months for labs and appt. Call with any concerns or questions that you may have regarding your medications or history.    I have reviewed all medications and at this time no medications changes need to be adjusted for all chronic conditions.  She said cardio told her I can write her med if needed.  Patient was given instructions and counseling regarding her condition or for health maintenance advice. Please see specific information pulled into the AVS if appropriate.     Parts of this note are electronic transcriptions/translations of spoken language to printed text using the Dragon Dictation system.          Natalie Mukherjee, KIAN  10/24/2023

## 2024-01-25 NOTE — PROGRESS NOTES
"Chief Complaint  Abdominal Pain (Upper abdominal pain, possible hernia )    Subjective          Loretta Ren, 68 y.o. female presents to [facility]  History of Present Illness   She is a patient KIAN Lyons.  She is complaining of her stomach epigastric area hurting.  She states she has a hiatal hernia.  She states she is taking pantoprazole daily, every morning and she is taking Pepcid every evening.  She states she is having increased in acid reflux and pain.  She states that her throat also hurts all the time.    PHQ-2 Depression Screening  Little interest or pleasure in doing things? 0-->not at all   Feeling down, depressed, or hopeless? 0-->not at all   PHQ-2 Total Score 0          Tobacco Use: Low Risk  (1/26/2024)    Patient History     Smoking Tobacco Use: Never     Smokeless Tobacco Use: Never     Passive Exposure: Not on file      Objective   Vital Signs:   /69 (BP Location: Left arm, Patient Position: Sitting, Cuff Size: Adult)   Pulse 77   Temp 97.5 °F (36.4 °C)   Ht 154.9 cm (61\")   Wt 62.9 kg (138 lb 11.2 oz)   SpO2 98%   BMI 26.21 kg/m²       Current Outpatient Medications:     Carboxymethylcellulose Sodium (REFRESH TEARS OP), Apply  to eye(s) as directed by provider., Disp: , Rfl:     carvedilol (COREG) 3.125 MG tablet, TAKE ONE TABLET BY MOUTH TWICE DAILY, Disp: 90 tablet, Rfl: 3    citalopram (CeleXA) 10 MG tablet, Take 1 tablet by mouth Daily., Disp: 90 tablet, Rfl: 1    EPINEPHrine (EPIPEN) 0.3 MG/0.3ML solution auto-injector injection, USE AS DIRECTED FOR acute allergic reaction, Disp: , Rfl:     famotidine (Pepcid) 20 MG tablet, Take 1 tablet by mouth 2 (Two) Times a Day., Disp: 90 tablet, Rfl: 1    fluticasone (FLONASE) 50 MCG/ACT nasal spray, 2 sprays into the nostril(s) as directed by provider Daily As Needed for Allergies or Rhinitis., Disp: 16 g, Rfl: 5    ipratropium (ATROVENT) 0.03 % nasal spray, 2 sprays into the nostril(s) " as directed by provider 4 (Four) Times a Day., Disp: , Rfl:     loratadine (CLARITIN) 10 MG tablet, Take 1 tablet by mouth Daily As Needed., Disp: , Rfl:     multivitamin with minerals tablet tablet, Take 1 tablet by mouth Daily., Disp: , Rfl:     pantoprazole (Protonix) 40 MG EC tablet, Take 1 tablet by mouth Daily., Disp: 90 tablet, Rfl: 1    sucralfate (Carafate) 1 g tablet, Take 1 tablet by mouth 4 (Four) Times a Day for 14 days., Disp: 56 tablet, Rfl: 0   Past Medical History:   Diagnosis Date    Abnormal CT of the chest 04/29/2019    Allergic rhinitis     Allergic rhinitis due to allergen 11/06/2018    Anxiety 11/06/2017    IMPROVED SINCE STARTING TRINTELLIX    Anxiety disorder 11/06/2018    Diabetes mellitus 11/06/2018    DIET CONTROLLED NO MEDS    Dizziness 11/06/2017    THE PATIENT REPORTS A TRANSIENT BOUT OF DIZZINESS THAT SHE ATTRIBUTES TO STRESS. HER LAST BOUT OF DIZZINESS WAS IN JULY 2017.  I DID REVIEW HER CDS WHICH WERE UNREVEALING. I WILL REQUEST RECORES TO BE SENT TO REVIEW    DM2 (diabetes mellitus, type 2)     Duodenal ulcer     Facial pain 11/06/2017    THE PATIENT REPORTS A TRANSIENT BOUT OF FACIAL PAIN WITH RADIATION TOEARDS THE RIGHT EAR. SHE NOTES THAT THIS HAS RESOLVED. 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    Generalized anxiety disorder 04/29/2019    GERD (gastroesophageal reflux disease) 11/06/2018    Heart murmur     CONTROLLED    Hemorrhoids     Hyperlipidemia 11/06/2018    Mitral valve disorder     Osteopenia 04/29/2019    PONV (postoperative nausea and vomiting)     PVC (premature ventricular contraction) 08/04/2021    Renal calculus or stone     Sarcoidosis     Sinus trouble     Type 2 diabetes mellitus with hyperglycemia, without long-term current use of insulin 04/29/2019      Physical Exam  Vitals reviewed.   Constitutional:       Appearance: Normal appearance. She is well-developed.   HENT:      Mouth/Throat:       Pharynx: No pharyngeal swelling, oropharyngeal exudate or posterior oropharyngeal erythema.   Neck:      Thyroid: No thyroid mass, thyromegaly or thyroid tenderness.   Cardiovascular:      Rate and Rhythm: Normal rate and regular rhythm.      Heart sounds: No murmur heard.     No friction rub. No gallop.   Pulmonary:      Effort: Pulmonary effort is normal.      Breath sounds: Normal breath sounds. No wheezing or rhonchi.   Abdominal:      Palpations: Abdomen is soft.      Tenderness:  in the epigastric area   Lymphadenopathy:      Cervical: No cervical adenopathy.   Skin:     General: Skin is warm and dry.   Neurological:      Mental Status: She is alert and oriented to person, place, and time.      Cranial Nerves: No cranial nerve deficit.   Psychiatric:         Mood and Affect: Mood and affect normal.         Behavior: Behavior normal.         Thought Content: Thought content normal. Thought content does not include homicidal or suicidal ideation.         Judgment: Judgment normal.                 Assessment and Plan    Diagnoses and all orders for this visit:    1. Hiatal hernia with gastroesophageal reflux disease and esophagitis (Primary)  -     Ambulatory Referral to Gastroenterology  -     sucralfate (Carafate) 1 g tablet; Take 1 tablet by mouth 4 (Four) Times a Day for 14 days.  Dispense: 56 tablet; Refill: 0    I recommend that she have an upper endoscopy.  I will refer her to gastroenterology.  I will also start her on Carafate 1 g tablet 4 times daily, advised to take 1 hour prior to meals and at bedtime.  Patient verbalizes understanding.    Follow Up   Return if symptoms worsen or fail to improve.  Patient was given instructions and counseling regarding her condition or for health maintenance advice. Please see specific information pulled into the AVS if appropriate.     Parts of this note are electronic transcriptions/translations of spoken language to printed text using the Dragon Dictation  system.      Magaly Dill, APRN  01/26/2024

## 2024-01-26 ENCOUNTER — OFFICE VISIT (OUTPATIENT)
Dept: FAMILY MEDICINE CLINIC | Facility: CLINIC | Age: 69
End: 2024-01-26
Payer: MEDICARE

## 2024-01-26 VITALS
WEIGHT: 138.7 LBS | HEIGHT: 61 IN | OXYGEN SATURATION: 98 % | HEART RATE: 77 BPM | SYSTOLIC BLOOD PRESSURE: 122 MMHG | DIASTOLIC BLOOD PRESSURE: 69 MMHG | BODY MASS INDEX: 26.19 KG/M2 | TEMPERATURE: 97.5 F

## 2024-01-26 DIAGNOSIS — K21.00 HIATAL HERNIA WITH GASTROESOPHAGEAL REFLUX DISEASE AND ESOPHAGITIS: Primary | ICD-10-CM

## 2024-01-26 DIAGNOSIS — K44.9 HIATAL HERNIA WITH GASTROESOPHAGEAL REFLUX DISEASE AND ESOPHAGITIS: Primary | ICD-10-CM

## 2024-01-26 PROCEDURE — 1159F MED LIST DOCD IN RCRD: CPT | Performed by: NURSE PRACTITIONER

## 2024-01-26 PROCEDURE — 1160F RVW MEDS BY RX/DR IN RCRD: CPT | Performed by: NURSE PRACTITIONER

## 2024-01-26 PROCEDURE — 99213 OFFICE O/P EST LOW 20 MIN: CPT | Performed by: NURSE PRACTITIONER

## 2024-01-26 RX ORDER — SUCRALFATE 1 G/1
1 TABLET ORAL 4 TIMES DAILY
Qty: 56 TABLET | Refills: 0 | Status: SHIPPED | OUTPATIENT
Start: 2024-01-26 | End: 2024-02-09

## 2024-02-09 ENCOUNTER — TELEPHONE (OUTPATIENT)
Dept: GASTROENTEROLOGY | Facility: CLINIC | Age: 69
End: 2024-02-09

## 2024-02-09 NOTE — TELEPHONE ENCOUNTER
Provider: KIAN CHE    Caller: ZACHERY DIXON    Relationship to Patient: SELF    Phone Number: [phone]    Reason for Call: PT WOULD LIKE AN APPT REMINDER SENT IN THE MAIL

## 2024-02-14 ENCOUNTER — OFFICE VISIT (OUTPATIENT)
Dept: GASTROENTEROLOGY | Facility: CLINIC | Age: 69
End: 2024-02-14
Payer: MEDICARE

## 2024-02-14 VITALS
HEIGHT: 61 IN | SYSTOLIC BLOOD PRESSURE: 118 MMHG | WEIGHT: 139 LBS | HEART RATE: 97 BPM | BODY MASS INDEX: 26.24 KG/M2 | DIASTOLIC BLOOD PRESSURE: 70 MMHG

## 2024-02-14 DIAGNOSIS — K21.9 GASTROESOPHAGEAL REFLUX DISEASE WITHOUT ESOPHAGITIS: ICD-10-CM

## 2024-02-14 DIAGNOSIS — R10.13 EPIGASTRIC PAIN: Primary | ICD-10-CM

## 2024-02-14 DIAGNOSIS — R12 HEARTBURN: ICD-10-CM

## 2024-02-14 RX ORDER — FAMOTIDINE 40 MG/1
40 TABLET, FILM COATED ORAL
Qty: 90 TABLET | Refills: 1 | Status: SHIPPED | OUTPATIENT
Start: 2024-02-14

## 2024-02-16 ENCOUNTER — TELEPHONE (OUTPATIENT)
Dept: GASTROENTEROLOGY | Facility: CLINIC | Age: 69
End: 2024-02-16
Payer: MEDICARE

## 2024-02-16 DIAGNOSIS — I10 ESSENTIAL HYPERTENSION: ICD-10-CM

## 2024-02-16 RX ORDER — CARVEDILOL 3.12 MG/1
TABLET ORAL
Qty: 90 TABLET | Refills: 3 | Status: SHIPPED | OUTPATIENT
Start: 2024-02-16 | End: 2024-02-16 | Stop reason: SDUPTHER

## 2024-02-16 RX ORDER — CARVEDILOL 3.12 MG/1
3.12 TABLET ORAL 2 TIMES DAILY
Qty: 180 TABLET | Refills: 3 | Status: SHIPPED | OUTPATIENT
Start: 2024-02-16

## 2024-02-27 NOTE — PRE-PROCEDURE INSTRUCTIONS
"Instructed on date and arrival time of 0830. Come to entrance \"C\".  Must have  over age 18 to drive home.  May have two visitors; however, children under 12 must stay in waiting room.  Discussed diet/NPO.  May take medications as usual except for blood thinners, diabetic medications, or weight loss medications.  Bring list of medications to hospital.  Verbalized understanding of instructions given.  Instructed to call for questions or concerns.  Spoke with .  "

## 2024-03-05 ENCOUNTER — ANESTHESIA EVENT (OUTPATIENT)
Dept: GASTROENTEROLOGY | Facility: HOSPITAL | Age: 69
End: 2024-03-05
Payer: MEDICARE

## 2024-03-06 ENCOUNTER — ANESTHESIA (OUTPATIENT)
Dept: GASTROENTEROLOGY | Facility: HOSPITAL | Age: 69
End: 2024-03-06
Payer: MEDICARE

## 2024-03-06 ENCOUNTER — HOSPITAL ENCOUNTER (OUTPATIENT)
Facility: HOSPITAL | Age: 69
Setting detail: HOSPITAL OUTPATIENT SURGERY
Discharge: HOME | End: 2024-03-06
Attending: INTERNAL MEDICINE | Admitting: INTERNAL MEDICINE
Payer: MEDICARE

## 2024-03-06 VITALS
DIASTOLIC BLOOD PRESSURE: 74 MMHG | SYSTOLIC BLOOD PRESSURE: 114 MMHG | OXYGEN SATURATION: 95 % | HEART RATE: 73 BPM | RESPIRATION RATE: 18 BRPM | TEMPERATURE: 97.2 F | WEIGHT: 138.45 LBS | BODY MASS INDEX: 26.16 KG/M2

## 2024-03-06 DIAGNOSIS — K21.9 GASTROESOPHAGEAL REFLUX DISEASE WITHOUT ESOPHAGITIS: ICD-10-CM

## 2024-03-06 DIAGNOSIS — R12 HEARTBURN: ICD-10-CM

## 2024-03-06 DIAGNOSIS — R10.13 EPIGASTRIC PAIN: ICD-10-CM

## 2024-03-06 LAB — GLUCOSE BLDC GLUCOMTR-MCNC: 146 MG/DL (ref 70–99)

## 2024-03-06 PROCEDURE — 82948 REAGENT STRIP/BLOOD GLUCOSE: CPT

## 2024-03-06 PROCEDURE — 43239 EGD BIOPSY SINGLE/MULTIPLE: CPT | Performed by: INTERNAL MEDICINE

## 2024-03-06 PROCEDURE — 25010000002 ONDANSETRON PER 1 MG

## 2024-03-06 PROCEDURE — 25010000002 PROPOFOL 10 MG/ML EMULSION: Performed by: NURSE ANESTHETIST, CERTIFIED REGISTERED

## 2024-03-06 PROCEDURE — 88305 TISSUE EXAM BY PATHOLOGIST: CPT | Performed by: INTERNAL MEDICINE

## 2024-03-06 PROCEDURE — 25810000003 LACTATED RINGERS PER 1000 ML

## 2024-03-06 RX ORDER — ONDANSETRON 2 MG/ML
INJECTION INTRAMUSCULAR; INTRAVENOUS
Status: COMPLETED
Start: 2024-03-06 | End: 2024-03-06

## 2024-03-06 RX ORDER — LIDOCAINE HYDROCHLORIDE 20 MG/ML
INJECTION, SOLUTION EPIDURAL; INFILTRATION; INTRACAUDAL; PERINEURAL AS NEEDED
Status: DISCONTINUED | OUTPATIENT
Start: 2024-03-06 | End: 2024-03-06 | Stop reason: SURG

## 2024-03-06 RX ORDER — SODIUM CHLORIDE, SODIUM LACTATE, POTASSIUM CHLORIDE, CALCIUM CHLORIDE 600; 310; 30; 20 MG/100ML; MG/100ML; MG/100ML; MG/100ML
30 INJECTION, SOLUTION INTRAVENOUS CONTINUOUS
Status: DISCONTINUED | OUTPATIENT
Start: 2024-03-06 | End: 2024-03-06 | Stop reason: HOSPADM

## 2024-03-06 RX ORDER — PROPOFOL 10 MG/ML
VIAL (ML) INTRAVENOUS AS NEEDED
Status: DISCONTINUED | OUTPATIENT
Start: 2024-03-06 | End: 2024-03-06 | Stop reason: SURG

## 2024-03-06 RX ORDER — SODIUM CHLORIDE, SODIUM LACTATE, POTASSIUM CHLORIDE, CALCIUM CHLORIDE 600; 310; 30; 20 MG/100ML; MG/100ML; MG/100ML; MG/100ML
30 INJECTION, SOLUTION INTRAVENOUS CONTINUOUS
Status: DISCONTINUED | OUTPATIENT
Start: 2024-03-06 | End: 2024-03-06 | Stop reason: SDUPTHER

## 2024-03-06 RX ORDER — ONDANSETRON 2 MG/ML
4 INJECTION INTRAMUSCULAR; INTRAVENOUS ONCE
Status: DISCONTINUED | OUTPATIENT
Start: 2024-03-06 | End: 2024-03-06 | Stop reason: HOSPADM

## 2024-03-06 RX ADMIN — SODIUM CHLORIDE, POTASSIUM CHLORIDE, SODIUM LACTATE AND CALCIUM CHLORIDE 30 ML/HR: 600; 310; 30; 20 INJECTION, SOLUTION INTRAVENOUS at 09:38

## 2024-03-06 RX ADMIN — PROPOFOL 100 MG: 10 INJECTION, EMULSION INTRAVENOUS at 10:31

## 2024-03-06 RX ADMIN — PROPOFOL 50 MG: 10 INJECTION, EMULSION INTRAVENOUS at 10:35

## 2024-03-06 RX ADMIN — LIDOCAINE HYDROCHLORIDE 100 MG: 20 INJECTION, SOLUTION INTRAVENOUS at 10:31

## 2024-03-06 RX ADMIN — ONDANSETRON 4 MG: 2 INJECTION INTRAMUSCULAR; INTRAVENOUS at 09:40

## 2024-03-06 RX ADMIN — PROPOFOL 50 MG: 10 INJECTION, EMULSION INTRAVENOUS at 10:39

## 2024-03-06 NOTE — ANESTHESIA POSTPROCEDURE EVALUATION
Patient: Loretta Ren    Procedure Summary       Date: 03/06/24 Room / Location: [facility] ENDOSCOPY 4 / [facility] ENDOSCOPY    Anesthesia Start: 1029 Anesthesia Stop: 1047    Procedure: ESOPHAGOGASTRODUODENOSCOPY WITH BIOPSIES Diagnosis:       Epigastric pain      Heartburn      Gastroesophageal reflux disease without esophagitis      (Epigastric pain [R10.13])      (Heartburn [R12])      (Gastroesophageal reflux disease without esophagitis [K21.9])    Surgeons: Ava Rosen MD Provider: Brittany Sheppard CRNA    Anesthesia Type: general ASA Status: 3            Anesthesia Type: general    Vitals  Vitals Value Taken Time   BP     Temp     Pulse 91 03/06/24 1047   Resp     SpO2 91 % 03/06/24 1047   Vitals shown include unfiled device data.        Post Anesthesia Care and Evaluation    Patient location during evaluation: PHASE II  Patient participation: complete - patient participated  Level of consciousness: awake  Pain management: adequate    Airway patency: patent  PONV Status: none  Cardiovascular status: acceptable and stable  Respiratory status: acceptable  Hydration status: acceptable    Comments: An Anesthesiologist personally participated in the most demanding procedures (including induction and emergence if applicable) in the anesthesia plan, monitored the course of anesthesia administration at frequent intervals and remained physically present and available for immediate diagnosis and treatment of emergencies.

## 2024-03-06 NOTE — ANESTHESIA PREPROCEDURE EVALUATION
Anesthesia Evaluation     Patient summary reviewed and Nursing notes reviewed   history of anesthetic complications:  PONV  NPO Solid Status: > 8 hours  NPO Liquid Status: > 8 hours           Airway   Mallampati: I  TM distance: >3 FB  Neck ROM: full  No difficulty expected  Dental    (+) partials    Comment: Lower partials removed prior to procedure     Pulmonary - normal exam    breath sounds clear to auscultation  Cardiovascular - normal exam  Exercise tolerance: good (4-7 METS)    ECG reviewed  Rhythm: regular  Rate: normal    (+) valvular problems/murmurs murmur, hyperlipidemia      Neuro/Psych  (+) dizziness/light headedness, psychiatric history Anxiety  GI/Hepatic/Renal/Endo    (+) GERD poorly controlled, PUD, renal disease- stones, diabetes mellitus type 2 well controlled    Musculoskeletal     Abdominal    Substance History      OB/GYN          Other        ROS/Med Hx Other: EKG 08/10/22HR 81, SR    ECHO 08/01/23:   Left ventricular systolic function is normal. Calculated left ventricular EF = 55.9%  ·Left ventricular diastolic function was normal.  ·Estimated right ventricular systolic pressure from tricuspid regurgitation is normal (<35 mmHg).                      Anesthesia Plan    ASA 3     general   total IV anesthesia  (Total IV Anesthesia    Patient understands anesthesia not responsible for dental damage.  )  intravenous induction     Anesthetic plan, risks, benefits, and alternatives have been provided, discussed and informed consent has been obtained with: patient and spouse/significant other.  Pre-procedure education provided  Plan discussed with CRNA.    CODE STATUS:

## 2024-03-06 NOTE — H&P
Pre Procedure History & Physical    Chief Complaint:   Heartburn, epigastric pain    Subjective     HPI:   69 yo F here for eval of heartburn, epigastric pain.    Past Medical History:   Past Medical History:   Diagnosis Date    Abnormal CT of the chest 04/29/2019    Allergic rhinitis     Allergic rhinitis due to allergen 11/06/2018    Anxiety 11/06/2017    IMPROVED SINCE STARTING TRINTELLIX    Anxiety disorder 11/06/2018    Diabetes mellitus 11/06/2018    DIET CONTROLLED NO MEDS    Dizziness 11/06/2017    THE PATIENT REPORTS A TRANSIENT BOUT OF DIZZINESS THAT SHE ATTRIBUTES TO STRESS. HER LAST BOUT OF DIZZINESS WAS IN JULY 2017.  I DID REVIEW HER CDS WHICH WERE UNREVEALING. I WILL REQUEST RECORES TO BE SENT TO REVIEW    DM2 (diabetes mellitus, type 2)     Duodenal ulcer     Facial pain 11/06/2017    THE PATIENT REPORTS A TRANSIENT BOUT OF FACIAL PAIN WITH RADIATION TOEARDS THE RIGHT EAR. SHE NOTES THAT THIS HAS RESOLVED. 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    Generalized anxiety disorder 04/29/2019    GERD (gastroesophageal reflux disease) 11/06/2018    Heart murmur     CONTROLLED    Hemorrhoids     Hyperlipidemia 11/06/2018    Mitral valve disorder     Osteopenia 04/29/2019    PONV (postoperative nausea and vomiting)     PVC (premature ventricular contraction) 08/04/2021    Renal calculus or stone     Sarcoidosis     Sinus trouble     Type 2 diabetes mellitus with hyperglycemia, without long-term current use of insulin 04/29/2019       Past Surgical History:  Past Surgical History:   Procedure Laterality Date    CHOLECYSTECTOMY      COLONOSCOPY  2008    cologard 2018    CYSTOSCOPY      CYSTOSCOPY URETEROSCOPY Right 03/02/2022    Procedure: CYSTOSCOPY, RIGHT URETEROSCOPY, STONE BASKET EXTRACTION AND RIGHT URETERAL STENT INSERTION;  Surgeon: Deacon Rosales MD;  Location: [facility] OR;  Service: Urology;  Laterality: Right;    CYSTOSCOPY W/ URETERAL  STENT PLACEMENT Right 02/18/2022    Procedure: Cystoscopy  with right ureteral stent placement;  Surgeon: Deacon Rosales MD;  Location: [facility] MAIN OR;  Service: Urology;  Laterality: Right;    ENDOSCOPY      LUNG BIOPSY  1992    SARCOIDOSIS, NO CANCER    UPPER GASTROINTESTINAL ENDOSCOPY      2011 at [facility]    URETEROSCOPY LASER LITHOTRIPSY WITH STENT INSERTION         Family History:  Family History   Problem Relation Age of Onset    Kidney cancer Father 78        bladder    Breast cancer Mother 80    Heart disease Mother     Heart disease Daughter     Malig Hyperthermia Neg Hx     Ovarian cancer Neg Hx     Uterine cancer Neg Hx     Prostate cancer Neg Hx     Colon cancer Neg Hx        Social History:   reports that she has never smoked. She has never used smokeless tobacco. She reports that she does not drink alcohol and does not use drugs.    Medications:   Medications Prior to Admission   Medication Sig Dispense Refill Last Dose    carvedilol (COREG) 3.125 MG tablet Take 1 tablet by mouth 2 (Two) Times a Day. 180 tablet 3 3/5/2024    citalopram (CeleXA) 10 MG tablet Take 1 tablet by mouth Daily. 90 tablet 1 3/5/2024    famotidine (Pepcid) 40 MG tablet Take 1 tablet by mouth every night at bedtime. 90 tablet 1 3/5/2024    fluticasone (FLONASE) 50 MCG/ACT nasal spray 2 sprays into the nostril(s) as directed by provider Daily As Needed for Allergies or Rhinitis. 16 g 5 Past Week    loratadine (CLARITIN) 10 MG tablet Take 1 tablet by mouth Daily As Needed.   Past Week    multivitamin with minerals tablet tablet Take 1 tablet by mouth Daily.   Past Month    pantoprazole (Protonix) 40 MG EC tablet Take 1 tablet by mouth Daily. 90 tablet 1 3/5/2024    EPINEPHrine (EPIPEN) 0.3 MG/0.3ML solution auto-injector injection USE AS DIRECTED FOR acute allergic reaction          Allergies:  Aspirin and Ceftriaxone    ROS:    Pertinent items are noted in HPI     Objective     Blood pressure 127/75, pulse 69, temperature 97.3 °F  (36.3 °C), temperature source Temporal, resp. rate 16, weight 62.8 kg (138 lb 7.2 oz), SpO2 97%, not currently breastfeeding.    Physical Exam   Constitutional: Pt is oriented to person, place, and time and well-developed, well-nourished, and in no distress.   Mouth/Throat: Oropharynx is clear and moist.   Neck: Normal range of motion.   Cardiovascular: Normal rate, regular rhythm and normal heart sounds.    Pulmonary/Chest: Effort normal and breath sounds normal.   Abdominal: Soft. Nontender  Skin: Skin is warm and dry.   Psychiatric: Mood, memory, affect and judgment normal.     Assessment & Plan     Diagnosis:  Heartburn, epigastric pain    Anticipated Surgical Procedure:  EGD    The risks, benefits, and alternatives of this procedure have been discussed with the patient or the responsible party- the patient understands and agrees to proceed.

## 2024-03-07 LAB
CYTO UR: NORMAL
LAB AP CASE REPORT: NORMAL
LAB AP CLINICAL INFORMATION: NORMAL
PATH REPORT.FINAL DX SPEC: NORMAL
PATH REPORT.GROSS SPEC: NORMAL

## 2024-03-11 ENCOUNTER — TELEPHONE (OUTPATIENT)
Dept: GASTROENTEROLOGY | Facility: CLINIC | Age: 69
End: 2024-03-11
Payer: MEDICARE

## 2024-03-11 NOTE — TELEPHONE ENCOUNTER
----- Message from Elizabeth Almendarez RN sent at 3/11/2024  8:33 AM EDT -----    ----- Message -----  From: Ava Rosen MD  Sent: 3/7/2024   8:02 PM EDT  To: Elizabeth Almendarez RN    Benign gastric polyps.  No H.pylori infection.    Please arrange f/u appointment.

## 2024-03-13 ENCOUNTER — OFFICE VISIT (OUTPATIENT)
Dept: FAMILY MEDICINE CLINIC | Facility: CLINIC | Age: 69
End: 2024-03-13
Payer: MEDICARE

## 2024-03-13 VITALS
BODY MASS INDEX: 26.06 KG/M2 | TEMPERATURE: 97.9 F | DIASTOLIC BLOOD PRESSURE: 74 MMHG | SYSTOLIC BLOOD PRESSURE: 102 MMHG | HEART RATE: 76 BPM | HEIGHT: 61 IN | RESPIRATION RATE: 18 BRPM | WEIGHT: 138 LBS | OXYGEN SATURATION: 97 %

## 2024-03-13 DIAGNOSIS — R07.89 CHEST DISCOMFORT: Primary | ICD-10-CM

## 2024-03-13 DIAGNOSIS — R42 DIZZINESS: ICD-10-CM

## 2024-03-13 DIAGNOSIS — R11.0 NAUSEA: ICD-10-CM

## 2024-03-13 PROCEDURE — 99213 OFFICE O/P EST LOW 20 MIN: CPT | Performed by: NURSE PRACTITIONER

## 2024-03-13 PROCEDURE — 1159F MED LIST DOCD IN RCRD: CPT | Performed by: NURSE PRACTITIONER

## 2024-03-13 PROCEDURE — 1160F RVW MEDS BY RX/DR IN RCRD: CPT | Performed by: NURSE PRACTITIONER

## 2024-03-13 NOTE — PROGRESS NOTES
Chief Complaint  Sore Throat (States she had egd last Wednesday, started with sore throat yesterday), Headache (Above left eye), Chest Pain (States her chest feels sore ), and Dizziness    Subjective          Loretta Ren presents to [facility]  History of Present Illness  She is actually here for more sore throat chest discomfort chest pain irritability headache.  She said she had an EGD last Wednesday and since then she has had increased pain in the chest.  She said it is soreness in the chest and in the abdomen.  She said she went to the allergist and they said there was no respiratory issues going on.  She refused a COVID and flu test today.  She has not had a fever.  She has not lost taste.  She does get the dizziness and nausea in the process.  She said this all started just after the scope.  She said she was told that she should not take the Protonix anymore as it could be causing the gastritis.  She wants to know if she can take Gaviscon.                   Allergies  Aspirin and Ceftriaxone    Social History     Tobacco Use    Smoking status: Never    Smokeless tobacco: Never   Vaping Use    Vaping status: Never Used   Substance Use Topics    Alcohol use: Never    Drug use: Never       Family History   Problem Relation Age of Onset    Kidney cancer Father 78        bladder    Breast cancer Mother 80    Heart disease Mother     Heart disease Daughter     Malig Hyperthermia Neg Hx     Ovarian cancer Neg Hx     Uterine cancer Neg Hx     Prostate cancer Neg Hx     Colon cancer Neg Hx         Health Maintenance Due   Topic Date Due    DIABETIC FOOT EXAM  Never done    DIABETIC EYE EXAM  03/31/2023        Immunization History   Administered Date(s) Administered    Flu Vaccine Quad PF >36MO 10/06/2015, 12/01/2016, 10/03/2017, 10/10/2019, 10/06/2020    Fluzone High-Dose 65+yrs 09/30/2021, 10/17/2022, 10/02/2023    Fluzone Quad >6mos (Multi-dose) 10/19/2018    Influenza TIV (IM)  "10/31/2014    Influenza, Unspecified 10/06/2020    PEDS-Pneumococcal Conjugate (PCV7) 10/31/2014    Pneumococcal Conjugate Unspecified 10/31/2014    Pneumococcal Polysaccharide (PPSV23) 10/31/2014    Shingrix 12/11/2014    Zostavax 12/11/2014       Review of Systems   Constitutional:  Negative for fatigue.   Respiratory:  Negative for cough and shortness of breath.    Cardiovascular:  Positive for chest pain.   Gastrointestinal:  Positive for nausea. Negative for diarrhea and vomiting.   Neurological:  Positive for dizziness and headache.        Objective       Vitals:    03/13/24 1329   BP: 102/74   Pulse: 76   Resp: 18   Temp: 97.9 °F (36.6 °C)   SpO2: 97%   Weight: 62.6 kg (138 lb)   Height: 154.9 cm (61\")       Body mass index is 26.07 kg/m².         Physical Exam  Vitals reviewed.   Constitutional:       Appearance: Normal appearance. She is well-developed.   Cardiovascular:      Rate and Rhythm: Normal rate and regular rhythm.      Heart sounds: Normal heart sounds. No murmur heard.  Pulmonary:      Effort: Pulmonary effort is normal.      Breath sounds: Normal breath sounds.   Neurological:      Mental Status: She is alert and oriented to person, place, and time.      Cranial Nerves: No cranial nerve deficit.      Motor: No weakness.   Psychiatric:         Mood and Affect: Mood and affect normal.             Result Review :     The following data was reviewed by: KIAN Lyons on 03/13/2024:                     Assessment and Plan      Diagnoses and all orders for this visit:    1. Chest discomfort (Primary)  -     Treadmill Stress Test; Future    2. Dizziness  -     Treadmill Stress Test; Future    3. Nausea  -     Treadmill Stress Test; Future            Follow Up     No follow-ups on file.  I discussed that she is 1 week out from her stroke but I do not feel that any of this could be related to the scope itself.  I did reach out to GI and GYN he did say that she could take Gaviscon but " actually the Protonix would be better protection with the gastritis.  I will do a workup cardiovascular wise due to the chest discomfort and nausea along with dizziness and headache.  Patient is aware to go to the emergency room if signs and symptoms worsen.  Patient was given instructions and counseling regarding her condition or for health maintenance advice. Please see specific information pulled into the AVS if appropriate.     Parts of this note are electronic transcriptions/translations of spoken language to printed text using the Dragon Dictation system.          Natalie Mukherjee, APRN  03/13/2024

## 2024-03-15 ENCOUNTER — PATIENT ROUNDING (BHMG ONLY) (OUTPATIENT)
Dept: FAMILY MEDICINE CLINIC | Facility: CLINIC | Age: 69
End: 2024-03-15
Payer: MEDICARE

## 2024-04-08 ENCOUNTER — HOSPITAL ENCOUNTER (OUTPATIENT)
Dept: CARDIOLOGY | Facility: HOSPITAL | Age: 69
Discharge: HOME | End: 2024-04-08
Admitting: NURSE PRACTITIONER
Payer: MEDICARE

## 2024-04-08 DIAGNOSIS — R07.89 CHEST DISCOMFORT: ICD-10-CM

## 2024-04-08 DIAGNOSIS — R11.0 NAUSEA: ICD-10-CM

## 2024-04-08 DIAGNOSIS — R42 DIZZINESS: ICD-10-CM

## 2024-04-08 PROCEDURE — 93016 CV STRESS TEST SUPVJ ONLY: CPT | Performed by: NURSE PRACTITIONER

## 2024-04-08 PROCEDURE — 93018 CV STRESS TEST I&R ONLY: CPT | Performed by: INTERNAL MEDICINE

## 2024-04-08 PROCEDURE — 93017 CV STRESS TEST TRACING ONLY: CPT

## 2024-04-09 LAB
BH CV IMMEDIATE POST RECOVERY TECH DATA SYMPTOMS: NORMAL
BH CV IMMEDIATE POST TECH DATA BLOOD PRESSURE: NORMAL MMHG
BH CV IMMEDIATE POST TECH DATA HEART RATE: 135 BPM
BH CV IMMEDIATE POST TECH DATA OXYGEN SATS: 96 %
BH CV SIX MINUTE RECOVERY TECH DATA BLOOD PRESSURE: NORMAL
BH CV SIX MINUTE RECOVERY TECH DATA HEART RATE: 86 BPM
BH CV SIX MINUTE RECOVERY TECH DATA OXYGEN SATURATION: 98 %
BH CV SIX MINUTE RECOVERY TECH DATA SYMPTOMS: NORMAL
BH CV STRESS BP STAGE 1: NORMAL
BH CV STRESS BP STAGE 2: NORMAL
BH CV STRESS DURATION MIN STAGE 1: 3
BH CV STRESS DURATION MIN STAGE 2: 3
BH CV STRESS DURATION SEC STAGE 1: 0
BH CV STRESS DURATION SEC STAGE 2: 0
BH CV STRESS GRADE STAGE 1: 0
BH CV STRESS GRADE STAGE 2: 0
BH CV STRESS HR STAGE 1: 140
BH CV STRESS HR STAGE 2: 135
BH CV STRESS METS STAGE 1: 2.3
BH CV STRESS METS STAGE 2: 3.5
BH CV STRESS PROTOCOL 1: NORMAL
BH CV STRESS RECOVERY BP: NORMAL MMHG
BH CV STRESS RECOVERY HR: 86 BPM
BH CV STRESS RECOVERY O2: 98 %
BH CV STRESS SPEED STAGE 1: 1.7
BH CV STRESS SPEED STAGE 2: 1.7
BH CV STRESS STAGE 1: 1
BH CV STRESS STAGE 2: 2
BH CV THREE MINUTE POST TECH DATA BLOOD PRESSURE: NORMAL MMHG
BH CV THREE MINUTE POST TECH DATA HEART RATE: 91 BPM
BH CV THREE MINUTE POST TECH DATA OXYGEN SATURATION: 98 %
BH CV THREE MINUTE RECOVERY TECH DATA SYMPTOM: NORMAL
MAXIMAL PREDICTED HEART RATE: 150 BPM
PERCENT MAX PREDICTED HR: 95.33 %
STRESS BASELINE BP: NORMAL MMHG
STRESS BASELINE HR: 74 BPM
STRESS O2 SAT REST: 96 %
STRESS PERCENT HR: 112 %
STRESS POST ESTIMATED WORKLOAD: 2.3 METS
STRESS POST EXERCISE DUR MIN: 6 MIN
STRESS POST EXERCISE DUR SEC: 1 SEC
STRESS POST O2 SAT PEAK: 98 %
STRESS POST PEAK BP: NORMAL MMHG
STRESS POST PEAK HR: 143 BPM
STRESS TARGET HR: 128 BPM

## 2024-04-16 ENCOUNTER — LAB (OUTPATIENT)
Dept: FAMILY MEDICINE CLINIC | Facility: CLINIC | Age: 69
End: 2024-04-16
Payer: MEDICARE

## 2024-04-16 DIAGNOSIS — I49.3 PVC (PREMATURE VENTRICULAR CONTRACTION): ICD-10-CM

## 2024-04-16 DIAGNOSIS — E78.2 MIXED HYPERLIPIDEMIA: ICD-10-CM

## 2024-04-16 DIAGNOSIS — F41.1 GENERALIZED ANXIETY DISORDER: ICD-10-CM

## 2024-04-16 DIAGNOSIS — E11.9 TYPE 2 DIABETES MELLITUS WITHOUT COMPLICATION, WITHOUT LONG-TERM CURRENT USE OF INSULIN: ICD-10-CM

## 2024-04-16 LAB
ALBUMIN SERPL-MCNC: 4.5 G/DL (ref 3.5–5.2)
ALBUMIN UR-MCNC: <1.2 MG/DL
ALBUMIN/GLOB SERPL: 1.6 G/DL
ALP SERPL-CCNC: 84 U/L (ref 39–117)
ALT SERPL W P-5'-P-CCNC: 27 U/L (ref 1–33)
ANION GAP SERPL CALCULATED.3IONS-SCNC: 10.6 MMOL/L (ref 5–15)
AST SERPL-CCNC: 27 U/L (ref 1–32)
BASOPHILS # BLD AUTO: 0.02 10*3/MM3 (ref 0–0.2)
BASOPHILS NFR BLD AUTO: 0.3 % (ref 0–1.5)
BILIRUB SERPL-MCNC: 0.7 MG/DL (ref 0–1.2)
BUN SERPL-MCNC: 11 MG/DL (ref 8–23)
BUN/CREAT SERPL: 12.9 (ref 7–25)
CALCIUM SPEC-SCNC: 9.6 MG/DL (ref 8.6–10.5)
CHLORIDE SERPL-SCNC: 101 MMOL/L (ref 98–107)
CHOLEST SERPL-MCNC: 208 MG/DL (ref 0–200)
CO2 SERPL-SCNC: 24.4 MMOL/L (ref 22–29)
CREAT SERPL-MCNC: 0.85 MG/DL (ref 0.57–1)
DEPRECATED RDW RBC AUTO: 41.2 FL (ref 37–54)
EGFRCR SERPLBLD CKD-EPI 2021: 73.4 ML/MIN/1.73
EOSINOPHIL # BLD AUTO: 0.1 10*3/MM3 (ref 0–0.4)
EOSINOPHIL NFR BLD AUTO: 1.7 % (ref 0.3–6.2)
ERYTHROCYTE [DISTWIDTH] IN BLOOD BY AUTOMATED COUNT: 12.8 % (ref 12.3–15.4)
GLOBULIN UR ELPH-MCNC: 2.8 GM/DL
GLUCOSE SERPL-MCNC: 131 MG/DL (ref 65–99)
HBA1C MFR BLD: 7.5 % (ref 4.8–5.6)
HCT VFR BLD AUTO: 44.1 % (ref 34–46.6)
HDLC SERPL-MCNC: 46 MG/DL (ref 40–60)
HGB BLD-MCNC: 15.1 G/DL (ref 12–15.9)
IMM GRANULOCYTES # BLD AUTO: 0.02 10*3/MM3 (ref 0–0.05)
IMM GRANULOCYTES NFR BLD AUTO: 0.3 % (ref 0–0.5)
LDLC SERPL CALC-MCNC: 135 MG/DL (ref 0–100)
LDLC/HDLC SERPL: 2.86 {RATIO}
LYMPHOCYTES # BLD AUTO: 1.78 10*3/MM3 (ref 0.7–3.1)
LYMPHOCYTES NFR BLD AUTO: 30.5 % (ref 19.6–45.3)
MCH RBC QN AUTO: 30.6 PG (ref 26.6–33)
MCHC RBC AUTO-ENTMCNC: 34.2 G/DL (ref 31.5–35.7)
MCV RBC AUTO: 89.3 FL (ref 79–97)
MONOCYTES # BLD AUTO: 0.46 10*3/MM3 (ref 0.1–0.9)
MONOCYTES NFR BLD AUTO: 7.9 % (ref 5–12)
NEUTROPHILS NFR BLD AUTO: 3.45 10*3/MM3 (ref 1.7–7)
NEUTROPHILS NFR BLD AUTO: 59.3 % (ref 42.7–76)
NRBC BLD AUTO-RTO: 0 /100 WBC (ref 0–0.2)
PLATELET # BLD AUTO: 205 10*3/MM3 (ref 140–450)
PMV BLD AUTO: 11.2 FL (ref 6–12)
POTASSIUM SERPL-SCNC: 4.6 MMOL/L (ref 3.5–5.2)
PROT SERPL-MCNC: 7.3 G/DL (ref 6–8.5)
RBC # BLD AUTO: 4.94 10*6/MM3 (ref 3.77–5.28)
SODIUM SERPL-SCNC: 136 MMOL/L (ref 136–145)
TRIGL SERPL-MCNC: 153 MG/DL (ref 0–150)
TSH SERPL DL<=0.05 MIU/L-ACNC: 1.78 UIU/ML (ref 0.27–4.2)
VLDLC SERPL-MCNC: 27 MG/DL (ref 5–40)
WBC NRBC COR # BLD AUTO: 5.83 10*3/MM3 (ref 3.4–10.8)

## 2024-04-16 PROCEDURE — 80053 COMPREHEN METABOLIC PANEL: CPT | Performed by: NURSE PRACTITIONER

## 2024-04-16 PROCEDURE — 84443 ASSAY THYROID STIM HORMONE: CPT | Performed by: NURSE PRACTITIONER

## 2024-04-16 PROCEDURE — 82043 UR ALBUMIN QUANTITATIVE: CPT | Performed by: NURSE PRACTITIONER

## 2024-04-16 PROCEDURE — 83036 HEMOGLOBIN GLYCOSYLATED A1C: CPT | Performed by: NURSE PRACTITIONER

## 2024-04-16 PROCEDURE — 36415 COLL VENOUS BLD VENIPUNCTURE: CPT | Performed by: NURSE PRACTITIONER

## 2024-04-16 PROCEDURE — 85025 COMPLETE CBC W/AUTO DIFF WBC: CPT | Performed by: NURSE PRACTITIONER

## 2024-04-16 PROCEDURE — 80061 LIPID PANEL: CPT | Performed by: NURSE PRACTITIONER

## 2024-04-16 RX ORDER — CITALOPRAM HYDROBROMIDE 10 MG/1
10 TABLET ORAL DAILY
Qty: 90 TABLET | Refills: 1 | OUTPATIENT
Start: 2024-04-16

## 2024-04-18 ENCOUNTER — OFFICE VISIT (OUTPATIENT)
Dept: CARDIOLOGY | Facility: CLINIC | Age: 69
End: 2024-04-18
Payer: MEDICARE

## 2024-04-18 VITALS
SYSTOLIC BLOOD PRESSURE: 116 MMHG | BODY MASS INDEX: 25.86 KG/M2 | DIASTOLIC BLOOD PRESSURE: 68 MMHG | HEART RATE: 83 BPM | HEIGHT: 61 IN | WEIGHT: 137 LBS

## 2024-04-18 DIAGNOSIS — I49.3 PVC (PREMATURE VENTRICULAR CONTRACTION): Primary | ICD-10-CM

## 2024-04-18 DIAGNOSIS — R94.31 ABNORMAL ELECTROCARDIOGRAM (ECG) (EKG): ICD-10-CM

## 2024-04-18 DIAGNOSIS — R42 DIZZINESS: ICD-10-CM

## 2024-04-18 PROBLEM — R12 HEARTBURN: Status: RESOLVED | Noted: 2024-02-14 | Resolved: 2024-04-18

## 2024-04-18 PROBLEM — R10.13 EPIGASTRIC PAIN: Status: RESOLVED | Noted: 2024-02-14 | Resolved: 2024-04-18

## 2024-04-18 PROBLEM — E78.5 HYPERLIPIDEMIA: Status: RESOLVED | Noted: 2018-11-06 | Resolved: 2024-04-18

## 2024-04-18 PROCEDURE — 1160F RVW MEDS BY RX/DR IN RCRD: CPT | Performed by: NURSE PRACTITIONER

## 2024-04-18 PROCEDURE — 1159F MED LIST DOCD IN RCRD: CPT | Performed by: NURSE PRACTITIONER

## 2024-04-18 PROCEDURE — 99214 OFFICE O/P EST MOD 30 MIN: CPT | Performed by: NURSE PRACTITIONER

## 2024-04-18 NOTE — PROGRESS NOTES
Chief Complaint  Follow-up (ABN STRESS TEST ) and Dizziness    Subjective            History of Present Illness  Loretta Ren is a 69-year-old female patient who presents to the office today for follow-up.  She has PVCs for which she is prescribed carvedilol 3.125 mg twice daily.  She has been experiencing some chest pressure so PCP ordered a treadmill stress test which showed mild ST depression.  She also reports increased amount of dizziness over the last few months which causes her to have to stop what she is doing to rest in order for the symptom to subside. She denies loss of consciousness. She is compliant with medication.  She denies any shortness of breath, palpitations, or edema.    PMH  Past Medical History:   Diagnosis Date    Allergic rhinitis     Anxiety 11/06/2017    IMPROVED SINCE STARTING TRINTELLIX    Duodenal ulcer     Facial pain 11/06/2017    THE PATIENT REPORTS A TRANSIENT BOUT OF FACIAL PAIN WITH RADIATION TOEARDS THE RIGHT EAR. SHE NOTES THAT THIS HAS RESOLVED. 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    GERD (gastroesophageal reflux disease) 11/06/2018    Hemorrhoids     Hyperlipidemia 11/06/2018    Osteopenia 04/29/2019    PONV (postoperative nausea and vomiting)     PVC (premature ventricular contraction) 08/04/2021    Renal calculus or stone     Sarcoidosis     Sinus trouble     Type 2 diabetes mellitus with hyperglycemia, without long-term current use of insulin 04/29/2019         ALLERGY  Allergies   Allergen Reactions    Aspirin Swelling    Ceftriaxone Rash          SURGICALHX  Past Surgical History:   Procedure Laterality Date    CHOLECYSTECTOMY      COLONOSCOPY  2008    cologard 2018    CYSTOSCOPY      CYSTOSCOPY URETEROSCOPY Right 03/02/2022    Procedure: CYSTOSCOPY, RIGHT URETEROSCOPY, STONE BASKET EXTRACTION AND RIGHT URETERAL STENT INSERTION;  Surgeon: Deacon Rosales MD;  Location: [facility] MAIN OR;  Service: Urology;   Laterality: Right;    CYSTOSCOPY W/ URETERAL STENT PLACEMENT Right 02/18/2022    Procedure: Cystoscopy  with right ureteral stent placement;  Surgeon: Deacon Rosales MD;  Location: [facility] MAIN OR;  Service: Urology;  Laterality: Right;    ENDOSCOPY      ENDOSCOPY N/A 3/6/2024    Procedure: ESOPHAGOGASTRODUODENOSCOPY WITH BIOPSIES;  Surgeon: Ava Rosen MD;  Location: [facility] ENDOSCOPY;  Service: Gastroenterology;  Laterality: N/A;  GASTRIC POLYPS, GASTRITIS    LUNG BIOPSY  1992    SARCOIDOSIS, NO CANCER    UPPER GASTROINTESTINAL ENDOSCOPY      2011 at [facility]    URETEROSCOPY LASER LITHOTRIPSY WITH STENT INSERTION            SOC  Social History     Socioeconomic History    Marital status:    Tobacco Use    Smoking status: Never    Smokeless tobacco: Never   Vaping Use    Vaping status: Never Used   Substance and Sexual Activity    Alcohol use: Never    Drug use: Never    Sexual activity: Yes     Partners: Male     Birth control/protection: Post-menopausal         FAMHX  Family History   Problem Relation Age of Onset    Kidney cancer Father 78        bladder    Breast cancer Mother 80    Heart disease Mother     Heart disease Daughter     Malig Hyperthermia Neg Hx     Ovarian cancer Neg Hx     Uterine cancer Neg Hx     Prostate cancer Neg Hx     Colon cancer Neg Hx           MEDSIGONLY  Current Outpatient Medications on File Prior to Visit   Medication Sig    carvedilol (COREG) 3.125 MG tablet Take 1 tablet by mouth 2 (Two) Times a Day.    citalopram (CeleXA) 10 MG tablet Take 1 tablet by mouth Daily.    EPINEPHrine (EPIPEN) 0.3 MG/0.3ML solution auto-injector injection USE AS DIRECTED FOR acute allergic reaction    famotidine (Pepcid) 40 MG tablet Take 1 tablet by mouth every night at bedtime.    fluticasone (FLONASE) 50 MCG/ACT nasal spray 2 sprays into the nostril(s) as directed by provider Daily As Needed for Allergies or Rhinitis.    loratadine (CLARITIN) 10 MG tablet Take 1 tablet by mouth  "Daily As Needed.    multivitamin with minerals tablet tablet Take 1 tablet by mouth Daily.    pantoprazole (Protonix) 40 MG EC tablet Take 1 tablet by mouth Daily.     No current facility-administered medications on file prior to visit.         Objective   /68   Pulse 83   Ht 154.9 cm (61\")   Wt 62.1 kg (137 lb)   BMI 25.89 kg/m²       Physical Exam  HENT:      Head: Normocephalic.   Neck:      Vascular: No carotid bruit.   Cardiovascular:      Rate and Rhythm: Normal rate and regular rhythm.      Pulses: Normal pulses.      Heart sounds: Normal heart sounds. No murmur heard.  Pulmonary:      Effort: Pulmonary effort is normal.      Breath sounds: Normal breath sounds.   Musculoskeletal:      Cervical back: Neck supple.      Right lower leg: No edema.      Left lower leg: No edema.   Skin:     General: Skin is dry.   Neurological:      Mental Status: She is alert and oriented to person, place, and time.   Psychiatric:         Behavior: Behavior normal.         Result Review :   The following data was reviewed by: KIAN Levy on 04/18/2024:  No results found for: \"PROBNP\"        4/16/2024    08:27   CMP   Glucose 131    BUN 11    Creatinine 0.85    EGFR 73.4    Sodium 136    Potassium 4.6    Chloride 101    Calcium 9.6    Total Protein 7.3    Albumin 4.5    Globulin 2.8    Total Bilirubin 0.7    Alkaline Phosphatase 84    AST (SGOT) 27    ALT (SGPT) 27    Albumin/Globulin Ratio 1.6    BUN/Creatinine Ratio 12.9    Anion Gap 10.6      CBC w/diff          4/16/2024    08:27   CBC w/Diff   WBC 5.83    RBC 4.94    Hemoglobin 15.1    Hematocrit 44.1    MCV 89.3    MCH 30.6    MCHC 34.2    RDW 12.8    Platelets 205    Neutrophil Rel % 59.3    Immature Granulocyte Rel % 0.3    Lymphocyte Rel % 30.5    Monocyte Rel % 7.9    Eosinophil Rel % 1.7    Basophil Rel % 0.3       Lab Results   Component Value Date    TSH 1.780 04/16/2024      Lab Results   Component Value Date    FREET4 1.31 09/27/2021      No " "results found for: \"DDIMERQUANT\"  Magnesium   Date Value Ref Range Status   01/18/2019 2.13 1.60 - 2.30 mg/dL Final      No results found for: \"DIGOXIN\"   No results found for: \"TROPONINT\"       Lab 04/16/24  0827   HEMOGLOBIN A1C 7.50*        Results for orders placed in visit on 08/01/23    Adult Transthoracic Echo Complete W/ Cont if Necessary Per Protocol    Interpretation Summary    Left ventricular systolic function is normal. Calculated left ventricular EF = 55.9%    Left ventricular diastolic function was normal.    Estimated right ventricular systolic pressure from tricuspid regurgitation is normal (<35 mmHg).           Assessment and Plan    Diagnoses and all orders for this visit:    1. PVC (premature ventricular contraction) & 2. Abnormal EKG  She had an abnormal treadmill stress test and continues to have chest pressure, obtain nuclear stress test to rule out ischemia.  Obtain 7-day Holter monitor to assess for possible increased frequency of PVCs versus arrhythmia that could be causing her dizziness/chest pressure.  -     Holter Monitor - 72 Hour Up To 15 Days; Future        -     Stress Test With Myocardial Perfusion One Day; Future    3. Dizziness  Obtain carotid ultrasound to assess for possible stenosis causing dizziness.  -     Duplex Carotid Ultrasound CAR; Future          Follow Up   Return in about 6 months (around 10/18/2024) for Follow up with Dr Saucedo.    Patient was given instructions and counseling regarding her condition or for health maintenance advice. Please see specific information pulled into the AVS if appropriate.     Loretta Ren  reports that she has never smoked. She has never used smokeless tobacco.            Pinky Thompson, KIAN  04/18/24  08:59 EDT    Dictated Utilizing Dragon Dictation    "

## 2024-04-24 ENCOUNTER — OFFICE VISIT (OUTPATIENT)
Dept: FAMILY MEDICINE CLINIC | Facility: CLINIC | Age: 69
End: 2024-04-24
Payer: MEDICARE

## 2024-04-24 VITALS
BODY MASS INDEX: 26.06 KG/M2 | HEART RATE: 78 BPM | TEMPERATURE: 97.3 F | WEIGHT: 138 LBS | HEIGHT: 61 IN | DIASTOLIC BLOOD PRESSURE: 61 MMHG | SYSTOLIC BLOOD PRESSURE: 123 MMHG | OXYGEN SATURATION: 98 % | RESPIRATION RATE: 16 BRPM

## 2024-04-24 DIAGNOSIS — K21.9 GASTROESOPHAGEAL REFLUX DISEASE WITHOUT ESOPHAGITIS: ICD-10-CM

## 2024-04-24 DIAGNOSIS — Z78.0 POSTMENOPAUSE: ICD-10-CM

## 2024-04-24 DIAGNOSIS — J02.9 SORE THROAT: ICD-10-CM

## 2024-04-24 DIAGNOSIS — Z12.31 SCREENING MAMMOGRAM FOR BREAST CANCER: ICD-10-CM

## 2024-04-24 DIAGNOSIS — E11.9 TYPE 2 DIABETES MELLITUS WITHOUT COMPLICATION, WITHOUT LONG-TERM CURRENT USE OF INSULIN: ICD-10-CM

## 2024-04-24 DIAGNOSIS — F41.1 GENERALIZED ANXIETY DISORDER: ICD-10-CM

## 2024-04-24 DIAGNOSIS — Z00.00 MEDICARE ANNUAL WELLNESS VISIT, SUBSEQUENT: Primary | ICD-10-CM

## 2024-04-24 DIAGNOSIS — J30.1 SEASONAL ALLERGIC RHINITIS DUE TO POLLEN: ICD-10-CM

## 2024-04-24 PROCEDURE — 87070 CULTURE OTHR SPECIMN AEROBIC: CPT | Performed by: NURSE PRACTITIONER

## 2024-04-24 PROCEDURE — G0439 PPPS, SUBSEQ VISIT: HCPCS | Performed by: NURSE PRACTITIONER

## 2024-04-24 PROCEDURE — 99214 OFFICE O/P EST MOD 30 MIN: CPT | Performed by: NURSE PRACTITIONER

## 2024-04-24 PROCEDURE — 1159F MED LIST DOCD IN RCRD: CPT | Performed by: NURSE PRACTITIONER

## 2024-04-24 PROCEDURE — 1170F FXNL STATUS ASSESSED: CPT | Performed by: NURSE PRACTITIONER

## 2024-04-24 PROCEDURE — 87102 FUNGUS ISOLATION CULTURE: CPT | Performed by: NURSE PRACTITIONER

## 2024-04-24 PROCEDURE — 87205 SMEAR GRAM STAIN: CPT | Performed by: NURSE PRACTITIONER

## 2024-04-24 PROCEDURE — 3051F HG A1C>EQUAL 7.0%<8.0%: CPT | Performed by: NURSE PRACTITIONER

## 2024-04-24 PROCEDURE — 1160F RVW MEDS BY RX/DR IN RCRD: CPT | Performed by: NURSE PRACTITIONER

## 2024-04-24 RX ORDER — CITALOPRAM HYDROBROMIDE 10 MG/1
10 TABLET ORAL DAILY
Qty: 90 TABLET | Refills: 1 | Status: SHIPPED | OUTPATIENT
Start: 2024-04-24

## 2024-04-24 RX ORDER — PANTOPRAZOLE SODIUM 40 MG/1
40 TABLET, DELAYED RELEASE ORAL DAILY
Qty: 90 TABLET | Refills: 1 | Status: SHIPPED | OUTPATIENT
Start: 2024-04-24

## 2024-04-24 NOTE — PATIENT INSTRUCTIONS
Advance Directive    Advance directives are legal documents that allow you to make decisions about your health care and medical treatment in case you become unable to communicate for yourself. Advance directives let your wishes be known to family, friends, and health care providers.  Discussing and writing advance directives should happen over time rather than all at once. Advance directives can be changed and updated at any time. There are different types of advance directives, such as:  Medical power of .  Living will.  Do not resuscitate (DNR) order or do not attempt resuscitation (DNAR) order.  Health care proxy and medical power of   A health care proxy is also called a health care agent. This person is appointed to make medical decisions for you when you are unable to make decisions for yourself. Generally, people ask a trusted friend or family member to act as their proxy and represent their preferences. Make sure you have an agreement with your trusted person to act as your proxy. A proxy may have to make a medical decision on your behalf if your wishes are not known.  A medical power of , also called a durable power of  for health care, is a legal document that names your health care proxy. Depending on the laws in your state, the document may need to be:  Signed.  Notarized.  Dated.  Copied.  Witnessed.  Incorporated into your medical record.  You may also want to appoint a trusted person to manage your money in the event you are unable to do so. This is called a durable power of  for finances. It is a separate legal document from the durable power of  for health care. You may choose your health care proxy or someone different to act as your agent in money matters.  If you do not appoint a proxy, or there is a concern that the proxy is not acting in your best interest, a court may appoint a guardian to act on your behalf.  Living will  A living will is a set  of instructions that state your wishes about medical care when you cannot express them yourself. Health care providers should keep a copy of your living will in your medical record. You may want to give a copy to family members or friends. To alert caregivers in case of an emergency, you can place a card in your wallet to let them know that you have a living will and where they can find it. A living will is used if you become:  Terminally ill.  Disabled.  Unable to communicate or make decisions.  The following decisions should be included in your living will:  To use or not to use life support equipment, such as dialysis machines and breathing machines (ventilators).  Whether you want a DNR or DNAR order. This tells health care providers not to use cardiopulmonary resuscitation (CPR) if breathing or heartbeat stops.  To use or not to use tube feeding.  To be given or not to be given food and fluids.  Whether you want comfort (palliative) care when the goal becomes comfort rather than a cure.  Whether you want to donate your organs and tissues.  A living will does not give instructions for distributing your money and property if you should pass away.  DNR or DNAR  A DNR or DNAR order is a request not to have CPR in the event that your heart stops beating or you stop breathing. If a DNR or DNAR order has not been made and shared, a health care provider will try to help any patient whose heart has stopped or who has stopped breathing. If you plan to have surgery, talk with your health care provider about how your DNR or DNAR order will be followed if problems occur.  What if I do not have an advance directive?  Some states assign family decision makers to act on your behalf if you do not have an advance directive. Each state has its own laws about advance directives. You may want to check with your health care provider, , or state representative about the laws in your state.  Summary  Advance directives are  legal documents that allow you to make decisions about your health care and medical treatment in case you become unable to communicate for yourself.  The process of discussing and writing advance directives should happen over time. You can change and update advance directives at any time.  Advance directives may include a medical power of , a living will, and a DNR or DNAR order.  This information is not intended to replace advice given to you by your health care provider. Make sure you discuss any questions you have with your health care provider.  Document Revised: 09/21/2021 Document Reviewed: 09/21/2021  Elsevier Patient Education © 2024 [employer]

## 2024-04-24 NOTE — PROGRESS NOTES
The ABCs of the Annual Wellness Visit  Subsequent Medicare Wellness Visit    Subjective    Loretta Ren is a 69 y.o. female who presents for a Subsequent Medicare Wellness Visit.    The following portions of the patient's history were reviewed and   updated as appropriate: allergies, current medications, past family history, past medical history, past social history, past surgical history, and problem list.    Compared to one year ago, the patient feels her physical   health is the same.    Compared to one year ago, the patient feels her mental   health is the same.    Recent Hospitalizations:  She was not admitted to the hospital during the last year.       Current Medical Providers:  Patient Care Team:  Natalie Mukherjee APRN as PCP - General (Nurse Practitioner)  Deacon Rosales MD as Consulting Physician (Urology)  Kendra Traore APRN as Nurse Practitioner (Nurse Practitioner)    Outpatient Medications Prior to Visit   Medication Sig Dispense Refill    carvedilol (COREG) 3.125 MG tablet Take 1 tablet by mouth 2 (Two) Times a Day. 180 tablet 3    EPINEPHrine (EPIPEN) 0.3 MG/0.3ML solution auto-injector injection USE AS DIRECTED FOR acute allergic reaction      famotidine (Pepcid) 40 MG tablet Take 1 tablet by mouth every night at bedtime. 90 tablet 1    loratadine (CLARITIN) 10 MG tablet Take 1 tablet by mouth Daily As Needed.      multivitamin with minerals tablet tablet Take 1 tablet by mouth Daily.      citalopram (CeleXA) 10 MG tablet Take 1 tablet by mouth Daily. 90 tablet 1    pantoprazole (Protonix) 40 MG EC tablet Take 1 tablet by mouth Daily. 90 tablet 1    fluticasone (FLONASE) 50 MCG/ACT nasal spray 2 sprays into the nostril(s) as directed by provider Daily As Needed for Allergies or Rhinitis. (Patient not taking: Reported on 4/24/2024) 16 g 5     No facility-administered medications prior to visit.       No opioid medication identified on active medication list. I have reviewed chart for other  "potential  high risk medication/s and harmful drug interactions in the elderly.        Aspirin is not on active medication list.  Aspirin use is not indicated based on review of current medical condition/s. Risk of harm outweighs potential benefits.  .    Patient Active Problem List   Diagnosis    PVC (premature ventricular contraction)    Allergic rhinitis    Fibrosis lung    Generalized anxiety disorder    GERD (gastroesophageal reflux disease)    Osteopenia    Type 2 diabetes mellitus    Sarcoidosis    Nephrolithiasis    Ureteral stone     Advance Care Planning   Advance Care Planning     Advance Directive is not on file.  ACP discussion was declined by the patient. Patient does not have an advance directive, information provided.     Objective    Vitals:    24 0928   BP: 123/61   Pulse: 78   Resp: 16   Temp: 97.3 °F (36.3 °C)   SpO2: 98%   Weight: 62.6 kg (138 lb)   Height: 154.9 cm (61\")     Estimated body mass index is 26.07 kg/m² as calculated from the following:    Height as of this encounter: 154.9 cm (61\").    Weight as of this encounter: 62.6 kg (138 lb).           Does the patient have evidence of cognitive impairment? No    Lab Results   Component Value Date    TRIG 153 (H) 2024    HDL 46 2024     (H) 2024    VLDL 27 2024    HGBA1C 7.50 (H) 2024        HEALTH RISK ASSESSMENT    Smoking Status:  Social History     Tobacco Use   Smoking Status Never   Smokeless Tobacco Never     Alcohol Consumption:  Social History     Substance and Sexual Activity   Alcohol Use Never     Fall Risk Screen:    STEADI Fall Risk Assessment was completed, and patient is at LOW risk for falls.Assessment completed on:2024    Depression Screenin/24/2024     9:32 AM   PHQ-2/PHQ-9 Depression Screening   Little Interest or Pleasure in Doing Things 0-->not at all   Feeling Down, Depressed or Hopeless 0-->not at all   PHQ-9: Brief Depression Severity Measure Score 0 "       Health Habits and Functional and Cognitive Screenin/24/2024     9:29 AM   Functional & Cognitive Status   Do you have difficulty preparing food and eating? No   Do you have difficulty bathing yourself, getting dressed or grooming yourself? No   Do you have difficulty using the toilet? No   Do you have difficulty moving around from place to place? No   Do you have trouble with steps or getting out of a bed or a chair? No   Current Diet Well Balanced Diet   Dental Exam Up to date   Eye Exam Up to date        Eye Exam Comment [facility] [place]   Exercise (times per week) 5 times per week   Current Exercises Include Walking        Exercise Comment take care of grandchildren   Do you need help using the phone?  No   Are you deaf or do you have serious difficulty hearing?  No   Do you need help to go to places out of walking distance? No   Do you need help shopping? No   Do you need help preparing meals?  No   Do you need help with housework?  No   Do you need help with laundry? No   Do you need help taking your medications? No   Do you need help managing money? No   Do you ever drive or ride in a car without wearing a seat belt? No   Have you felt unusual stress, anger or loneliness in the last month? No   Who do you live with? Spouse   If you need help, do you have trouble finding someone available to you? No   Have you been bothered in the last four weeks by sexual problems? No   Do you have difficulty concentrating, remembering or making decisions? Yes       Age-appropriate Screening Schedule:  Refer to the list below for future screening recommendations based on patient's age, sex and/or medical conditions. Orders for these recommended tests are listed in the plan section. The patient has been provided with a written plan.    Health Maintenance   Topic Date Due    DIABETIC FOOT EXAM  Never done    DIABETIC EYE EXAM  2023    DXA SCAN  2024    Pneumococcal Vaccine 65+ (2 of 2 - PCV)  07/10/2024 (Originally 10/31/2015)    ZOSTER VACCINE (3 of 3) 07/10/2024 (Originally 2/5/2015)    COVID-19 Vaccine (1 - 2023-24 season) 01/20/2025 (Originally 9/1/2023)    TDAP/TD VACCINES (1 - Tdap) 01/26/2025 (Originally 4/10/1974)    RSV Vaccine - Adults (1 - 1-dose 60+ series) 03/13/2025 (Originally 4/10/2013)    INFLUENZA VACCINE  08/01/2024    HEMOGLOBIN A1C  10/16/2024    LIPID PANEL  04/16/2025    URINE MICROALBUMIN  04/16/2025    BMI FOLLOWUP  04/18/2025    ANNUAL WELLNESS VISIT  04/24/2025    COLORECTAL CANCER SCREENING  05/19/2025    MAMMOGRAM  05/25/2025    HEPATITIS C SCREENING  Completed                  CMS Preventative Services Quick Reference  Risk Factors Identified During Encounter  Fall Risk-High or Moderate: Discussed Fall Prevention in the home  Immunizations Discussed/Encouraged: Tdap, Prevnar 20 (Pneumococcal 20-valent conjugate), COVID19, and RSV (Respiratory Syncytial Virus)  Dental Screening Recommended  Vision Screening Recommended  The above risks/problems have been discussed with the patient.  Pertinent information has been shared with the patient in the After Visit Summary.  An After Visit Summary and PPPS were made available to the patient.    Follow Up:   Next Medicare Wellness visit to be scheduled in 1 year.       Additional E&M Note during same encounter follows:  Patient has multiple medical problems which are significant and separately identifiable that require additional work above and beyond the Medicare Wellness Visit.      Chief Complaint  Medicare Wellness-subsequent, Hypertension, Depression, and Heartburn (States her throat burn and meds are not helping - )    Subjective        HPI  Loretta Ren is also being seen today for   KARSTEN:  She is currently on celexa and is doing good.  GERD:  She is doing good with the pepcid and protonix.    Allergies:  She is doing her allergy shots and her reg meds too.  She has had sore throat.  She said the allergist told her it was acid  "reflux.  She has been taking her medicine and she is still getting more congestion and sore throat.  She has not had a fever.  PVC:  She is under cardiology care for Coreg.  She is going for a stress test and u/s on 5/29.  Holter comes off tomorrow.   DM:  She is still watching diet.    LIPID:  No meds overall ok with diet.  Review of Systems   Constitutional:  Negative for fatigue.   HENT:  Positive for sore throat.    Respiratory:  Negative for cough and shortness of breath.    Cardiovascular:  Negative for chest pain and leg swelling.   Gastrointestinal:  Negative for nausea and vomiting.   Psychiatric/Behavioral:  Negative for hallucinations and suicidal ideas.        Objective   Vital Signs:  /61   Pulse 78   Temp 97.3 °F (36.3 °C)   Resp 16   Ht 154.9 cm (61\")   Wt 62.6 kg (138 lb)   SpO2 98%   BMI 26.07 kg/m²     Physical Exam  Vitals reviewed.   Constitutional:       Appearance: Normal appearance. She is well-developed.   HENT:      Right Ear: Tympanic membrane and ear canal normal.      Left Ear: Tympanic membrane and ear canal normal.      Nose: Rhinorrhea present.      Mouth/Throat:      Pharynx: Posterior oropharyngeal erythema present. No oropharyngeal exudate.   Cardiovascular:      Rate and Rhythm: Normal rate and regular rhythm.      Heart sounds: Normal heart sounds. No murmur heard.  Pulmonary:      Effort: Pulmonary effort is normal.      Breath sounds: Normal breath sounds.   Neurological:      Mental Status: She is alert and oriented to person, place, and time.      Cranial Nerves: No cranial nerve deficit.      Motor: No weakness.   Psychiatric:         Mood and Affect: Mood and affect normal.          The following data was reviewed by: KIAN Lyons on 04/24/2024:  Common labs          10/2/2023    08:28 4/16/2024    08:27   Common Labs   Glucose 121  131    BUN 11  11    Creatinine 0.64  0.85    Sodium 138  136    Potassium 4.5  4.6    Chloride 102  101    Calcium " 9.4  9.6    Albumin 4.4  4.5    Total Bilirubin 0.7  0.7    Alkaline Phosphatase 77  84    AST (SGOT) 21  27    ALT (SGPT) 24  27    WBC 6.07  5.83    Hemoglobin 15.5  15.1    Hematocrit 45.3  44.1    Platelets 203  205    Total Cholesterol 175  208    Triglycerides 105  153    HDL Cholesterol 46  46    LDL Cholesterol  110  135    Hemoglobin A1C 7.00  7.50    Microalbumin, Urine <1.2  <1.2                 Assessment and Plan   Diagnoses and all orders for this visit:    1. Medicare annual wellness visit, subsequent (Primary)    2. Generalized anxiety disorder  -     citalopram (CeleXA) 10 MG tablet; Take 1 tablet by mouth Daily.  Dispense: 90 tablet; Refill: 1    3. Gastroesophageal reflux disease without esophagitis  -     pantoprazole (Protonix) 40 MG EC tablet; Take 1 tablet by mouth Daily.  Dispense: 90 tablet; Refill: 1    4. Postmenopause  -     DEXA Bone Density Axial; Future    5. Screening mammogram for breast cancer  -     Mammo Screening Digital Tomosynthesis Bilateral With CAD; Future    6. Seasonal allergic rhinitis due to pollen    7. Sore throat  -     Wound Culture - Wound, Oral Cavity  -     Cancel: Yeast Culture - Reference - Swab, Throat  -     Fungus Culture - , Oropharynx    8. Type 2 diabetes mellitus without complication, without long-term current use of insulin  -     CBC & Differential; Future  -     Comprehensive Metabolic Panel; Future  -     TSH; Future  -     Lipid Panel; Future  -     Hemoglobin A1c; Future  -     MicroAlbumin, Urine, Random - Urine, Clean Catch; Future             Follow Up   Return in about 6 months (around 10/24/2024).  Patient was given instructions and counseling regarding her condition or for health maintenance advice. Please see specific information pulled into the AVS if appropriate.   Follow-up in 6 months for labs and appt. Call with any concerns or questions that you may have regarding your medications or history.    I have reviewed all medications and at  this time no medications changes need to be adjusted for all chronic conditions.  I will culture her throat as she looks more yeasty than actual infection but we will see what it shows.  Natalie Mukherjee, APRN  04/24/2024

## 2024-04-26 LAB
BACTERIA SPEC AEROBE CULT: NORMAL
GRAM STN SPEC: NORMAL
GRAM STN SPEC: NORMAL

## 2024-05-01 ENCOUNTER — TELEPHONE (OUTPATIENT)
Dept: CARDIOLOGY | Facility: CLINIC | Age: 69
End: 2024-05-01
Payer: MEDICARE

## 2024-05-01 LAB — FUNGUS WND CULT: NORMAL

## 2024-05-01 NOTE — TELEPHONE ENCOUNTER
----- Message from Natalie ALBRIGHT sent at 4/30/2024  7:01 PM EDT -----  Holter monitor shows normal sinus rhythm with average heart rate of 79 bpm.  There were occasional PVCs and PACs which can cause palpitations.  They are commonly linked to caffeine intake, stress and anxiety, alcohol intake, and nicotine use.  Continue current dose of carvedilol.  Continue with stress test and carotid study as scheduled

## 2024-05-08 LAB — FUNGUS WND CULT: NORMAL

## 2024-05-15 LAB — FUNGUS WND CULT: NORMAL

## 2024-05-22 LAB — FUNGUS WND CULT: NORMAL

## 2024-05-29 ENCOUNTER — HOSPITAL ENCOUNTER (OUTPATIENT)
Dept: BONE DENSITY | Facility: HOSPITAL | Age: 69
Discharge: HOME | End: 2024-05-29
Payer: MEDICARE

## 2024-05-29 ENCOUNTER — HOSPITAL ENCOUNTER (OUTPATIENT)
Dept: MAMMOGRAPHY | Facility: HOSPITAL | Age: 69
Discharge: HOME | End: 2024-05-29
Payer: MEDICARE

## 2024-05-29 ENCOUNTER — HOSPITAL ENCOUNTER (OUTPATIENT)
Dept: NUCLEAR MEDICINE | Facility: HOSPITAL | Age: 69
Discharge: HOME | End: 2024-05-29
Payer: MEDICARE

## 2024-05-29 ENCOUNTER — HOSPITAL ENCOUNTER (OUTPATIENT)
Facility: HOSPITAL | Age: 69
Discharge: HOME | End: 2024-05-29
Payer: MEDICARE

## 2024-05-29 DIAGNOSIS — Z12.31 SCREENING MAMMOGRAM FOR BREAST CANCER: ICD-10-CM

## 2024-05-29 DIAGNOSIS — R42 DIZZINESS: ICD-10-CM

## 2024-05-29 DIAGNOSIS — R94.31 ABNORMAL ELECTROCARDIOGRAM (ECG) (EKG): ICD-10-CM

## 2024-05-29 DIAGNOSIS — Z78.0 POSTMENOPAUSE: ICD-10-CM

## 2024-05-29 LAB
BH CV XLRA MEAS LEFT CAROTID BULB EDV: 8.1 CM/SEC
BH CV XLRA MEAS LEFT CAROTID BULB PSV: 27.1 CM/SEC
BH CV XLRA MEAS LEFT DIST CCA EDV: 23 CM/SEC
BH CV XLRA MEAS LEFT DIST CCA PSV: 67.3 CM/SEC
BH CV XLRA MEAS LEFT DIST ICA EDV: 30.9 CM/SEC
BH CV XLRA MEAS LEFT DIST ICA PSV: 79.1 CM/SEC
BH CV XLRA MEAS LEFT ICA/CCA RATIO: 0.4
BH CV XLRA MEAS LEFT MID ICA EDV: 42.3 CM/SEC
BH CV XLRA MEAS LEFT MID ICA PSV: 102.9 CM/SEC
BH CV XLRA MEAS LEFT PROX CCA EDV: 19.6 CM/SEC
BH CV XLRA MEAS LEFT PROX CCA PSV: 57.7 CM/SEC
BH CV XLRA MEAS LEFT PROX ECA EDV: 16.1 CM/SEC
BH CV XLRA MEAS LEFT PROX ECA PSV: 57.1 CM/SEC
BH CV XLRA MEAS LEFT PROX ICA EDV: 12.1 CM/SEC
BH CV XLRA MEAS LEFT PROX ICA PSV: 28.8 CM/SEC
BH CV XLRA MEAS LEFT VERTEBRAL A EDV: 14.3 CM/SEC
BH CV XLRA MEAS LEFT VERTEBRAL A PSV: 40.5 CM/SEC
BH CV XLRA MEAS RIGHT CAROTID BULB EDV: 16.9 CM/SEC
BH CV XLRA MEAS RIGHT CAROTID BULB PSV: 46.1 CM/SEC
BH CV XLRA MEAS RIGHT DIST CCA EDV: 28.6 CM/SEC
BH CV XLRA MEAS RIGHT DIST CCA PSV: 88.3 CM/SEC
BH CV XLRA MEAS RIGHT DIST ICA EDV: 35.2 CM/SEC
BH CV XLRA MEAS RIGHT DIST ICA PSV: 85.2 CM/SEC
BH CV XLRA MEAS RIGHT ICA/CCA RATIO: 0.5
BH CV XLRA MEAS RIGHT MID ICA EDV: 39.9 CM/SEC
BH CV XLRA MEAS RIGHT MID ICA PSV: 84.2 CM/SEC
BH CV XLRA MEAS RIGHT PROX CCA EDV: 23.4 CM/SEC
BH CV XLRA MEAS RIGHT PROX CCA PSV: 119.4 CM/SEC
BH CV XLRA MEAS RIGHT PROX ECA EDV: 10.9 CM/SEC
BH CV XLRA MEAS RIGHT PROX ECA PSV: 50.8 CM/SEC
BH CV XLRA MEAS RIGHT PROX ICA EDV: 21.8 CM/SEC
BH CV XLRA MEAS RIGHT PROX ICA PSV: 48.4 CM/SEC
BH CV XLRA MEAS RIGHT VERTEBRAL A EDV: 17.3 CM/SEC
BH CV XLRA MEAS RIGHT VERTEBRAL A PSV: 48.5 CM/SEC
LEFT ARM BP: NORMAL MMHG
RIGHT ARM BP: NORMAL MMHG

## 2024-05-29 PROCEDURE — 77067 SCR MAMMO BI INCL CAD: CPT

## 2024-05-29 PROCEDURE — 25010000002 REGADENOSON 0.4 MG/5ML SOLUTION: Performed by: NURSE PRACTITIONER

## 2024-05-29 PROCEDURE — 77080 DXA BONE DENSITY AXIAL: CPT

## 2024-05-29 PROCEDURE — A9502 TC99M TETROFOSMIN: HCPCS | Performed by: NURSE PRACTITIONER

## 2024-05-29 PROCEDURE — 93880 EXTRACRANIAL BILAT STUDY: CPT

## 2024-05-29 PROCEDURE — 78452 HT MUSCLE IMAGE SPECT MULT: CPT

## 2024-05-29 PROCEDURE — 77063 BREAST TOMOSYNTHESIS BI: CPT

## 2024-05-29 PROCEDURE — 93017 CV STRESS TEST TRACING ONLY: CPT

## 2024-05-29 PROCEDURE — 0 TECHNETIUM TETROFOSMIN KIT: Performed by: NURSE PRACTITIONER

## 2024-05-29 RX ORDER — REGADENOSON 0.08 MG/ML
0.4 INJECTION, SOLUTION INTRAVENOUS
Status: COMPLETED | OUTPATIENT
Start: 2024-05-29 | End: 2024-05-29

## 2024-05-29 RX ADMIN — TETROFOSMIN 1 DOSE: 1.38 INJECTION, POWDER, LYOPHILIZED, FOR SOLUTION INTRAVENOUS at 10:36

## 2024-05-29 RX ADMIN — TETROFOSMIN 1 DOSE: 1.38 INJECTION, POWDER, LYOPHILIZED, FOR SOLUTION INTRAVENOUS at 09:10

## 2024-05-29 RX ADMIN — REGADENOSON 0.4 MG: 0.08 INJECTION, SOLUTION INTRAVENOUS at 10:35

## 2024-05-30 ENCOUNTER — TELEPHONE (OUTPATIENT)
Dept: CARDIOLOGY | Facility: CLINIC | Age: 69
End: 2024-05-30
Payer: MEDICARE

## 2024-05-30 NOTE — TELEPHONE ENCOUNTER
PER KIAN MCKEON-  Carotid study shows normal blood flow bilaterally, follow up as scheduled       Sent via my chart.

## 2024-05-31 ENCOUNTER — TELEPHONE (OUTPATIENT)
Dept: CARDIOLOGY | Facility: CLINIC | Age: 69
End: 2024-05-31
Payer: MEDICARE

## 2024-05-31 LAB
BH CV IMMEDIATE POST TECH DATA BLOOD PRESSURE: NORMAL MMHG
BH CV IMMEDIATE POST TECH DATA HEART RATE: 138 BPM
BH CV IMMEDIATE POST TECH DATA OXYGEN SATS: 97 %
BH CV REST NUCLEAR ISOTOPE DOSE: 9.7 MCI
BH CV SIX MINUTE RECOVERY TECH DATA BLOOD PRESSURE: NORMAL
BH CV SIX MINUTE RECOVERY TECH DATA HEART RATE: 97 BPM
BH CV SIX MINUTE RECOVERY TECH DATA OXYGEN SATURATION: 98 %
BH CV STRESS BP STAGE 1: NORMAL
BH CV STRESS BP STAGE 2: NORMAL
BH CV STRESS COMMENTS STAGE 1: NORMAL
BH CV STRESS COMMENTS STAGE 2: NORMAL
BH CV STRESS DOSE REGADENOSON STAGE 1: 0.4
BH CV STRESS DURATION MIN STAGE 1: 0
BH CV STRESS DURATION MIN STAGE 2: 4
BH CV STRESS DURATION SEC STAGE 1: 10
BH CV STRESS DURATION SEC STAGE 2: 0
BH CV STRESS HR STAGE 1: 115
BH CV STRESS HR STAGE 2: 146
BH CV STRESS NUCLEAR ISOTOPE DOSE: 38 MCI
BH CV STRESS O2 STAGE 1: 95
BH CV STRESS O2 STAGE 2: 97
BH CV STRESS PROTOCOL 1: NORMAL
BH CV STRESS RECOVERY BP: NORMAL MMHG
BH CV STRESS RECOVERY HR: 97 BPM
BH CV STRESS RECOVERY O2: 98 %
BH CV STRESS STAGE 1: 1
BH CV STRESS STAGE 2: 2
BH CV THREE MINUTE POST TECH DATA BLOOD PRESSURE: NORMAL MMHG
BH CV THREE MINUTE POST TECH DATA HEART RATE: 114 BPM
BH CV THREE MINUTE POST TECH DATA OXYGEN SATURATION: 98 %
LV EF NUC BP: 79 %
MAXIMAL PREDICTED HEART RATE: 149 BPM
PERCENT MAX PREDICTED HR: 97.99 %
STRESS BASELINE BP: NORMAL MMHG
STRESS BASELINE HR: 72 BPM
STRESS O2 SAT REST: 96 %
STRESS PERCENT HR: 115 %
STRESS POST O2 SAT PEAK: 98 %
STRESS POST PEAK BP: NORMAL MMHG
STRESS POST PEAK HR: 146 BPM
STRESS TARGET HR: 127 BPM

## 2024-05-31 NOTE — TELEPHONE ENCOUNTER
PER TAVO PITTMAN, APRN-  Stress test is negative for ischemia, follow up as scheduled, notify office if symptoms persist/worsen     Sent via my chart.

## 2024-07-16 ENCOUNTER — OFFICE VISIT (OUTPATIENT)
Dept: GASTROENTEROLOGY | Facility: CLINIC | Age: 69
End: 2024-07-16
Payer: MEDICARE

## 2024-07-16 VITALS
HEART RATE: 80 BPM | DIASTOLIC BLOOD PRESSURE: 67 MMHG | WEIGHT: 136.8 LBS | BODY MASS INDEX: 25.83 KG/M2 | OXYGEN SATURATION: 95 % | HEIGHT: 61 IN | SYSTOLIC BLOOD PRESSURE: 103 MMHG

## 2024-07-16 DIAGNOSIS — K29.50 CHRONIC GASTRITIS WITHOUT BLEEDING, UNSPECIFIED GASTRITIS TYPE: Primary | ICD-10-CM

## 2024-07-16 DIAGNOSIS — K31.7 FUNDIC GLAND POLYPOSIS OF STOMACH: ICD-10-CM

## 2024-07-16 NOTE — PROGRESS NOTES
Chief Complaint     Abdominal Pain, Heartburn, and Follow-up    History of Present Illness     Loretta Ren is a 69 y.o. female who presents to [facility] for follow-up of epigastric pain and GERD.    She reports that symptoms are improved.  Epigastric pain is resolved.  Acid reflux symptoms are improved and controlled with pantoprazole and Pepcid per primary care provider.       History      Past Medical History:   Diagnosis Date    Allergic rhinitis     Anxiety 11/06/2017    IMPROVED SINCE STARTING TRINTELLIX    Duodenal ulcer     Facial pain 11/06/2017    THE PATIENT REPORTS A TRANSIENT BOUT OF FACIAL PAIN WITH RADIATION TOEARDS THE RIGHT EAR. SHE NOTES THAT THIS HAS RESOLVED. 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    GERD (gastroesophageal reflux disease) 11/06/2018    Hemorrhoids     Hyperlipidemia 11/06/2018    Osteopenia 04/29/2019    PONV (postoperative nausea and vomiting)     PVC (premature ventricular contraction) 08/04/2021    Renal calculus or stone     Sarcoidosis     Sinus trouble     Type 2 diabetes mellitus with hyperglycemia, without long-term current use of insulin 04/29/2019     Past Surgical History:   Procedure Laterality Date    CHOLECYSTECTOMY      COLONOSCOPY  2008    cologard 2018    CYSTOSCOPY      CYSTOSCOPY URETEROSCOPY Right 03/02/2022    Procedure: CYSTOSCOPY, RIGHT URETEROSCOPY, STONE BASKET EXTRACTION AND RIGHT URETERAL STENT INSERTION;  Surgeon: Deacon Rosales MD;  Location: [facility];  Service: Urology;  Laterality: Right;    CYSTOSCOPY W/ URETERAL STENT PLACEMENT Right 02/18/2022    Procedure: Cystoscopy  with right ureteral stent placement;  Surgeon: Deacon Rosales MD;  Location: [facility] OR;  Service: Urology;  Laterality: Right;    ENDOSCOPY      ENDOSCOPY N/A 3/6/2024    Procedure: ESOPHAGOGASTRODUODENOSCOPY WITH BIOPSIES;  Surgeon: Ava Rosen MD;   "Location: [facility] END[facility];  Service: Gastroenterology;  Laterality: N/A;  GASTRIC POLYPS, GASTRITIS    LUNG BIOPSY  1992    SARCOIDOSIS, NO CANCER    UPPER GASTROINTESTINAL ENDOSCOPY      2011 at [facility]    URETEROSCOPY LASER LITHOTRIPSY WITH STENT INSERTION       Family History   Problem Relation Age of Onset    Kidney cancer Father 78        bladder    Breast cancer Mother 80    Heart disease Mother     Heart disease Daughter     Malig Hyperthermia Neg Hx     Ovarian cancer Neg Hx     Uterine cancer Neg Hx     Prostate cancer Neg Hx     Colon cancer Neg Hx         Current Medications       Current Outpatient Medications:     carvedilol (COREG) 3.125 MG tablet, Take 1 tablet by mouth 2 (Two) Times a Day., Disp: 180 tablet, Rfl: 3    citalopram (CeleXA) 10 MG tablet, Take 1 tablet by mouth Daily., Disp: 90 tablet, Rfl: 1    EPINEPHrine (EPIPEN) 0.3 MG/0.3ML solution auto-injector injection, USE AS DIRECTED FOR acute allergic reaction, Disp: , Rfl:     famotidine (Pepcid) 40 MG tablet, Take 1 tablet by mouth every night at bedtime., Disp: 90 tablet, Rfl: 1    loratadine (CLARITIN) 10 MG tablet, Take 1 tablet by mouth Daily As Needed., Disp: , Rfl:     multivitamin with minerals tablet tablet, Take 1 tablet by mouth Daily., Disp: , Rfl:     pantoprazole (Protonix) 40 MG EC tablet, Take 1 tablet by mouth Daily., Disp: 90 tablet, Rfl: 1     Allergies     Allergies   Allergen Reactions    Aspirin Swelling    Ceftriaxone Rash       Social History       Social History     Social History Narrative    Not on file         Objective       /67 (BP Location: Left arm, Patient Position: Sitting, Cuff Size: Adult)   Pulse 80   Ht 154.9 cm (61\")   Wt 62.1 kg (136 lb 12.8 oz)   SpO2 95%   BMI 25.85 kg/m²       Physical Exam    Results       Result Review :    The following data was reviewed by: KIAN Parikh on 07/16/2024:    3/6/2024 EGD-the examined esophagus was normal.  Diffuse mild inflammation characterized " by erythema in the gastric antrum, biopsy-mild reactive gastropathy, negative for H. pylori.  Multiple small sessile polyps found in the gastric fundus and gastric body, biopsy-fundic gland polyps.  Normal duodenum.    CBC w/diff          10/2/2023    08:28 4/16/2024    08:27   CBC w/Diff   WBC 6.07  5.83    RBC 5.02  4.94    Hemoglobin 15.5  15.1    Hematocrit 45.3  44.1    MCV 90.2  89.3    MCH 30.9  30.6    MCHC 34.2  34.2    RDW 12.6  12.8    Platelets 203  205    Neutrophil Rel % 68.0  59.3    Immature Granulocyte Rel % 0.2  0.3    Lymphocyte Rel % 22.7  30.5    Monocyte Rel % 7.1  7.9    Eosinophil Rel % 1.8  1.7    Basophil Rel % 0.2  0.3      CMP          10/2/2023    08:28 4/16/2024    08:27   CMP   Glucose 121  131    BUN 11  11    Creatinine 0.64  0.85    EGFR 95.2  73.4    Sodium 138  136    Potassium 4.5  4.6    Chloride 102  101    Calcium 9.4  9.6    Total Protein 7.0  7.3    Albumin 4.4  4.5    Globulin 2.6  2.8    Total Bilirubin 0.7  0.7    Alkaline Phosphatase 77  84    AST (SGOT) 21  27    ALT (SGPT) 24  27    Albumin/Globulin Ratio 1.7  1.6    BUN/Creatinine Ratio 17.2  12.9    Anion Gap 11.1  10.6                 Assessment and Plan              Diagnoses and all orders for this visit:    1. Chronic gastritis without bleeding, unspecified gastritis type (Primary)    2. Fundic gland polyposis of stomach      Recommend to avoid NSAIDs and continue pantoprazole.        Follow Up     Follow Up   Return if symptoms worsen or fail to improve.  Patient was given instructions and counseling regarding her condition or for health maintenance advice. Please see specific information pulled into the AVS if appropriate.

## 2024-08-14 DIAGNOSIS — K21.9 GASTROESOPHAGEAL REFLUX DISEASE WITHOUT ESOPHAGITIS: ICD-10-CM

## 2024-08-14 RX ORDER — FAMOTIDINE 40 MG/1
40 TABLET, FILM COATED ORAL
Qty: 90 TABLET | Refills: 1 | Status: SHIPPED | OUTPATIENT
Start: 2024-08-14

## 2024-08-14 NOTE — TELEPHONE ENCOUNTER
Pt is requesting a refill of Famotidine 40 MG tablet     Last refill: 7/19/2024   Last ov: 7/16/24  Next ov: n/a

## 2024-09-04 ENCOUNTER — OFFICE VISIT (OUTPATIENT)
Dept: FAMILY MEDICINE CLINIC | Facility: CLINIC | Age: 69
End: 2024-09-04
Payer: MEDICARE

## 2024-09-04 VITALS
TEMPERATURE: 97.1 F | DIASTOLIC BLOOD PRESSURE: 73 MMHG | HEIGHT: 61 IN | SYSTOLIC BLOOD PRESSURE: 128 MMHG | WEIGHT: 139.4 LBS | BODY MASS INDEX: 26.32 KG/M2 | HEART RATE: 71 BPM | RESPIRATION RATE: 16 BRPM | OXYGEN SATURATION: 98 %

## 2024-09-04 DIAGNOSIS — R20.0 NUMBNESS AND TINGLING: Primary | ICD-10-CM

## 2024-09-04 DIAGNOSIS — R20.2 NUMBNESS AND TINGLING: Primary | ICD-10-CM

## 2024-09-04 PROCEDURE — G2211 COMPLEX E/M VISIT ADD ON: HCPCS | Performed by: NURSE PRACTITIONER

## 2024-09-04 PROCEDURE — 1160F RVW MEDS BY RX/DR IN RCRD: CPT | Performed by: NURSE PRACTITIONER

## 2024-09-04 PROCEDURE — 1126F AMNT PAIN NOTED NONE PRSNT: CPT | Performed by: NURSE PRACTITIONER

## 2024-09-04 PROCEDURE — 1159F MED LIST DOCD IN RCRD: CPT | Performed by: NURSE PRACTITIONER

## 2024-09-04 PROCEDURE — 99214 OFFICE O/P EST MOD 30 MIN: CPT | Performed by: NURSE PRACTITIONER

## 2024-09-04 PROCEDURE — 3051F HG A1C>EQUAL 7.0%<8.0%: CPT | Performed by: NURSE PRACTITIONER

## 2024-09-04 RX ORDER — TIZANIDINE 2 MG/1
2 TABLET ORAL NIGHTLY PRN
Qty: 15 TABLET | Refills: 0 | Status: SHIPPED | OUTPATIENT
Start: 2024-09-04

## 2024-09-05 NOTE — PROGRESS NOTES
Chief Complaint  Leg Pain (States started left hip and down leg and now both legs feels numb and tingling)    Subjective          Loretta Ren presents to [facility]  Leg Pain       She is taking her allergy shots.  She says she still has some runny nose but she is doing better.  She said that she for started to have pain in her hip and started on the left and went to the right and she started having some tingling down to the big toe this started both legs about a month to 2 months ago now she said the hip does not hurt as much and she is able to walk.  She said walking has actually helped it.  She said that she does not want to do any further testing right now.  She has taken some Tylenol which has helped but she said mainly the walking has done a lot of good.                   Allergies  Aspirin and Ceftriaxone    Social History     Tobacco Use    Smoking status: Never    Smokeless tobacco: Never   Vaping Use    Vaping status: Never Used   Substance Use Topics    Alcohol use: Never    Drug use: Never       Family History   Problem Relation Age of Onset    Kidney cancer Father 78        bladder    Breast cancer Mother 80    Heart disease Mother     Heart disease Daughter     Malig Hyperthermia Neg Hx     Ovarian cancer Neg Hx     Uterine cancer Neg Hx     Prostate cancer Neg Hx     Colon cancer Neg Hx         Health Maintenance Due   Topic Date Due    DIABETIC FOOT EXAM  Never done    DIABETIC EYE EXAM  03/31/2023    INFLUENZA VACCINE  08/01/2024    HEMOGLOBIN A1C  10/16/2024        Immunization History   Administered Date(s) Administered    Flu Vaccine Quad PF >36MO 10/06/2015, 12/01/2016, 10/03/2017, 10/10/2019, 10/06/2020    Fluzone High-Dose 65+yrs 09/30/2021, 10/17/2022, 10/02/2023    Fluzone Quad >6mos (Multi-dose) 10/19/2018    Influenza TIV (IM) 10/31/2014    Influenza, Unspecified 10/06/2020    PEDS-Pneumococcal Conjugate (PCV7) 10/31/2014    Pneumococcal Conjugate  "Unspecified 10/31/2014    Pneumococcal Polysaccharide (PPSV23) 10/31/2014    Shingrix 12/11/2014    Zostavax 12/11/2014       Review of Systems   Musculoskeletal:  Positive for arthralgias, back pain, gait problem and myalgias.        Objective       Vitals:    09/04/24 1037   BP: 128/73   Pulse: 71   Resp: 16   Temp: 97.1 °F (36.2 °C)   SpO2: 98%   Weight: 63.2 kg (139 lb 6.4 oz)   Height: 154.9 cm (61\")       Body mass index is 26.34 kg/m².         Physical Exam  Constitutional:       Appearance: Normal appearance.   HENT:      Head: Normocephalic.   Pulmonary:      Effort: Pulmonary effort is normal.   Musculoskeletal:         General: Normal range of motion.   Skin:     Findings: No bruising.   Neurological:      General: No focal deficit present.      Mental Status: She is alert and oriented to person, place, and time.   Psychiatric:         Mood and Affect: Mood normal.         Behavior: Behavior normal.         Thought Content: Thought content normal.         Judgment: Judgment normal.               Result Review :     The following data was reviewed by: KIAN Lyons on 09/04/2024:            DEXA Bone Density Axial    Result Date: 5/29/2024  Impression: Osteopenia - Based upon the FRAX score, this patient does meet criteria for initiation of pharmacological treatment recommendations for prevention of osteoporosis per the National Osteoporosis Foundation (NOF) guidelines. However, individualized treatment decisions should be made accounting for additional clinical factors.   Electronically Signed By-Sam Doe MD On:5/29/2024 3:28 PM      Mammo Screening Digital Tomosynthesis Bilateral With CAD    Result Date: 5/29/2024  Benign findings. No mammographic evidence of malignancy. Recommend routine mammographic screening.  BI-RADS ASSESSMENT: BI-RADS 2. Benign findings.  The patient's information is entered into a computerized reminder system with a targeted due date for the next mammogram.  " Note:  It has been reported that there is approximately a 15% false negative rate in mammography.  Therefore, management of a palpable abnormality should not be deferred because of a negative mammogram.     Electronically Signed By-AYAN OLSEN MD On:5/29/2024 11:28 AM                  Assessment and Plan      Assessment & Plan  Numbness and tingling  We did discuss about L4 and L5 along with the sciatica issues.  We will start with a very low-dose muscle relaxer as needed.  She declines to do any type of x-rays PT OT currently.  She will keep me posted if anything changes.  We did discuss in depth about the nerve pathways and I did show her pictures also.     New Medications Ordered This Visit   Medications    tiZANidine (ZANAFLEX) 2 MG tablet     Sig: Take 1 tablet by mouth At Night As Needed (tingling/legs).     Dispense:  15 tablet     Refill:  0          Diagnosis Plan   1. Numbness and tingling  tiZANidine (ZANAFLEX) 2 MG tablet            I spent 31 minutes caring for Loretta on this date of service. This time includes time spent by me in the following activities:preparing for the visit, reviewing tests, obtaining and/or reviewing a separately obtained history, performing a medically appropriate examination and/or evaluation , counseling and educating the patient/family/caregiver, ordering medications, tests, or procedures, and documenting information in the medical record    Follow Up     Return if symptoms worsen or fail to improve.    Patient was given instructions and counseling regarding her condition or for health maintenance advice. Please see specific information pulled into the AVS if appropriate.     Parts of this note are electronic transcriptions/translations of spoken language to printed text using the Dragon Dictation system.          Natalie Mukherjee, APRN  09/04/2024

## 2024-10-11 ENCOUNTER — FLU SHOT (OUTPATIENT)
Dept: FAMILY MEDICINE CLINIC | Facility: CLINIC | Age: 69
End: 2024-10-11
Payer: MEDICARE

## 2024-10-11 DIAGNOSIS — Z23 FLU VACCINE NEED: Primary | ICD-10-CM

## 2024-10-11 PROCEDURE — G0008 ADMIN INFLUENZA VIRUS VAC: HCPCS | Performed by: NURSE PRACTITIONER

## 2024-10-11 PROCEDURE — 90662 IIV NO PRSV INCREASED AG IM: CPT | Performed by: NURSE PRACTITIONER

## 2024-10-11 NOTE — PROGRESS NOTES
PT WAS ADVISED TO WAIT 15 MINUTES AFTER RECEIVING SHOT. PT LEFT THE FACILITY BEFORE 15 MIN WAS UP AND THEN RETURNED TO FINISH OUT SHOT TIME

## 2024-10-15 DIAGNOSIS — F41.1 GENERALIZED ANXIETY DISORDER: ICD-10-CM

## 2024-10-15 RX ORDER — CITALOPRAM HYDROBROMIDE 10 MG/1
10 TABLET ORAL DAILY
Qty: 90 TABLET | Refills: 1 | OUTPATIENT
Start: 2024-10-15

## 2024-10-17 ENCOUNTER — LAB (OUTPATIENT)
Dept: FAMILY MEDICINE CLINIC | Facility: CLINIC | Age: 69
End: 2024-10-17
Payer: MEDICARE

## 2024-10-17 DIAGNOSIS — F41.1 GENERALIZED ANXIETY DISORDER: ICD-10-CM

## 2024-10-17 DIAGNOSIS — E11.9 TYPE 2 DIABETES MELLITUS WITHOUT COMPLICATION, WITHOUT LONG-TERM CURRENT USE OF INSULIN: ICD-10-CM

## 2024-10-17 LAB
ALBUMIN SERPL-MCNC: 4.6 G/DL (ref 3.5–5.2)
ALBUMIN UR-MCNC: 1.6 MG/DL
ALBUMIN/GLOB SERPL: 1.4 G/DL
ALP SERPL-CCNC: 99 U/L (ref 39–117)
ALT SERPL W P-5'-P-CCNC: 29 U/L (ref 1–33)
ANION GAP SERPL CALCULATED.3IONS-SCNC: 8.3 MMOL/L (ref 5–15)
AST SERPL-CCNC: 21 U/L (ref 1–32)
BASOPHILS # BLD AUTO: 0.01 10*3/MM3 (ref 0–0.2)
BASOPHILS NFR BLD AUTO: 0.1 % (ref 0–1.5)
BILIRUB SERPL-MCNC: 0.6 MG/DL (ref 0–1.2)
BUN SERPL-MCNC: 9 MG/DL (ref 8–23)
BUN/CREAT SERPL: 10.5 (ref 7–25)
CALCIUM SPEC-SCNC: 10.1 MG/DL (ref 8.6–10.5)
CHLORIDE SERPL-SCNC: 101 MMOL/L (ref 98–107)
CHOLEST SERPL-MCNC: 196 MG/DL (ref 0–200)
CO2 SERPL-SCNC: 26.7 MMOL/L (ref 22–29)
CREAT SERPL-MCNC: 0.86 MG/DL (ref 0.57–1)
DEPRECATED RDW RBC AUTO: 40.5 FL (ref 37–54)
EGFRCR SERPLBLD CKD-EPI 2021: 72.3 ML/MIN/1.73
EOSINOPHIL # BLD AUTO: 0.13 10*3/MM3 (ref 0–0.4)
EOSINOPHIL NFR BLD AUTO: 1.7 % (ref 0.3–6.2)
ERYTHROCYTE [DISTWIDTH] IN BLOOD BY AUTOMATED COUNT: 12.4 % (ref 12.3–15.4)
GLOBULIN UR ELPH-MCNC: 3.3 GM/DL
GLUCOSE SERPL-MCNC: 127 MG/DL (ref 65–99)
HBA1C MFR BLD: 7.1 % (ref 4.8–5.6)
HCT VFR BLD AUTO: 45.8 % (ref 34–46.6)
HDLC SERPL-MCNC: 46 MG/DL (ref 40–60)
HGB BLD-MCNC: 16.1 G/DL (ref 12–15.9)
IMM GRANULOCYTES # BLD AUTO: 0.03 10*3/MM3 (ref 0–0.05)
IMM GRANULOCYTES NFR BLD AUTO: 0.4 % (ref 0–0.5)
LDLC SERPL CALC-MCNC: 116 MG/DL (ref 0–100)
LDLC/HDLC SERPL: 2.4 {RATIO}
LYMPHOCYTES # BLD AUTO: 1.33 10*3/MM3 (ref 0.7–3.1)
LYMPHOCYTES NFR BLD AUTO: 17.2 % (ref 19.6–45.3)
MCH RBC QN AUTO: 31.9 PG (ref 26.6–33)
MCHC RBC AUTO-ENTMCNC: 35.2 G/DL (ref 31.5–35.7)
MCV RBC AUTO: 90.9 FL (ref 79–97)
MONOCYTES # BLD AUTO: 0.57 10*3/MM3 (ref 0.1–0.9)
MONOCYTES NFR BLD AUTO: 7.4 % (ref 5–12)
NEUTROPHILS NFR BLD AUTO: 5.66 10*3/MM3 (ref 1.7–7)
NEUTROPHILS NFR BLD AUTO: 73.2 % (ref 42.7–76)
NRBC BLD AUTO-RTO: 0 /100 WBC (ref 0–0.2)
PLATELET # BLD AUTO: 223 10*3/MM3 (ref 140–450)
PMV BLD AUTO: 11.2 FL (ref 6–12)
POTASSIUM SERPL-SCNC: 4.2 MMOL/L (ref 3.5–5.2)
PROT SERPL-MCNC: 7.9 G/DL (ref 6–8.5)
RBC # BLD AUTO: 5.04 10*6/MM3 (ref 3.77–5.28)
SODIUM SERPL-SCNC: 136 MMOL/L (ref 136–145)
TRIGL SERPL-MCNC: 197 MG/DL (ref 0–150)
TSH SERPL DL<=0.05 MIU/L-ACNC: 1.61 UIU/ML (ref 0.27–4.2)
VLDLC SERPL-MCNC: 34 MG/DL (ref 5–40)
WBC NRBC COR # BLD AUTO: 7.73 10*3/MM3 (ref 3.4–10.8)

## 2024-10-17 PROCEDURE — 82043 UR ALBUMIN QUANTITATIVE: CPT | Performed by: NURSE PRACTITIONER

## 2024-10-17 PROCEDURE — 83036 HEMOGLOBIN GLYCOSYLATED A1C: CPT | Performed by: NURSE PRACTITIONER

## 2024-10-17 PROCEDURE — 80061 LIPID PANEL: CPT | Performed by: NURSE PRACTITIONER

## 2024-10-17 PROCEDURE — 36415 COLL VENOUS BLD VENIPUNCTURE: CPT | Performed by: NURSE PRACTITIONER

## 2024-10-17 PROCEDURE — 80053 COMPREHEN METABOLIC PANEL: CPT | Performed by: NURSE PRACTITIONER

## 2024-10-17 PROCEDURE — 84443 ASSAY THYROID STIM HORMONE: CPT | Performed by: NURSE PRACTITIONER

## 2024-10-17 PROCEDURE — 85025 COMPLETE CBC W/AUTO DIFF WBC: CPT | Performed by: NURSE PRACTITIONER

## 2024-10-17 RX ORDER — CITALOPRAM HYDROBROMIDE 10 MG/1
10 TABLET ORAL DAILY
Qty: 90 TABLET | Refills: 1 | OUTPATIENT
Start: 2024-10-17

## 2024-10-24 ENCOUNTER — OFFICE VISIT (OUTPATIENT)
Dept: FAMILY MEDICINE CLINIC | Facility: CLINIC | Age: 69
End: 2024-10-24
Payer: MEDICARE

## 2024-10-24 VITALS
BODY MASS INDEX: 26.06 KG/M2 | HEART RATE: 79 BPM | OXYGEN SATURATION: 96 % | HEIGHT: 61 IN | DIASTOLIC BLOOD PRESSURE: 73 MMHG | TEMPERATURE: 97.3 F | WEIGHT: 138 LBS | SYSTOLIC BLOOD PRESSURE: 122 MMHG

## 2024-10-24 DIAGNOSIS — E11.9 TYPE 2 DIABETES MELLITUS WITHOUT COMPLICATION, WITHOUT LONG-TERM CURRENT USE OF INSULIN: Primary | ICD-10-CM

## 2024-10-24 DIAGNOSIS — K21.9 GASTROESOPHAGEAL REFLUX DISEASE WITHOUT ESOPHAGITIS: ICD-10-CM

## 2024-10-24 DIAGNOSIS — F41.1 GENERALIZED ANXIETY DISORDER: ICD-10-CM

## 2024-10-24 DIAGNOSIS — I49.3 PVC (PREMATURE VENTRICULAR CONTRACTION): ICD-10-CM

## 2024-10-24 DIAGNOSIS — J30.1 SEASONAL ALLERGIC RHINITIS DUE TO POLLEN: ICD-10-CM

## 2024-10-24 LAB
ALBUMIN SERPL-MCNC: 4.1 G/DL (ref 3.5–5.2)
ALBUMIN/GLOB SERPL: 1.3 G/DL
ALP SERPL-CCNC: 87 U/L (ref 39–117)
ALT SERPL W P-5'-P-CCNC: 28 U/L (ref 1–33)
ANION GAP SERPL CALCULATED.3IONS-SCNC: 10.7 MMOL/L (ref 5–15)
AST SERPL-CCNC: 27 U/L (ref 1–32)
BASOPHILS # BLD AUTO: 0.01 10*3/MM3 (ref 0–0.2)
BASOPHILS NFR BLD AUTO: 0.2 % (ref 0–1.5)
BILIRUB SERPL-MCNC: 0.4 MG/DL (ref 0–1.2)
BUN SERPL-MCNC: 11 MG/DL (ref 8–23)
BUN/CREAT SERPL: 17.2 (ref 7–25)
CALCIUM SPEC-SCNC: 9.6 MG/DL (ref 8.6–10.5)
CHLORIDE SERPL-SCNC: 103 MMOL/L (ref 98–107)
CHOLEST SERPL-MCNC: 177 MG/DL (ref 0–200)
CO2 SERPL-SCNC: 24.3 MMOL/L (ref 22–29)
CREAT SERPL-MCNC: 0.64 MG/DL (ref 0.57–1)
DEPRECATED RDW RBC AUTO: 39.2 FL (ref 37–54)
EGFRCR SERPLBLD CKD-EPI 2021: 94.6 ML/MIN/1.73
EOSINOPHIL # BLD AUTO: 0.15 10*3/MM3 (ref 0–0.4)
EOSINOPHIL NFR BLD AUTO: 2.6 % (ref 0.3–6.2)
ERYTHROCYTE [DISTWIDTH] IN BLOOD BY AUTOMATED COUNT: 12.1 % (ref 12.3–15.4)
GLOBULIN UR ELPH-MCNC: 3.1 GM/DL
GLUCOSE SERPL-MCNC: 156 MG/DL (ref 65–99)
HBA1C MFR BLD: 7 % (ref 4.8–5.6)
HCT VFR BLD AUTO: 43.4 % (ref 34–46.6)
HDLC SERPL QL: 4.43
HDLC SERPL-MCNC: 40 MG/DL (ref 40–60)
HGB BLD-MCNC: 14.6 G/DL (ref 12–15.9)
IMM GRANULOCYTES # BLD AUTO: 0.01 10*3/MM3 (ref 0–0.05)
IMM GRANULOCYTES NFR BLD AUTO: 0.2 % (ref 0–0.5)
LDLC SERPL CALC-MCNC: 105 MG/DL (ref 0–100)
LYMPHOCYTES # BLD AUTO: 1.56 10*3/MM3 (ref 0.7–3.1)
LYMPHOCYTES NFR BLD AUTO: 27.2 % (ref 19.6–45.3)
MCH RBC QN AUTO: 29.9 PG (ref 26.6–33)
MCHC RBC AUTO-ENTMCNC: 33.6 G/DL (ref 31.5–35.7)
MCV RBC AUTO: 88.9 FL (ref 79–97)
MONOCYTES # BLD AUTO: 0.45 10*3/MM3 (ref 0.1–0.9)
MONOCYTES NFR BLD AUTO: 7.8 % (ref 5–12)
NEUTROPHILS NFR BLD AUTO: 3.56 10*3/MM3 (ref 1.7–7)
NEUTROPHILS NFR BLD AUTO: 62 % (ref 42.7–76)
NRBC BLD AUTO-RTO: 0 /100 WBC (ref 0–0.2)
PLATELET # BLD AUTO: 206 10*3/MM3 (ref 140–450)
PMV BLD AUTO: 11.2 FL (ref 6–12)
POTASSIUM SERPL-SCNC: 4.9 MMOL/L (ref 3.5–5.2)
PROT SERPL-MCNC: 7.2 G/DL (ref 6–8.5)
RBC # BLD AUTO: 4.88 10*6/MM3 (ref 3.77–5.28)
SODIUM SERPL-SCNC: 138 MMOL/L (ref 136–145)
TRIGL SERPL-MCNC: 184 MG/DL (ref 0–150)
TSH SERPL DL<=0.05 MIU/L-ACNC: 0.78 UIU/ML (ref 0.27–4.2)
VLDLC SERPL-MCNC: 32 MG/DL (ref 5–40)
WBC NRBC COR # BLD AUTO: 5.74 10*3/MM3 (ref 3.4–10.8)

## 2024-10-24 PROCEDURE — 99214 OFFICE O/P EST MOD 30 MIN: CPT | Performed by: NURSE PRACTITIONER

## 2024-10-24 PROCEDURE — 1160F RVW MEDS BY RX/DR IN RCRD: CPT | Performed by: NURSE PRACTITIONER

## 2024-10-24 PROCEDURE — G2211 COMPLEX E/M VISIT ADD ON: HCPCS | Performed by: NURSE PRACTITIONER

## 2024-10-24 PROCEDURE — 80061 LIPID PANEL: CPT | Performed by: NURSE PRACTITIONER

## 2024-10-24 PROCEDURE — 1126F AMNT PAIN NOTED NONE PRSNT: CPT | Performed by: NURSE PRACTITIONER

## 2024-10-24 PROCEDURE — 83036 HEMOGLOBIN GLYCOSYLATED A1C: CPT | Performed by: NURSE PRACTITIONER

## 2024-10-24 PROCEDURE — 85025 COMPLETE CBC W/AUTO DIFF WBC: CPT | Performed by: NURSE PRACTITIONER

## 2024-10-24 PROCEDURE — 80053 COMPREHEN METABOLIC PANEL: CPT | Performed by: NURSE PRACTITIONER

## 2024-10-24 PROCEDURE — 3051F HG A1C>EQUAL 7.0%<8.0%: CPT | Performed by: NURSE PRACTITIONER

## 2024-10-24 PROCEDURE — 1159F MED LIST DOCD IN RCRD: CPT | Performed by: NURSE PRACTITIONER

## 2024-10-24 PROCEDURE — 84443 ASSAY THYROID STIM HORMONE: CPT | Performed by: NURSE PRACTITIONER

## 2024-10-24 RX ORDER — PANTOPRAZOLE SODIUM 40 MG/1
40 TABLET, DELAYED RELEASE ORAL DAILY
Qty: 90 TABLET | Refills: 1 | Status: SHIPPED | OUTPATIENT
Start: 2024-10-24

## 2024-10-24 RX ORDER — CITALOPRAM HYDROBROMIDE 10 MG/1
10 TABLET ORAL DAILY
Qty: 90 TABLET | Refills: 1 | Status: SHIPPED | OUTPATIENT
Start: 2024-10-24

## 2024-10-24 NOTE — PROGRESS NOTES
Chief Complaint  Anxiety, Diabetes, and Cough (Ongoing issue )    Subjective          Loretta Ren presents to [facility]  History of Present Illness  KARSTEN:  She is currently on celexa and is doing good.  GERD:  She is doing good with the pepcid and protonix.    Allergies:  She is doing her allergy shots and her reg meds too.  She has had sore throat.  She said the allergist told her it was acid reflux.  She has been taking her medicine and she is still getting more congestion and sore throat.  She has not had a fever.  PVC:  She is under cardiology care for Coreg.  She is going for a stress test and u/s on 5/29.  Holter comes off tomorrow.   DM:  She is still watching diet.    LIPID:  No meds overall ok with diet.  She is going to ask cardio about chaanging med due to cough                 Allergies  Aspirin and Ceftriaxone    Social History     Tobacco Use    Smoking status: Never    Smokeless tobacco: Never   Vaping Use    Vaping status: Never Used   Substance Use Topics    Alcohol use: Never    Drug use: Never       Family History   Problem Relation Age of Onset    Kidney cancer Father 78        bladder    Breast cancer Mother 80    Heart disease Mother     Heart disease Daughter     Malig Hyperthermia Neg Hx     Ovarian cancer Neg Hx     Uterine cancer Neg Hx     Prostate cancer Neg Hx     Colon cancer Neg Hx         Health Maintenance Due   Topic Date Due    ZOSTER VACCINE (3 of 3) 02/05/2015    DIABETIC FOOT EXAM  Never done    DIABETIC EYE EXAM  03/31/2023        Immunization History   Administered Date(s) Administered    Flu Vaccine Quad PF >36MO 10/06/2015, 12/01/2016, 10/03/2017, 10/10/2019, 10/06/2020    Fluzone High-Dose 65+YRS 10/11/2024    Fluzone High-Dose 65+yrs 09/30/2021, 10/17/2022, 10/02/2023    Fluzone Quad >6mos (Multi-dose) 10/19/2018    Influenza TIV (IM) 10/31/2014    Influenza, Unspecified 10/06/2020    PEDS-Pneumococcal Conjugate (PCV7) 10/31/2014     "Pneumococcal Conjugate Unspecified 10/31/2014    Pneumococcal Polysaccharide (PPSV23) 10/31/2014    Shingrix 12/11/2014    Zostavax 12/11/2014       Review of Systems   Constitutional:  Negative for fatigue.   Respiratory:  Positive for cough. Negative for shortness of breath.    Cardiovascular:  Negative for chest pain.   Gastrointestinal:  Negative for diarrhea, nausea and vomiting.        Objective       Vitals:    10/24/24 0847   BP: 122/73   BP Location: Left arm   Patient Position: Sitting   Cuff Size: Adult   Pulse: 79   Temp: 97.3 °F (36.3 °C)   SpO2: 96%   Weight: 62.6 kg (138 lb)   Height: 154.9 cm (61\")       Body mass index is 26.07 kg/m².         Physical Exam  Vitals reviewed.   Constitutional:       Appearance: Normal appearance. She is well-developed.   Cardiovascular:      Rate and Rhythm: Normal rate and regular rhythm.      Heart sounds: Normal heart sounds. No murmur heard.  Pulmonary:      Effort: Pulmonary effort is normal.      Breath sounds: Normal breath sounds.   Neurological:      Mental Status: She is alert and oriented to person, place, and time.      Cranial Nerves: No cranial nerve deficit.      Motor: No weakness.   Psychiatric:         Mood and Affect: Mood and affect normal.               Result Review :     The following data was reviewed by: KIAN Lyons on 10/24/2024:    Common Labs   Common labs          4/16/2024    08:27 10/17/2024    08:37 10/24/2024    09:24   Common Labs   Glucose 131  127  156    BUN 11  9  11    Creatinine 0.85  0.86  0.64    Sodium 136  136  138    Potassium 4.6  4.2  4.9    Chloride 101  101  103    Calcium 9.6  10.1  9.6    Albumin 4.5  4.6  4.1    Total Bilirubin 0.7  0.6  0.4    Alkaline Phosphatase 84  99  87    AST (SGOT) 27  21  27    ALT (SGPT) 27  29  28    WBC 5.83  7.73  5.74    Hemoglobin 15.1  16.1  14.6    Hematocrit 44.1  45.8  43.4    Platelets 205  223  206    Total Cholesterol 208  196  177    Triglycerides 153  197  184  "   HDL Cholesterol 46  46  40    LDL Cholesterol  135  116  105    Hemoglobin A1C 7.50  7.10  7.00    Microalbumin, Urine <1.2  1.6             No Images in the past 120 days found..              Assessment and Plan      Assessment & Plan  Gastroesophageal reflux disease without esophagitis    Generalized anxiety disorder    Type 2 diabetes mellitus without complication, without long-term current use of insulin    Seasonal allergic rhinitis due to pollen    PVC (premature ventricular contraction)      Orders Placed This Encounter   Procedures    CBC Auto Differential    Comprehensive Metabolic Panel    Hemoglobin A1c    Lipid Panel With / Chol / HDL Ratio    TSH     New Medications Ordered This Visit   Medications    pantoprazole (Protonix) 40 MG EC tablet     Sig: Take 1 tablet by mouth Daily.     Dispense:  90 tablet     Refill:  1    citalopram (CeleXA) 10 MG tablet     Sig: Take 1 tablet by mouth Daily.     Dispense:  90 tablet     Refill:  1          Diagnosis Plan   1. Type 2 diabetes mellitus without complication, without long-term current use of insulin  CBC Auto Differential    Comprehensive Metabolic Panel    Hemoglobin A1c    Lipid Panel With / Chol / HDL Ratio    TSH    CBC Auto Differential    Comprehensive Metabolic Panel    Hemoglobin A1c    Lipid Panel With / Chol / HDL Ratio    TSH      2. Gastroesophageal reflux disease without esophagitis  pantoprazole (Protonix) 40 MG EC tablet      3. Generalized anxiety disorder  citalopram (CeleXA) 10 MG tablet      4. Seasonal allergic rhinitis due to pollen        5. PVC (premature ventricular contraction)                  Follow Up     Return in about 6 months (around 4/24/2025) for Medicare Wellness labs prior.  Follow-up in 6 months for labs and appt. Call with any concerns or questions that you may have regarding your medications or history.    I have reviewed all medications and at this time no medications changes need to be adjusted for all chronic  conditions.    Patient was given instructions and counseling regarding her condition or for health maintenance advice. Please see specific information pulled into the AVS if appropriate.     Parts of this note are electronic transcriptions/translations of spoken language to printed text using the Dragon Dictation system.          Natalie Mukherjee, APRN  10/24/2024

## 2024-10-28 ENCOUNTER — OFFICE VISIT (OUTPATIENT)
Dept: CARDIOLOGY | Facility: CLINIC | Age: 69
End: 2024-10-28
Payer: MEDICARE

## 2024-10-28 VITALS
HEART RATE: 87 BPM | WEIGHT: 139 LBS | DIASTOLIC BLOOD PRESSURE: 73 MMHG | HEIGHT: 61 IN | BODY MASS INDEX: 26.24 KG/M2 | SYSTOLIC BLOOD PRESSURE: 124 MMHG

## 2024-10-28 DIAGNOSIS — I49.3 PVC (PREMATURE VENTRICULAR CONTRACTION): Primary | ICD-10-CM

## 2024-10-28 DIAGNOSIS — D86.9 SARCOIDOSIS: ICD-10-CM

## 2024-10-28 PROCEDURE — 99214 OFFICE O/P EST MOD 30 MIN: CPT | Performed by: INTERNAL MEDICINE

## 2024-10-28 PROCEDURE — G2211 COMPLEX E/M VISIT ADD ON: HCPCS | Performed by: INTERNAL MEDICINE

## 2024-10-28 RX ORDER — METOPROLOL SUCCINATE 25 MG/1
25 TABLET, EXTENDED RELEASE ORAL DAILY
Qty: 90 TABLET | Refills: 3 | Status: SHIPPED | OUTPATIENT
Start: 2024-10-28

## 2024-10-28 NOTE — PROGRESS NOTES
Chief Complaint  Follow-up, Hypertension, and PVC    Subjective    Patient doing well symptomatically heart palpitations remained stable she has had some soreness in her throat which is not improved with any treatment  Past Medical History:   Diagnosis Date    Allergic rhinitis     Anxiety 11/06/2017    IMPROVED SINCE STARTING TRINTELLIX    Duodenal ulcer     Facial pain 11/06/2017    THE PATIENT REPORTS A TRANSIENT BOUT OF FACIAL PAIN WITH RADIATION TOEARDS THE RIGHT EAR. SHE NOTES THAT THIS HAS RESOLVED. 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    GERD (gastroesophageal reflux disease) 11/06/2018    Hemorrhoids     Hyperlipidemia 11/06/2018    Osteopenia 04/29/2019    PONV (postoperative nausea and vomiting)     PVC (premature ventricular contraction) 08/04/2021    Renal calculus or stone     Sarcoidosis     Sinus trouble     Type 2 diabetes mellitus with hyperglycemia, without long-term current use of insulin 04/29/2019         Current Outpatient Medications:     citalopram (CeleXA) 10 MG tablet, Take 1 tablet by mouth Daily., Disp: 90 tablet, Rfl: 1    Cyanocobalamin (VITAMIN B 12 PO), Take  by mouth., Disp: , Rfl:     EPINEPHrine (EPIPEN) 0.3 MG/0.3ML solution auto-injector injection, USE AS DIRECTED FOR acute allergic reaction, Disp: , Rfl:     famotidine (PEPCID) 40 MG tablet, TAKE ONE TABLET BY MOUTH AT BEDTIME, Disp: 90 tablet, Rfl: 1    loratadine (CLARITIN) 10 MG tablet, Take 1 tablet by mouth Daily As Needed., Disp: , Rfl:     multivitamin with minerals tablet tablet, Take 1 tablet by mouth Daily., Disp: , Rfl:     pantoprazole (Protonix) 40 MG EC tablet, Take 1 tablet by mouth Daily., Disp: 90 tablet, Rfl: 1    metoprolol succinate XL (TOPROL-XL) 25 MG 24 hr tablet, Take 1 tablet by mouth Daily., Disp: 90 tablet, Rfl: 3    Medications Discontinued During This Encounter   Medication Reason    carvedilol (COREG) 3.125 MG tablet      Allergies  "  Allergen Reactions    Aspirin Swelling    Ceftriaxone Rash        Social History     Tobacco Use    Smoking status: Never    Smokeless tobacco: Never   Vaping Use    Vaping status: Never Used   Substance Use Topics    Alcohol use: Never    Drug use: Never       Family History   Problem Relation Age of Onset    Kidney cancer Father 78        bladder    Breast cancer Mother 80    Heart disease Mother     Heart disease Daughter     Malig Hyperthermia Neg Hx     Ovarian cancer Neg Hx     Uterine cancer Neg Hx     Prostate cancer Neg Hx     Colon cancer Neg Hx         Objective     /73   Pulse 87   Ht 154.9 cm (61\")   Wt 63 kg (139 lb)   BMI 26.26 kg/m²       Physical Exam    General Appearance:   no acute distress  Alert and oriented x3  HENT:   lips not cyanotic  Atraumatic regular rate and rhythm  Neck:  No jvd   supple  Respiratory:  no respiratory distress  normal breath sounds  no rales  Cardiovascular:  Regular rate and rhythm  no S3, no S4   no murmur  no rub  Extremities  No cyanosis  lower extremity edema: none    Skin:   warm, dry  No rashes      Result Review :     No results found for: \"PROBNP\"  CMP          4/16/2024    08:27 10/17/2024    08:37 10/24/2024    09:24   CMP   Glucose 131  127  156    BUN 11  9  11    Creatinine 0.85  0.86  0.64    EGFR 73.4  72.3  94.6    Sodium 136  136  138    Potassium 4.6  4.2  4.9    Chloride 101  101  103    Calcium 9.6  10.1  9.6    Total Protein 7.3  7.9  7.2    Albumin 4.5  4.6  4.1    Globulin 2.8  3.3  3.1    Total Bilirubin 0.7  0.6  0.4    Alkaline Phosphatase 84  99  87    AST (SGOT) 27  21  27    ALT (SGPT) 27  29  28    Albumin/Globulin Ratio 1.6  1.4  1.3    BUN/Creatinine Ratio 12.9  10.5  17.2    Anion Gap 10.6  8.3  10.7      CBC w/diff          4/16/2024    08:27 10/17/2024    08:37 10/24/2024    09:24   CBC w/Diff   WBC 5.83  7.73  5.74    RBC 4.94  5.04  4.88    Hemoglobin 15.1  16.1  14.6    Hematocrit 44.1  45.8  43.4    MCV 89.3  90.9  " "88.9    MCH 30.6  31.9  29.9    MCHC 34.2  35.2  33.6    RDW 12.8  12.4  12.1    Platelets 205  223  206    Neutrophil Rel % 59.3  73.2  62.0    Immature Granulocyte Rel % 0.3  0.4  0.2    Lymphocyte Rel % 30.5  17.2  27.2    Monocyte Rel % 7.9  7.4  7.8    Eosinophil Rel % 1.7  1.7  2.6    Basophil Rel % 0.3  0.1  0.2       Lab Results   Component Value Date    TSH 0.775 10/24/2024      Lab Results   Component Value Date    FREET4 1.31 09/27/2021      No results found for: \"DDIMERQUANT\"  Magnesium   Date Value Ref Range Status   01/18/2019 2.13 1.60 - 2.30 mg/dL Final      No results found for: \"DIGOXIN\"   No results found for: \"TROPONINT\"       Lab 10/24/24  0924   HEMOGLOBIN A1C 7.00*      Lipid Panel          4/16/2024    08:27 10/17/2024    08:37 10/24/2024    09:24   Lipid Panel   Total Cholesterol 208  196  177    Triglycerides 153  197  184    HDL Cholesterol 46  46  40    VLDL Cholesterol 27  34  32    LDL Cholesterol  135  116  105    LDL/HDL Ratio 2.86  2.40       No results found for: \"POCTROP\"    Results for orders placed in visit on 08/01/23    Adult Transthoracic Echo Complete W/ Cont if Necessary Per Protocol    Interpretation Summary    Left ventricular systolic function is normal. Calculated left ventricular EF = 55.9%    Left ventricular diastolic function was normal.    Estimated right ventricular systolic pressure from tricuspid regurgitation is normal (<35 mmHg).                 Diagnoses and all orders for this visit:    1. PVC (premature ventricular contraction) (Primary)  Assessment & Plan:  Patient was fullness in her throat she is concerned this may relate to medication felt that the Coreg was unlikely to be the cause but certainly could try changing over to Toprol 25 mg once a day.  Patient otherwise has no overt symptoms of palpitations to continue encourage exercise    Orders:  -     metoprolol succinate XL (TOPROL-XL) 25 MG 24 hr tablet; Take 1 tablet by mouth Daily.  Dispense: 90 " tablet; Refill: 3    2. Sarcoidosis  Assessment & Plan:  Patient with no evidence of cardiac involvement last echocardiogram shows normal EF and no new complaints              Follow Up     Return in about 1 year (around 10/28/2025) for Follow with Pinky Thompson, EKG with F/U.          Patient was given instructions and counseling regarding her condition or for health maintenance advice. Please see specific information pulled into the AVS if appropriate.

## 2024-10-28 NOTE — ASSESSMENT & PLAN NOTE
Patient was fullness in her throat she is concerned this may relate to medication felt that the Coreg was unlikely to be the cause but certainly could try changing over to Toprol 25 mg once a day.  Patient otherwise has no overt symptoms of palpitations to continue encourage exercise

## 2024-10-28 NOTE — ASSESSMENT & PLAN NOTE
Patient with no evidence of cardiac involvement last echocardiogram shows normal EF and no new complaints

## 2024-11-08 DIAGNOSIS — I10 ESSENTIAL HYPERTENSION: ICD-10-CM

## 2024-11-08 RX ORDER — CARVEDILOL 3.12 MG/1
3.12 TABLET ORAL 2 TIMES DAILY
Qty: 180 TABLET | Refills: 3 | OUTPATIENT
Start: 2024-11-08

## 2025-04-08 DIAGNOSIS — F41.1 GENERALIZED ANXIETY DISORDER: ICD-10-CM

## 2025-04-08 RX ORDER — CITALOPRAM HYDROBROMIDE 10 MG/1
10 TABLET ORAL DAILY
Qty: 90 TABLET | Refills: 1 | Status: SHIPPED | OUTPATIENT
Start: 2025-04-08

## 2025-04-10 ENCOUNTER — LAB (OUTPATIENT)
Dept: FAMILY MEDICINE CLINIC | Facility: CLINIC | Age: 70
End: 2025-04-10
Payer: MEDICARE

## 2025-04-10 DIAGNOSIS — E11.9 TYPE 2 DIABETES MELLITUS WITHOUT COMPLICATION, WITHOUT LONG-TERM CURRENT USE OF INSULIN: Primary | ICD-10-CM

## 2025-04-10 LAB
ALBUMIN SERPL-MCNC: 4.3 G/DL (ref 3.5–5.2)
ALBUMIN UR-MCNC: <1.2 MG/DL
ALBUMIN/GLOB SERPL: 1.3 G/DL
ALP SERPL-CCNC: 85 U/L (ref 39–117)
ALT SERPL W P-5'-P-CCNC: 32 U/L (ref 1–33)
ANION GAP SERPL CALCULATED.3IONS-SCNC: 9.3 MMOL/L (ref 5–15)
AST SERPL-CCNC: 26 U/L (ref 1–32)
BASOPHILS # BLD AUTO: 0.01 10*3/MM3 (ref 0–0.2)
BASOPHILS NFR BLD AUTO: 0.2 % (ref 0–1.5)
BILIRUB SERPL-MCNC: 0.6 MG/DL (ref 0–1.2)
BUN SERPL-MCNC: 15 MG/DL (ref 8–23)
BUN/CREAT SERPL: 20 (ref 7–25)
CALCIUM SPEC-SCNC: 9.8 MG/DL (ref 8.6–10.5)
CHLORIDE SERPL-SCNC: 101 MMOL/L (ref 98–107)
CHOLEST SERPL-MCNC: 191 MG/DL (ref 0–200)
CO2 SERPL-SCNC: 24.7 MMOL/L (ref 22–29)
CREAT SERPL-MCNC: 0.75 MG/DL (ref 0.57–1)
CREAT UR-MCNC: 75.4 MG/DL
DEPRECATED RDW RBC AUTO: 39.3 FL (ref 37–54)
EGFRCR SERPLBLD CKD-EPI 2021: 84.7 ML/MIN/1.73
EOSINOPHIL # BLD AUTO: 0.11 10*3/MM3 (ref 0–0.4)
EOSINOPHIL NFR BLD AUTO: 1.7 % (ref 0.3–6.2)
ERYTHROCYTE [DISTWIDTH] IN BLOOD BY AUTOMATED COUNT: 12.1 % (ref 12.3–15.4)
GLOBULIN UR ELPH-MCNC: 3.2 GM/DL
GLUCOSE SERPL-MCNC: 135 MG/DL (ref 65–99)
HBA1C MFR BLD: 7.3 % (ref 4.8–5.6)
HCT VFR BLD AUTO: 43.7 % (ref 34–46.6)
HDLC SERPL-MCNC: 48 MG/DL (ref 40–60)
HGB BLD-MCNC: 14.9 G/DL (ref 12–15.9)
IMM GRANULOCYTES # BLD AUTO: 0.01 10*3/MM3 (ref 0–0.05)
IMM GRANULOCYTES NFR BLD AUTO: 0.2 % (ref 0–0.5)
LDLC SERPL CALC-MCNC: 122 MG/DL (ref 0–100)
LDLC/HDLC SERPL: 2.5 {RATIO}
LYMPHOCYTES # BLD AUTO: 1.59 10*3/MM3 (ref 0.7–3.1)
LYMPHOCYTES NFR BLD AUTO: 24.7 % (ref 19.6–45.3)
MCH RBC QN AUTO: 30.7 PG (ref 26.6–33)
MCHC RBC AUTO-ENTMCNC: 34.1 G/DL (ref 31.5–35.7)
MCV RBC AUTO: 89.9 FL (ref 79–97)
MICROALBUMIN/CREAT UR: NORMAL MG/G{CREAT}
MONOCYTES # BLD AUTO: 0.5 10*3/MM3 (ref 0.1–0.9)
MONOCYTES NFR BLD AUTO: 7.8 % (ref 5–12)
NEUTROPHILS NFR BLD AUTO: 4.21 10*3/MM3 (ref 1.7–7)
NEUTROPHILS NFR BLD AUTO: 65.4 % (ref 42.7–76)
NRBC BLD AUTO-RTO: 0 /100 WBC (ref 0–0.2)
PLATELET # BLD AUTO: 226 10*3/MM3 (ref 140–450)
PMV BLD AUTO: 11.6 FL (ref 6–12)
POTASSIUM SERPL-SCNC: 4.5 MMOL/L (ref 3.5–5.2)
PROT SERPL-MCNC: 7.5 G/DL (ref 6–8.5)
RBC # BLD AUTO: 4.86 10*6/MM3 (ref 3.77–5.28)
SODIUM SERPL-SCNC: 135 MMOL/L (ref 136–145)
T4 FREE SERPL-MCNC: 1.21 NG/DL (ref 0.92–1.68)
TRIGL SERPL-MCNC: 115 MG/DL (ref 0–150)
TSH SERPL DL<=0.05 MIU/L-ACNC: 0.76 UIU/ML (ref 0.27–4.2)
VLDLC SERPL-MCNC: 21 MG/DL (ref 5–40)
WBC NRBC COR # BLD AUTO: 6.43 10*3/MM3 (ref 3.4–10.8)

## 2025-04-10 PROCEDURE — 84439 ASSAY OF FREE THYROXINE: CPT | Performed by: NURSE PRACTITIONER

## 2025-04-10 PROCEDURE — 80053 COMPREHEN METABOLIC PANEL: CPT | Performed by: NURSE PRACTITIONER

## 2025-04-10 PROCEDURE — 82043 UR ALBUMIN QUANTITATIVE: CPT | Performed by: NURSE PRACTITIONER

## 2025-04-10 PROCEDURE — 82570 ASSAY OF URINE CREATININE: CPT | Performed by: NURSE PRACTITIONER

## 2025-04-10 PROCEDURE — 85025 COMPLETE CBC W/AUTO DIFF WBC: CPT | Performed by: NURSE PRACTITIONER

## 2025-04-10 PROCEDURE — 83036 HEMOGLOBIN GLYCOSYLATED A1C: CPT | Performed by: NURSE PRACTITIONER

## 2025-04-10 PROCEDURE — 84443 ASSAY THYROID STIM HORMONE: CPT | Performed by: NURSE PRACTITIONER

## 2025-04-10 PROCEDURE — 80061 LIPID PANEL: CPT | Performed by: NURSE PRACTITIONER

## 2025-04-17 ENCOUNTER — OFFICE VISIT (OUTPATIENT)
Dept: FAMILY MEDICINE CLINIC | Facility: CLINIC | Age: 70
End: 2025-04-17
Payer: MEDICARE

## 2025-04-17 VITALS
HEART RATE: 79 BPM | DIASTOLIC BLOOD PRESSURE: 77 MMHG | SYSTOLIC BLOOD PRESSURE: 114 MMHG | BODY MASS INDEX: 26.15 KG/M2 | WEIGHT: 138.5 LBS | TEMPERATURE: 97.1 F | HEIGHT: 61 IN | OXYGEN SATURATION: 100 % | RESPIRATION RATE: 16 BRPM

## 2025-04-17 DIAGNOSIS — K21.9 GASTROESOPHAGEAL REFLUX DISEASE WITHOUT ESOPHAGITIS: ICD-10-CM

## 2025-04-17 DIAGNOSIS — E11.9 TYPE 2 DIABETES MELLITUS WITHOUT COMPLICATION, WITHOUT LONG-TERM CURRENT USE OF INSULIN: ICD-10-CM

## 2025-04-17 DIAGNOSIS — Z00.00 MEDICARE ANNUAL WELLNESS VISIT, SUBSEQUENT: Primary | ICD-10-CM

## 2025-04-17 DIAGNOSIS — E78.2 MIXED HYPERLIPIDEMIA: ICD-10-CM

## 2025-04-17 DIAGNOSIS — Z12.11 ENCOUNTER FOR SCREENING FOR MALIGNANT NEOPLASM OF COLON: ICD-10-CM

## 2025-04-17 RX ORDER — PANTOPRAZOLE SODIUM 40 MG/1
40 TABLET, DELAYED RELEASE ORAL DAILY
Qty: 90 TABLET | Refills: 1 | Status: SHIPPED | OUTPATIENT
Start: 2025-04-17

## 2025-04-17 RX ORDER — SUCRALFATE ORAL 1 G/10ML
1 SUSPENSION ORAL 3 TIMES DAILY
Qty: 420 ML | Refills: 0 | Status: SHIPPED | OUTPATIENT
Start: 2025-04-17 | End: 2025-05-01

## 2025-04-17 NOTE — PATIENT INSTRUCTIONS
Advance Directive    Advance directives are legal papers that state your wishes about health care decisions. They let your wishes be known to family, friends, and health care providers if you become unable to speak for yourself.   You should write these papers out over time rather than all at once. They can be changed and updated at any time.  The types of advance directives include:  Medical power of  (POA).  Living jordan.  Do not resuscitate (DNR) or do not attempt resuscitation (DNAR) orders.  What are a health care proxy and medical POA?  A health care proxy is also called a health care agent. It's a person you choose to make medical decisions for you when you can't make them for yourself. In most cases, a proxy is a trusted friend or family member.  A medical POA is legal paperwork that names your proxy. It may need to be:  Signed.  Notarized.  Dated.  Copied.  Witnessed.  Added to your medical record.  You may also want to choose someone to handle your money if you can't do so. This is called a durable POA for finances. It's separate from a medical POA. You may choose your health care proxy or someone else to act as your agent in money matters.  If you don't have a proxy, or if the proxy may not be acting in your best interest, a court may choose a guardian to act on your behalf.  What is a living will?  A living will is legal paperwork that states your wishes about medical care. Providers should keep a copy of it in your medical record. You may want to give a copy to family members or friends. You can also keep a card in your wallet to let loved ones know you have a living will and where they can find it. A living will may be used if:  You're very sick with something that will end your life.  You become disabled.  You can't make decisions or speak for yourself.  Your living will should include whether:  To use or not use life support equipment. This may include machines to filter your blood or to help  you breathe.  You want a DNR or DNAR order. This tells providers not to use CPR if your heart or breathing stops.  To use or not use tube feeding.  You want to be given foods and fluids.  You want a type of comfort care called palliative care. This may be given when the goal for treatment becomes comfort rather than a cure.  You want to donate your organs and tissues.  A living will doesn't say what to do with your money and property if you pass away.  What is a DNR or DNAR?  A DNR or DNAR order is a request not to have CPR. If you don't have one of these orders, a provider will try to help you if your heart stops or you stop breathing.   If you plan to have surgery, talk with your provider about your DNR or DNAR order.  What happens if I don't have an advance directive?  Each state has its own laws about advance directives. Some states assign family decision makers to act on your behalf if you don't have an advance directive.   Check with your provider, , or state representative about the laws in your state.  Where to find more information  Each state has its own laws about advance directives. You can look up these laws at: [email or web address]  This information is not intended to replace advice given to you by your health care provider. Make sure you discuss any questions you have with your health care provider.  Document Revised: 05/06/2024 Document Reviewed: 05/06/2024  Elsevier Patient Education © 2024 [employer]

## 2025-04-17 NOTE — PROGRESS NOTES
Subjective   The ABCs of the Annual Wellness Visit  Medicare Wellness Visit      Loretta Ren is a 70 y.o. patient who presents for a Medicare Wellness Visit.    The following portions of the patient's history were reviewed and   updated as appropriate: allergies, current medications, past family history, past medical history, past social history, past surgical history, and problem list.    Compared to one year ago, the patient's physical   health is the same.  Compared to one year ago, the patient's mental   health is the same.    Recent Hospitalizations:  She was not admitted to the hospital during the last year.     Current Medical Providers:  Patient Care Team:  Natalie Mukherjee APRN as PCP - General (Nurse Practitioner)  Deacon Rosales MD as Consulting Physician (Urology)  Kendra Traore APRN as Nurse Practitioner (Nurse Practitioner)  Ava Rosen MD as Consulting Physician (Gastroenterology)  Magaly Tan APRN as Nurse Practitioner (Nurse Practitioner)    Outpatient Medications Prior to Visit   Medication Sig Dispense Refill    Artificial Tear Ointment (artificial tears) ophthalmic ointment Administer  to both eyes Every 1 (One) Hour As Needed.      citalopram (CeleXA) 10 MG tablet TAKE ONE TABLET BY MOUTH DAILY 90 tablet 1    Cyanocobalamin (VITAMIN B 12 PO) Take  by mouth.      EPINEPHrine (EPIPEN) 0.3 MG/0.3ML solution auto-injector injection USE AS DIRECTED FOR acute allergic reaction      famotidine (PEPCID) 40 MG tablet TAKE ONE TABLET BY MOUTH AT BEDTIME 90 tablet 1    loratadine (CLARITIN) 10 MG tablet Take 1 tablet by mouth Daily As Needed.      metoprolol succinate XL (TOPROL-XL) 25 MG 24 hr tablet Take 1 tablet by mouth Daily. 90 tablet 3    multivitamin with minerals tablet tablet Take 1 tablet by mouth Daily.      pantoprazole (Protonix) 40 MG EC tablet Take 1 tablet by mouth Daily. 90 tablet 1     No facility-administered medications prior to visit.     No opioid  "medication identified on active medication list. I have reviewed chart for other potential  high risk medication/s and harmful drug interactions in the elderly.      Aspirin is not on active medication list.  Aspirin use is not indicated based on review of current medical condition/s. Risk of harm outweighs potential benefits.  .    Patient Active Problem List   Diagnosis    PVC (premature ventricular contraction)    Allergic rhinitis    Fibrosis lung    Generalized anxiety disorder    GERD (gastroesophageal reflux disease)    Osteopenia    Type 2 diabetes mellitus    Sarcoidosis    Nephrolithiasis    Ureteral stone     Advance Care Planning Advance Directive is not on file.  ACP discussion was declined by the patient. Patient does not have an advance directive, information provided.            Objective   Vitals:    04/17/25 0826   BP: 114/77   Pulse: 79   Resp: 16   Temp: 97.1 °F (36.2 °C)   SpO2: 100%   Weight: 62.8 kg (138 lb 8 oz)   Height: 154.9 cm (61\")   PainSc: 0-No pain   PainLoc: Abdomen       Estimated body mass index is 26.17 kg/m² as calculated from the following:    Height as of this encounter: 154.9 cm (61\").    Weight as of this encounter: 62.8 kg (138 lb 8 oz).                Does the patient have evidence of cognitive impairment? No  Lab Results   Component Value Date    TRIG 115 04/10/2025    HDL 48 04/10/2025     (H) 04/10/2025    VLDL 21 04/10/2025    HGBA1C 7.30 (H) 04/10/2025                                                                                                Health  Risk Assessment    Smoking Status:  Social History     Tobacco Use   Smoking Status Never   Smokeless Tobacco Never     Alcohol Consumption:  Social History     Substance and Sexual Activity   Alcohol Use Never       Fall Risk Screen  STEADI Fall Risk Assessment was completed, and patient is at LOW risk for falls.Assessment completed on:4/17/2025    Depression Screening   Little interest or pleasure in doing " things? Not at all   Feeling down, depressed, or hopeless? Not at all   PHQ-2 Total Score 0      Health Habits and Functional and Cognitive Screenin/17/2025     8:27 AM   Functional & Cognitive Status   Do you have difficulty preparing food and eating? No   Do you have difficulty bathing yourself, getting dressed or grooming yourself? No   Do you have difficulty using the toilet? No   Do you have difficulty moving around from place to place? No   Do you have trouble with steps or getting out of a bed or a chair? No   Current Diet Well Balanced Diet   Dental Exam Not up to date        Dental Exam Comment due in    Eye Exam Up to date        Eye Exam Comment Thania 2024   Exercise (times per week) 7 times per week   Current Exercises Include Walking   Do you need help using the phone?  No   Are you deaf or do you have serious difficulty hearing?  No   Do you need help to go to places out of walking distance? No   Do you need help shopping? No   Do you need help preparing meals?  No   Do you need help with housework?  No   Do you need help with laundry? No   Do you need help taking your medications? No   Do you need help managing money? No   Do you ever drive or ride in a car without wearing a seat belt? No   Have you felt unusual stress, anger or loneliness in the last month? No   Who do you live with? Spouse   If you need help, do you have trouble finding someone available to you? No   Have you been bothered in the last four weeks by sexual problems? No   Do you have difficulty concentrating, remembering or making decisions? Yes           Age-appropriate Screening Schedule:  Refer to the list below for future screening recommendations based on patient's age, sex and/or medical conditions. Orders for these recommended tests are listed in the plan section. The patient has been provided with a written plan.    Health Maintenance List  Health Maintenance   Topic Date Due    DIABETIC FOOT EXAM   Never done    COLORECTAL CANCER SCREENING  05/19/2025    COVID-19 Vaccine (1 - 2024-25 season) 05/01/2025 (Originally 9/1/2024)    TDAP/TD VACCINES (1 - Tdap) 05/01/2025 (Originally 4/10/1974)    ZOSTER VACCINE (3 of 3) 05/01/2025 (Originally 2/5/2015)    Pneumococcal Vaccine 50+ (2 of 2 - PCV) 09/04/2025 (Originally 10/31/2015)    INFLUENZA VACCINE  07/01/2025    HEMOGLOBIN A1C  10/10/2025    DIABETIC EYE EXAM  12/05/2025    LIPID PANEL  04/10/2026    URINE MICROALBUMIN-CREATININE RATIO (uACR)  04/10/2026    ANNUAL WELLNESS VISIT  04/17/2026    MAMMOGRAM  05/29/2026    DXA SCAN  05/29/2026    HEPATITIS C SCREENING  Completed                                                                                                                                                CMS Preventative Services Quick Reference  Risk Factors Identified During Encounter  Dental Screening Recommended  Vision Screening Recommended    The above risks/problems have been discussed with the patient.  Pertinent information has been shared with the patient in the After Visit Summary.  An After Visit Summary and PPPS were made available to the patient.    Follow Up:   Next Medicare Wellness visit to be scheduled in 1 year.          Additional E&M Note during same encounter follows:  Patient has multiple medical problems which are significant and separately identifiable that require additional work above and beyond the Medicare Wellness Visit.      Chief Complaint  Medicare Wellness-subsequent and Abdominal Pain (States upper gastric pain- Protonix not helping)    Loretta Ren is a 70 y.o. female who presents to [facility]     History of Present Illness  The patient presents for evaluation of gastritis, cataracts, and cholesterol management.    Overall health status remains unchanged from the previous year, except for ocular issues. No hospital admissions have been required over the past year. Cataract surgery  "was performed, and a follow-up visit is scheduled for tomorrow. Difficulty in reading small print necessitates the use of glasses, and occasional double vision is reported after repeated visual focus. It was noted that the medication administered to the eye was metabolized too rapidly, but the subsequent treatment plan remains unclear.    An advanced directive or living will is not in place. Occasional lapses in memory are experienced, particularly when engaged in tasks, but events are recalled upon reflection.    The Cologuard test is due on 05/19/2025, and the test kit is anticipated soon.    Allergy injections are received biweekly, with occasional rhinorrhea reported.    Current medications include metoprolol and B12 supplements. No chest pain is reported.    Cholesterol-lowering medications are not tolerated, and fish oil supplements have been discontinued. The diet includes fish, chicken, and occasional hot dogs.    Gastrointestinal discomfort has not been alleviated by Protonix. An upper endoscopy performed in 03/2024 by Dr. Rosen revealed gastritis and small gastric polyps. Medical attention was advised if symptoms occurred. A preference for liquid medication over tablets is expressed. The current regimen includes daily Protonix 40 mg and Pepcid 40 mg.    Objective   Vital Signs:   Vitals:    04/17/25 0826   BP: 114/77   Pulse: 79   Resp: 16   Temp: 97.1 °F (36.2 °C)   SpO2: 100%   Weight: 62.8 kg (138 lb 8 oz)   Height: 154.9 cm (61\")   PainSc: 0-No pain   PainLoc: Abdomen       Wt Readings from Last 3 Encounters:   04/17/25 62.8 kg (138 lb 8 oz)   10/28/24 63 kg (139 lb)   10/24/24 62.6 kg (138 lb)     BP Readings from Last 3 Encounters:   04/17/25 114/77   10/28/24 124/73   10/24/24 122/73       Physical Exam  Vitals reviewed.   Constitutional:       Appearance: Normal appearance. She is well-developed.   HENT:      Head: Normocephalic.      Right Ear: Tympanic membrane and ear canal normal.      Left " Ear: Tympanic membrane and ear canal normal.      Nose: Rhinorrhea present. No congestion.      Mouth/Throat:      Pharynx: No oropharyngeal exudate or posterior oropharyngeal erythema.   Neck:      Vascular: No carotid bruit.   Cardiovascular:      Rate and Rhythm: Normal rate and regular rhythm.      Heart sounds: Normal heart sounds. No murmur heard.  Pulmonary:      Effort: Pulmonary effort is normal.      Breath sounds: Normal breath sounds.   Skin:     Findings: No bruising.   Neurological:      General: No focal deficit present.      Mental Status: She is alert and oriented to person, place, and time.      Cranial Nerves: No cranial nerve deficit.      Motor: No weakness.   Psychiatric:         Mood and Affect: Mood and affect normal.         Behavior: Behavior normal.         Thought Content: Thought content normal.         Judgment: Judgment normal.         Physical Exam      The following data was reviewed by KIAN Lyons on 04/17/2025      Results  Labs   - Urine test: No creatinine or microalbumin   - White count: 6.4   - Hemoglobin: 14.9   - Platelets: 226   - Fasting sugar: 135   - Sodium: 135   - Potassium: Normal   - Liver function test: Normal   - Total cholesterol: 191   - LDL: 122    Diagnostic Testing   - Upper scope: 03/2024, Gastritis and small polyps in the stomach        Assessment & Plan   Diagnoses and all orders for this visit:    1. Medicare annual wellness visit, subsequent (Primary)    2. Encounter for screening for malignant neoplasm of colon  -     Cologuard - Stool, Per Rectum; Future    3. Gastroesophageal reflux disease without esophagitis  -     pantoprazole (Protonix) 40 MG EC tablet; Take 1 tablet by mouth Daily.  Dispense: 90 tablet; Refill: 1  -     sucralfate (Carafate) 1 GM/10ML suspension; Take 10 mL by mouth 3 times a day for 14 days.  Dispense: 420 mL; Refill: 0    4. Type 2 diabetes mellitus without complication, without long-term current use of insulin  -      CBC Auto Differential; Future  -     Comprehensive Metabolic Panel; Future  -     Hemoglobin A1c; Future  -     Lipid Panel With / Chol / HDL Ratio; Future  -     TSH; Future    5. Mixed hyperlipidemia  -     CBC Auto Differential; Future  -     Comprehensive Metabolic Panel; Future  -     Lipid Panel With / Chol / HDL Ratio; Future        Assessment & Plan  1. Gastritis.  - Persistent stomach issues despite taking Protonix 40 mg daily and Pepcid 40 mg daily.  - Upper endoscopy in 03/2024 revealed gastritis and small polyps.  - Carafate liquid prescribed at 2 teaspoons three times a day for two weeks. Advised not to consume anything one hour before or two hours after taking Carafate.  - Continue current regimen of Protonix and Pepcid, ensuring they are taken at different times. Further evaluation if symptoms persist.    2. Cataracts.  - Reports blurred vision and difficulty reading close up following cataract surgery. Occasional double vision noted.  - Physical exam findings not discussed.  - Follow-up with eye doctor tomorrow to discuss potential further treatment options, such as laser therapy.  - No immediate treatment changes; awaiting further evaluation.    3. Cholesterol management.  - Cholesterol levels within normal range: total cholesterol 191, .  - Not currently on any cholesterol medication due to intolerance.  - Advised to incorporate fish into diet, ensuring a daily intake of approximately 4 g of fish oil or omega-3.  - No new medications prescribed; dietary adjustments recommended.    4. Health maintenance.  - Blood work results satisfactory: no proteinuria, WBC 6.4, hemoglobin 14.9, platelets 226, fasting glucose improved from 156 to 135, liver function tests normal.  - A1c level increased slightly from 7.0 to 7.3.  - Due for Cologuard test on 05/19/2025.  - Information regarding advanced directives and living jordan to be provided on [employer].  - Blood work to be ordered in six months.    5.  Allergies.  - Continues to receive allergy shots every two weeks, significantly improving symptoms including runny nose and sinus issues.  - Physical exam findings not discussed.  - No new assessments required.  - Continue current allergy shot regimen.    6. Medication management.  - Currently taking metoprolol and B12 tablets as prescribed by Dr. Roa.  - Physical exam findings not discussed.  - No new assessments required.  - Continue current medication regimen.    7. Memory issues.  - Reports occasional difficulty concentrating and remembering tasks but recalls them after a brief pause.  - Considered normal aging-related memory changes.  - No further testing deemed necessary at this time.  - No treatment changes; monitoring for any significant changes.               FOLLOW UP  Return in about 6 months (around 10/17/2025).  Patient was given instructions and counseling regarding her condition or for health maintenance advice. Please see specific information pulled into the AVS if appropriate.     Patient or patient representative verbalized consent for the use of Ambient Listening during the visit with  KIAN Lyons for chart documentation. 4/17/2025  09:03 EDT    KIAN Lyons  04/17/25  09:40 EDT

## 2025-04-30 ENCOUNTER — PATIENT ROUNDING (BHMG ONLY) (OUTPATIENT)
Dept: FAMILY MEDICINE CLINIC | Facility: CLINIC | Age: 70
End: 2025-04-30
Payer: MEDICARE

## 2025-04-30 NOTE — PROGRESS NOTES
A My-Chart message has been sent to the patient for PATIENT ROUNDING with [facility] [place] [facility].

## 2025-05-01 ENCOUNTER — TELEPHONE (OUTPATIENT)
Dept: FAMILY MEDICINE CLINIC | Facility: CLINIC | Age: 70
End: 2025-05-01
Payer: MEDICARE

## 2025-05-01 ENCOUNTER — TRANSCRIBE ORDERS (OUTPATIENT)
Dept: ADMINISTRATIVE | Facility: HOSPITAL | Age: 70
End: 2025-05-01
Payer: MEDICARE

## 2025-05-01 DIAGNOSIS — K21.9 GASTROESOPHAGEAL REFLUX DISEASE WITHOUT ESOPHAGITIS: ICD-10-CM

## 2025-05-01 DIAGNOSIS — Z12.31 SCREENING MAMMOGRAM FOR BREAST CANCER: Primary | ICD-10-CM

## 2025-05-01 NOTE — TELEPHONE ENCOUNTER
Caller: Loretta Ren    Relationship: Self    Best call back number: [phone]    What is the best time to reach you: ANYTIME    Who are you requesting to speak with (clinical staff, provider,  specific staff member): NURSE     What was the call regarding: PATIENT IS CALLING TO LET PCP KNOW THAT THE MEDICATION SHE GAVE HER FOR HER STOMACH IS WORKING FINE

## 2025-05-02 RX ORDER — SUCRALFATE ORAL 1 G/10ML
1 SUSPENSION ORAL 2 TIMES DAILY
Qty: 1800 ML | Refills: 1 | Status: SHIPPED | OUTPATIENT
Start: 2025-05-02

## 2025-05-05 ENCOUNTER — TELEPHONE (OUTPATIENT)
Dept: FAMILY MEDICINE CLINIC | Facility: CLINIC | Age: 70
End: 2025-05-05
Payer: MEDICARE

## 2025-05-30 ENCOUNTER — HOSPITAL ENCOUNTER (OUTPATIENT)
Dept: MAMMOGRAPHY | Facility: HOSPITAL | Age: 70
Discharge: HOME | End: 2025-05-30
Admitting: NURSE PRACTITIONER
Payer: MEDICARE

## 2025-05-30 DIAGNOSIS — Z12.31 SCREENING MAMMOGRAM FOR BREAST CANCER: ICD-10-CM

## 2025-05-30 PROCEDURE — 77063 BREAST TOMOSYNTHESIS BI: CPT

## 2025-05-30 PROCEDURE — 77067 SCR MAMMO BI INCL CAD: CPT

## 2025-06-01 ENCOUNTER — RESULTS FOLLOW-UP (OUTPATIENT)
Dept: ADMINISTRATIVE | Facility: HOSPITAL | Age: 70
End: 2025-06-01
Payer: MEDICARE

## (undated) DEVICE — CYSTO PACK: Brand: MEDLINE INDUSTRIES, INC.

## (undated) DEVICE — Y-TYPE TUR/BLADDER IRRIGATION SET, REGULATING CLAMP

## (undated) DEVICE — SOLIDIFIER LIQLOC PLS 1500CC BT

## (undated) DEVICE — SINGLE-USE BIOPSY FORCEPS: Brand: RADIAL JAW 4

## (undated) DEVICE — TOWEL,OR,DSP,ST,BLUE,STD,4/PK,20PK/CS: Brand: MEDLINE

## (undated) DEVICE — GLV SURG SENSICARE SLT PF LF 8 STRL

## (undated) DEVICE — GW SUREGLIDE FLXTIP ANG/TP .038 3X150CM EA/5

## (undated) DEVICE — TRY PREP SCRB VAG PVP

## (undated) DEVICE — NITINOL STONE RETRIEVAL BASKET: Brand: ESCAPE

## (undated) DEVICE — CYSTO/BLADDER IRRIGATION SET, REGULATING CLAMP

## (undated) DEVICE — Device

## (undated) DEVICE — GW PTFE/COAT STR/TP STFF/BDY .038 150CM STRL EA/5

## (undated) DEVICE — SOL IRRG H2O PL/BG 1000ML STRL

## (undated) DEVICE — SOL IRR NACL 0.9PCT 3000ML

## (undated) DEVICE — SOL IRRG H2O BG 3000ML STRL

## (undated) DEVICE — Device: Brand: DEFENDO AIR/WATER/SUCTION AND BIOPSY VALVE

## (undated) DEVICE — LINER SURG CANSTR SXN S/RIGD 1500CC

## (undated) DEVICE — SYS IRR PUMP SGL ACTN VAC SYR 10CC

## (undated) DEVICE — BLCK/BITE BLOX WO/DENTL/RIM W/STRAP 54F

## (undated) DEVICE — ENDOSCOPIC VALVE WITH ADAPTER.: Brand: SURSEAL® II

## (undated) DEVICE — GOWN,REINFORCE,POLY,SIRUS,BREATH SLV,XLG: Brand: MEDLINE

## (undated) DEVICE — CONN JET HYDRA H20 AUXILIARY DISP

## (undated) DEVICE — BASIC SINGLE BASIN-LF: Brand: MEDLINE INDUSTRIES, INC.

## (undated) DEVICE — DUAL LUMEN URETERAL CATHETER